# Patient Record
Sex: FEMALE | Race: BLACK OR AFRICAN AMERICAN | NOT HISPANIC OR LATINO | Employment: OTHER | URBAN - METROPOLITAN AREA
[De-identification: names, ages, dates, MRNs, and addresses within clinical notes are randomized per-mention and may not be internally consistent; named-entity substitution may affect disease eponyms.]

---

## 2017-01-03 ENCOUNTER — OFFICE VISIT (OUTPATIENT)
Dept: BEHAVIORAL HEALTH | Facility: PHYSICIAN GROUP | Age: 37
End: 2017-01-03
Payer: COMMERCIAL

## 2017-01-03 VITALS — RESPIRATION RATE: 14 BRPM | TEMPERATURE: 98.2 F | HEART RATE: 80 BPM

## 2017-01-03 DIAGNOSIS — F25.0 SCHIZOAFFECTIVE DISORDER, BIPOLAR TYPE (HCC): ICD-10-CM

## 2017-01-03 DIAGNOSIS — F51.01 PRIMARY INSOMNIA: ICD-10-CM

## 2017-01-03 PROCEDURE — 90833 PSYTX W PT W E/M 30 MIN: CPT | Performed by: PSYCHIATRY & NEUROLOGY

## 2017-01-03 PROCEDURE — 99213 OFFICE O/P EST LOW 20 MIN: CPT | Performed by: PSYCHIATRY & NEUROLOGY

## 2017-01-03 RX ORDER — LAMOTRIGINE 25 MG/1
TABLET ORAL
Qty: 60 TAB | Refills: 2 | Status: ON HOLD | OUTPATIENT
Start: 2017-01-03 | End: 2017-04-17

## 2017-01-03 RX ORDER — TRAZODONE HYDROCHLORIDE 50 MG/1
TABLET ORAL
Qty: 60 TAB | Refills: 5 | Status: ON HOLD | OUTPATIENT
Start: 2017-01-03 | End: 2017-04-17

## 2017-01-03 RX ORDER — ZIPRASIDONE HYDROCHLORIDE 80 MG/1
CAPSULE ORAL
Qty: 60 CAP | Refills: 5 | Status: SHIPPED | OUTPATIENT
Start: 2017-01-03 | End: 2018-03-27

## 2017-01-03 NOTE — PROGRESS NOTES
[facility]  PSYCHIATRIC FOLLOW-UP NOTE    Name: Cira Ferguson  MRN: [id number]  : 1980  Age: 36 y.o.  Date of assessment: 1/3/2017  PCP: Dee Sanchez M.D.  Persons in attendance: Patient    REASON FOR VISIT/CHIEF COMPLAINT (as stated by Patient):  Cira Ferguson is a 36 y.o.,  or  female, attending follow-up appointment for   Chief Complaint   Patient presents with   • Other     The patient is seen today for follow up on her psychosis and insomnia.   .      HISTORY OF PRESENT ILLNESS:    This is a 37 yo female, , disabled, lives with her , seen today for follow up. The patient has history of psychosis and haldol helped her. The patient has been on geodon for many years but reported insomnia.      PSYCHOSOCIAL CHANGES SINCE PREVIOUS CONTACT:  The patient reported severe mood swings and she has period of good days and bad days with irritability and depression.     RESPONSE TO TREATMENT:  Mood swings.    MEDICATION SIDE EFFECTS:  Denied.    REVIEW OF SYSTEMS:        Constitutional positive - chronic pain   Eyes negative   Ears/Nose/Mouth/Throat negative   Cardiovascular positive - DVT   Respiratory negative   Gastrointestinal negative   Genitourinary positive - PCOS   Muscular negative   Integumentary negative   Neurological negative   Endocrine negative   Hematologic/Lymphatic negative       PSYCHIATRIC EXAMINATION/MENTAL STATUS  Pulse 80  Temp(Src) 36.8 °C (98.2 °F)  Resp 14  LMP 2014  Participation: Active verbal participation, Attentive and Engaged  Grooming:Casual  Orientation: Alert and Fully Oriented  Eye contact: Good  Behavior:Calm   Mood: Anxious  Affect: Flexible and Full range  Thought process: Logical and Goal-directed  Thought content:  Within normal limits  Speech: Rate within normal limits and Volume within normal limits  Perception:  Within normal limits  Memory:  No gross evidence of memory deficits  Insight: Good  Judgment:  Good  Family/couple interaction observations:   Other:    Current risk:    Suicide: Low   Homicide: Low   Self-harm: Low  Relapse: Low  Other:   Crisis Safety Plan reviewed?Yes  If evidence of imminent risk is present, intervention/plan:    Medical Records/Labs/Diagnostic Tests Reviewed: yes.    Medical Records/Labs/Diagnostic Tests Ordered: none.    DIAGNOSTIC IMPRESSION(S):  1. Schizoaffective disorder, bipolar type (HCC)    2. Primary insomnia           ASSESSMENT AND PLAN:  Schizoaffective disorder  Insomnia  Mood swings.    Start Lamotrigine 25 mg for two weeks then two per day after discussing risk benefit and alternative.  Continue Geodon 80 mg two at night  Trazodone 50 mg two at night and prescription for geodn and trazodone send to pharmacy for six months and lamotrigine for two months  Follow up in six weeks.    More than 50% of face-to-face time in this 30 minute visit was spent in psychotherapy/counseling.    Topics addressed include:    We focused on her mood stability and coping skills. The patient is doing doet and exercise. She has a good sleep hygiene. The patient  is supportive.     Emery Michaels M.D.

## 2017-01-18 ENCOUNTER — TELEPHONE (OUTPATIENT)
Dept: BEHAVIORAL HEALTH | Facility: MEDICAL CENTER | Age: 37
End: 2017-01-18

## 2017-01-18 RX ORDER — ALPRAZOLAM 1 MG/1
1 TABLET ORAL NIGHTLY PRN
Qty: 15 TAB | Refills: 0 | Status: ON HOLD
Start: 2017-01-18 | End: 2017-04-17

## 2017-02-21 RX ORDER — ZIPRASIDONE HYDROCHLORIDE 80 MG/1
CAPSULE ORAL
Qty: 60 CAP | Refills: 0 | Status: ON HOLD | OUTPATIENT
Start: 2017-02-21 | End: 2017-04-17

## 2017-02-27 ENCOUNTER — OFFICE VISIT (OUTPATIENT)
Dept: BEHAVIORAL HEALTH | Facility: PHYSICIAN GROUP | Age: 37
End: 2017-02-27
Payer: COMMERCIAL

## 2017-02-27 VITALS
TEMPERATURE: 98.2 F | BODY MASS INDEX: 41.02 KG/M2 | RESPIRATION RATE: 16 BRPM | WEIGHT: 293 LBS | HEART RATE: 80 BPM | HEIGHT: 71 IN | DIASTOLIC BLOOD PRESSURE: 68 MMHG | SYSTOLIC BLOOD PRESSURE: 120 MMHG

## 2017-02-27 DIAGNOSIS — F25.0 SCHIZOAFFECTIVE DISORDER, BIPOLAR TYPE (HCC): ICD-10-CM

## 2017-02-27 PROCEDURE — 99213 OFFICE O/P EST LOW 20 MIN: CPT | Performed by: PSYCHIATRY & NEUROLOGY

## 2017-02-27 RX ORDER — OXCARBAZEPINE 300 MG/1
300 TABLET, FILM COATED ORAL
Qty: 30 TAB | Refills: 2 | Status: ON HOLD | OUTPATIENT
Start: 2017-02-27 | End: 2017-04-17

## 2017-02-27 NOTE — PROGRESS NOTES
"[facility]  PSYCHIATRIC FOLLOW-UP NOTE    Name: Cira Ferguson  MRN: [id number]  : 1980  Age: 36 y.o.  Date of assessment: 2017  PCP: Dee Sanchez M.D.  Persons in attendance: Patient and Spouse/Partner    REASON FOR VISIT/CHIEF COMPLAINT (as stated by Patient and Spouse/Partner):  Cira Ferguson is a 36 y.o.,  or  female, attending follow-up appointment for   Chief Complaint   Patient presents with   • Other     The patient is seen today for follow up on her bipolar disorder and she is not sleeping well.   .      HISTORY OF PRESENT ILLNESS:    This is a 37 yo female, , lives with her  seen today for follow up. The patient tried lamotrigine but she had rash from it. The patient reported that she has been irritated easely and she is taking it out on her . The patient has been sleeping well on geodon.      PSYCHOSOCIAL CHANGES SINCE PREVIOUS CONTACT:  Mood swings with irritation and impulsivity.    RESPONSE TO TREATMENT:  Fair.    MEDICATION SIDE EFFECTS:  Denied.    REVIEW OF SYSTEMS:        Constitutional positive - obese   Eyes negative   Ears/Nose/Mouth/Throat negative   Cardiovascular negative   Respiratory negative   Gastrointestinal negative   Genitourinary positive - PCOS   Muscular negative   Integumentary positive - chronic back pain   Neurological positive - none.   Endocrine negative   Hematologic/Lymphatic negative       PSYCHIATRIC EXAMINATION/MENTAL STATUS  /68 mmHg  Pulse 80  Temp(Src) 36.8 °C (98.2 °F)  Resp 16  Ht 1.803 m (5' 10.98\")  Wt 166.47 kg (367 lb)  BMI 51.21 kg/m2  LMP 2014  Participation: Active verbal participation, Attentive and Engaged  Grooming:Casual  Orientation: Alert and Fully Oriented  Eye contact: Good  Behavior:Calm   Mood: Anxious  Affect: Full range  Thought process: Logical and Goal-directed  Thought content:  Within normal limits  Speech: Rate within normal limits and Volume within normal " limits  Perception:  Within normal limits  Memory:  No gross evidence of memory deficits  Insight: Good  Judgment: Good  Family/couple interaction observations:   Other:    Current risk:    Suicide: Low   Homicide: Low   Self-harm: Low  Relapse: Low  Other:   Crisis Safety Plan reviewed?Yes  If evidence of imminent risk is present, intervention/plan:    Medical Records/Labs/Diagnostic Tests Reviewed: yes.    Medical Records/Labs/Diagnostic Tests Ordered: none.    DIAGNOSTIC IMPRESSION(S):  1. Schizoaffective disorder, bipolar type (CMS-HCC)           ASSESSMENT AND PLAN:  Schizoaffective disorder    Continue geodon 80 mg two at night  Start Oxcarbazepine 300 mg one at night after discussing risk, benefit, and alternative # 30 refills two.    More than 50% of face-to-face time in this follow up in two months. minute visit was spent in psychotherapy/counseling.    Topics addressed include:    Start mood chart  Good sleep hygiene  Improving interpersonal relationship.    Emery Michaels M.D.

## 2017-03-27 ENCOUNTER — OFFICE VISIT (OUTPATIENT)
Dept: BEHAVIORAL HEALTH | Facility: PHYSICIAN GROUP | Age: 37
End: 2017-03-27
Payer: COMMERCIAL

## 2017-03-27 VITALS
WEIGHT: 293 LBS | TEMPERATURE: 97.5 F | BODY MASS INDEX: 41.02 KG/M2 | SYSTOLIC BLOOD PRESSURE: 122 MMHG | RESPIRATION RATE: 14 BRPM | HEIGHT: 71 IN | DIASTOLIC BLOOD PRESSURE: 70 MMHG | HEART RATE: 82 BPM

## 2017-03-27 DIAGNOSIS — F25.0 SCHIZOAFFECTIVE DISORDER, BIPOLAR TYPE (HCC): ICD-10-CM

## 2017-03-27 PROCEDURE — 99213 OFFICE O/P EST LOW 20 MIN: CPT | Performed by: PSYCHIATRY & NEUROLOGY

## 2017-03-27 RX ORDER — RAMELTEON 8 MG/1
8 TABLET ORAL
Qty: 30 TAB | Refills: 1 | Status: ON HOLD | OUTPATIENT
Start: 2017-03-27 | End: 2017-04-17

## 2017-04-06 ENCOUNTER — TELEPHONE (OUTPATIENT)
Dept: MEDICAL GROUP | Facility: MEDICAL CENTER | Age: 37
End: 2017-04-06

## 2017-04-06 NOTE — TELEPHONE ENCOUNTER
Future Appointments       Provider Department Center    4/13/2017 8:00 AM BARBARA Carter [facility] [address]    4/24/2017 1:00 PM Emery Michaels M.D. [facility] [address] [place] [address]    5/15/2017 9:00 AM Emery Michaels M.D. BEHAVIORAL HEALTH [address] [facility]      Called Cira Ferguson in order to verify health topics prior to the Annual/N2U jona:      1)  All medications were updated? yes  2)  Allergies were updated? yes  3)  All care teams were updated? yes  4)  All pharmacies were updated? yes          Health Maintenance Due   Topic Date Due   • IMM DTaP/Tdap/Td Vaccine (1 - Tdap) 07/12/1998   • PAP SMEAR  07/12/2000              Current Outpatient Prescriptions   Medication Sig Dispense Refill   • ramelteon (ROZEREM) 8 MG tablet Take 1 Tab by mouth every bedtime. 30 Tab 1   • oxcarbazepine (TRILEPTAL) 300 MG Tab Take 1 Tab by mouth every bedtime. 30 Tab 2   • ziprasidone (GEODON) 80 MG Cap TAKE 1 CAPSULE BY MOUTH TWICE DAILY 60 Cap 0   • alprazolam (XANAX) 1 MG Tab Take 1 Tab by mouth at bedtime as needed for Sleep. 15 Tab 0   • ziprasidone (GEODON) 80 MG Cap Two tabs by mouth at night after meal. 60 Cap 5   • trazodone (DESYREL) 50 MG Tab One to two by mouth at night. 60 Tab 5   • lamotrigine (LAMICTAL) 25 MG Tab Take 1 tab by mouth daily for 2 weeks, then increased to 2 by mouth daily. 60 Tab 2   • trazodone (DESYREL) 50 MG Tab TAKE 1 TO 2 TABLETS BY MOUTH AT BEDTIME 60 Tab 0   • oxycodone, immediate release, (ROXICODONE) 5 MG TABS Take 10 mg by mouth every four hours as needed. 1-2 tablets by mouth every 4 hrs as needed for pain.  Indications: Moderate to Severe Pain       No current facility-administered medications for this visit.     5)  Web Iz Recommendations:         1) Tdap                 6)  RAMO letter will be faxed to:         1) [facility] records         7)  Previous PCP Med Rec will be obtained via:       1) Dr. Sanchez       8)  Notes to  provider or MA:       1) Establish care        2) Discuss Hep C       Pt was encouraged to keep the appointment and to arrive at least 30 minutes early. Patient is also aware that they must be on time or they would need to reschedule.    Provider name: HUA MICHEL

## 2017-04-06 NOTE — Clinical Note
[facility]  MARÍA CARTER  [address] [facility] [address]   [place] [address]  [facility] [phone]  FAX- [phone]   Authorization for Release/Disclosure of   Protected Health Information   Name: EBONY TORRES : 1980 SSN: [id number]   Address: [address] [place] [address] [address]  [place] [address] Phone:    [phone] (home)    I authorize the entity listed below to release/disclose the PHI below to:   [facility]/ BARBARA Carter   Provider or Entity Name: MARI JULES/ [facility]     Address   City, State, Zip   Phone:      Fax:[phone]     Reason for request: continuity of care   Information to be released:    [  ] LAST COLONOSCOPY,  including any PATH REPORT and follow-up  [  ] LAST FIT/COLOGUARD RESULT [  ] LAST DEXA  [  ] LAST MAMMOGRAM  [  ] LAST PAP  [  ] LAST LABS [  ] RETINA EXAM REPORT  [  ] IMMUNIZATION RECORDS  [X  ] Release all info      [  ] Check here and initial the line next to each item to release ALL health information INCLUDING  _____ Care and treatment for drug and / or alcohol abuse  _____ HIV testing, infection status, or AIDS  _____ Genetic Testing    DATES OF SERVICE OR TIME PERIOD TO BE DISCLOSED: _____________  I understand and acknowledge that:  * This Authorization may be revoked at any time by you in writing, except if your health information has already been used or disclosed.  * Your health information that will be used or disclosed as a result of you signing this authorization could be re-disclosed by the recipient. If this occurs, your re-disclosed health information may no longer be protected by State or Federal laws.  * You may refuse to sign this Authorization. Your refusal will not affect your ability to obtain treatment.  * This Authorization becomes effective upon signing and will  on (date) __________.      If no date is indicated, this Authorization will  one (1) year from the signature date.    Name: Ebony Boothe  Marty    Signature:   Date:     4/13/2017       PLEASE FAX REQUESTED RECORDS BACK TO: [phone]

## 2017-04-06 NOTE — Clinical Note
[facility]  MARÍA CARTER  [address] [facility] [address]   [place] [address]  [facility] [phone]  FAX- [phone]   Authorization for Release/Disclosure of   Protected Health Information   Name: EBONY FERGUSON : 1980 SSN: [id number]   Address: [address] [place] [address] [address]  [place] [address] Phone:    [phone] (home)    I authorize the entity listed below to release/disclose the PHI below to:   [facility]/ BARBARA Carter   Provider or Entity Name: [facility]      Address   [facility], [facility], [facility]   Phone:      Fax: [phone]     Reason for request: continuity of care   Information to be released:    [  ] LAST COLONOSCOPY,  including any PATH REPORT and follow-up  [  ] LAST FIT/COLOGUARD RESULT [  ] LAST DEXA  [  ] LAST MAMMOGRAM  [  ] LAST PAP  [  ] LAST LABS [  ] RETINA EXAM REPORT  [  ] IMMUNIZATION RECORDS  [ X ] Release all info      [  ] Check here and initial the line next to each item to release ALL health information INCLUDING  _____ Care and treatment for drug and / or alcohol abuse  _____ HIV testing, infection status, or AIDS  _____ Genetic Testing    DATES OF SERVICE OR TIME PERIOD TO BE DISCLOSED: _____________  I understand and acknowledge that:  * This Authorization may be revoked at any time by you in writing, except if your health information has already been used or disclosed.  * Your health information that will be used or disclosed as a result of you signing this authorization could be re-disclosed by the recipient. If this occurs, your re-disclosed health information may no longer be protected by State or Federal laws.  * You may refuse to sign this Authorization. Your refusal will not affect your ability to obtain treatment.  * This Authorization becomes effective upon signing and will  on (date) __________.      If no date is indicated, this Authorization will  one (1) year from the signature date.    Name: Ebony Ferguson    Signature:   Date:        4/13/2017       PLEASE FAX REQUESTED RECORDS BACK TO: [phone]

## 2017-04-13 ENCOUNTER — APPOINTMENT (OUTPATIENT)
Dept: MEDICAL GROUP | Facility: MEDICAL CENTER | Age: 37
End: 2017-04-13
Payer: COMMERCIAL

## 2017-04-14 ENCOUNTER — HOSPITAL ENCOUNTER (EMERGENCY)
Facility: MEDICAL CENTER | Age: 37
End: 2017-04-14
Attending: EMERGENCY MEDICINE
Payer: COMMERCIAL

## 2017-04-14 ENCOUNTER — APPOINTMENT (OUTPATIENT)
Dept: RADIOLOGY | Facility: MEDICAL CENTER | Age: 37
End: 2017-04-14
Attending: EMERGENCY MEDICINE
Payer: COMMERCIAL

## 2017-04-14 VITALS
BODY MASS INDEX: 41.02 KG/M2 | TEMPERATURE: 98.4 F | HEIGHT: 71 IN | HEART RATE: 62 BPM | OXYGEN SATURATION: 94 % | RESPIRATION RATE: 16 BRPM | SYSTOLIC BLOOD PRESSURE: 133 MMHG | WEIGHT: 293 LBS | DIASTOLIC BLOOD PRESSURE: 79 MMHG

## 2017-04-14 DIAGNOSIS — M54.5 BILATERAL LOW BACK PAIN, UNSPECIFIED CHRONICITY, WITH SCIATICA PRESENCE UNSPECIFIED: ICD-10-CM

## 2017-04-14 LAB
APPEARANCE UR: CLEAR
BILIRUB UR QL STRIP.AUTO: NEGATIVE
COLOR UR: NORMAL
CULTURE IF INDICATED INDCX: NO UA CULTURE
GLUCOSE UR STRIP.AUTO-MCNC: NEGATIVE MG/DL
KETONES UR STRIP.AUTO-MCNC: NEGATIVE MG/DL
LEUKOCYTE ESTERASE UR QL STRIP.AUTO: NEGATIVE
MICRO URNS: NORMAL
NITRITE UR QL STRIP.AUTO: NEGATIVE
PH UR STRIP.AUTO: 5.5 [PH]
PROT UR QL STRIP: NEGATIVE MG/DL
RBC UR QL AUTO: NEGATIVE
SP GR UR STRIP.AUTO: 1.02

## 2017-04-14 PROCEDURE — A9579 GAD-BASE MR CONTRAST NOS,1ML: HCPCS | Performed by: EMERGENCY MEDICINE

## 2017-04-14 PROCEDURE — 99284 EMERGENCY DEPT VISIT MOD MDM: CPT

## 2017-04-14 PROCEDURE — 72158 MRI LUMBAR SPINE W/O & W/DYE: CPT

## 2017-04-14 PROCEDURE — 96376 TX/PRO/DX INJ SAME DRUG ADON: CPT

## 2017-04-14 PROCEDURE — 700111 HCHG RX REV CODE 636 W/ 250 OVERRIDE (IP): Performed by: EMERGENCY MEDICINE

## 2017-04-14 PROCEDURE — 72131 CT LUMBAR SPINE W/O DYE: CPT

## 2017-04-14 PROCEDURE — 96374 THER/PROPH/DIAG INJ IV PUSH: CPT

## 2017-04-14 PROCEDURE — 81003 URINALYSIS AUTO W/O SCOPE: CPT

## 2017-04-14 PROCEDURE — 700117 HCHG RX CONTRAST REV CODE 255: Performed by: EMERGENCY MEDICINE

## 2017-04-14 PROCEDURE — 96375 TX/PRO/DX INJ NEW DRUG ADDON: CPT

## 2017-04-14 RX ORDER — ONDANSETRON 2 MG/ML
4 INJECTION INTRAMUSCULAR; INTRAVENOUS ONCE
Status: COMPLETED | OUTPATIENT
Start: 2017-04-14 | End: 2017-04-14

## 2017-04-14 RX ORDER — MORPHINE SULFATE 4 MG/ML
4 INJECTION, SOLUTION INTRAMUSCULAR; INTRAVENOUS ONCE
Status: COMPLETED | OUTPATIENT
Start: 2017-04-14 | End: 2017-04-14

## 2017-04-14 RX ADMIN — MORPHINE SULFATE 4 MG: 4 INJECTION INTRAVENOUS at 18:33

## 2017-04-14 RX ADMIN — MORPHINE SULFATE 4 MG: 4 INJECTION INTRAVENOUS at 16:44

## 2017-04-14 RX ADMIN — GADODIAMIDE 20 ML: 287 INJECTION INTRAVENOUS at 18:25

## 2017-04-14 RX ADMIN — ONDANSETRON 4 MG: 2 INJECTION INTRAMUSCULAR; INTRAVENOUS at 20:47

## 2017-04-14 RX ADMIN — ONDANSETRON 4 MG: 2 INJECTION INTRAMUSCULAR; INTRAVENOUS at 16:43

## 2017-04-14 RX ADMIN — MORPHINE SULFATE 4 MG: 4 INJECTION INTRAVENOUS at 20:47

## 2017-04-14 ASSESSMENT — ENCOUNTER SYMPTOMS
SHORTNESS OF BREATH: 0
NECK PAIN: 0
FEVER: 0
SENSORY CHANGE: 1
FALLS: 1
HEADACHES: 1
BACK PAIN: 1
ABDOMINAL PAIN: 0
DIARRHEA: 0
LOSS OF CONSCIOUSNESS: 0
CHILLS: 0
VOMITING: 0
NAUSEA: 0

## 2017-04-14 ASSESSMENT — PAIN SCALES - GENERAL
PAINLEVEL_OUTOF10: 2
PAINLEVEL_OUTOF10: 5
PAINLEVEL_OUTOF10: 2
PAINLEVEL_OUTOF10: 4

## 2017-04-14 NOTE — ED AVS SNAPSHOT
4/14/2017    Cira Ferguson  [address]  [place] [address]    Dear Cira:    [facility] wants to ensure your discharge home is safe and you or your loved ones have had all of your questions answered regarding your care after you leave the hospital.    Below is a list of resources and contact information should you have any questions regarding your hospital stay, follow-up instructions, or active medical symptoms.    Questions or Concerns Regarding… Contact   Medical Questions Related to Your Discharge  (7 days a week, 8am-5pm) Contact a Nurse Care Coordinator   [phone]   Medical Questions Not Related to Your Discharge  (24 hours a day / 7 days a week)  Contact the Nurse Health Line   [phone]    Medications or Discharge Instructions Refer to your discharge packet   or contact your [facility] Primary Care Provider   [phone]   Follow-up Appointment(s) Schedule your appointment via [employer]   or contact Scheduling [phone]   Billing Review your statement via [employer]  or contact Billing [phone]   Medical Records Review your records via [employer]   or contact Medical Records [phone]     You may receive a telephone call within two days of discharge. This call is to make certain you understand your discharge instructions and have the opportunity to have any questions answered. You can also easily access your medical information, test results and upcoming appointments via the [employer] free online health management tool. You can learn more and sign up at [employer]/T[employer]out. For assistance setting up your [employer]out account, please call [phone].    Once again, we want to ensure your discharge home is safe and that you have a clear understanding of any next steps in your care. If you have any questions or concerns, please do not hesitate to contact us, we are here for you. Thank you for choosing [facility] for your healthcare needs.    Sincerely,    Your [facility] Healthcare Team

## 2017-04-14 NOTE — ED NOTES
"Chief Complaint   Patient presents with   • Sent by MD     pt sent by private car from [facility], after GLF, pt states that she has hardware in her alesha from a previous surgery heard a \"pop\" now experiencing incontinence of bladder and right leg numbness and severe 10/10 pain.     Charge RN notified of pt arrival. Charge RN notified. Pt ambulatory to senior lounge.   Blood pressure 133/79, pulse 81, temperature 36.9 °C (98.4 °F), resp. rate 16, height 1.803 m (5' 11\"), weight 157.6 kg (347 lb 7.1 oz), last menstrual period 09/18/2014, SpO2 98 %.  Pt informed of wait times. Educated on triage process.  Asked to return to triage RN for any new or worsening of symptoms. Thanked for patience.        "

## 2017-04-14 NOTE — ED AVS SNAPSHOT
Sky Frequency Access Code: Activation code not generated  Current Sky Frequency Status: Active    [employer]  A secure, online tool to manage your health information     [employer]’s Sky Frequency® is a secure, online tool that connects you to your personalized health information from the privacy of your home -- day or night - making it very easy for you to manage your healthcare. Once the activation process is completed, you can even access your medical information using the Sky Frequency tashi, which is available for free in the Apple Tashi store or Google Play store.     Sky Frequency provides the following levels of access (as shown below):   My Chart Features   [facility] Primary Care Doctor [facility]  Specialists [facility]  Urgent  Care Non-[facility]  Primary Care  Doctor   Email your healthcare team securely and privately 24/7 X X X X   Manage appointments: schedule your next appointment; view details of past/upcoming appointments X      Request prescription refills. X      View recent personal medical records, including lab and immunizations X X X X   View health record, including health history, allergies, medications X X X X   Read reports about your outpatient visits, procedures, consult and ER notes X X X X   See your discharge summary, which is a recap of your hospital and/or ER visit that includes your diagnosis, lab results, and care plan. X X       How to register for Sky Frequency:  1. Go to  [email or web address].  2. Click on the Sign Up Now box, which takes you to the New Member Sign Up page. You will need to provide the following information:  a. Enter your Sky Frequency Access Code exactly as it appears at the top of this page. (You will not need to use this code after you’ve completed the sign-up process. If you do not sign up before the expiration date, you must request a new code.)   b. Enter your date of birth.   c. Enter your home email address.   d. Click Submit, and follow the next screen’s instructions.  3. Create a Sky Frequency ID. This will  be your [employer] login ID and cannot be changed, so think of one that is secure and easy to remember.  4. Create a [employer] password. You can change your password at any time.  5. Enter your Password Reset Question and Answer. This can be used at a later time if you forget your password.   6. Enter your e-mail address. This allows you to receive e-mail notifications when new information is available in [employer].  7. Click Sign Up. You can now view your health information.    For assistance activating your [employer] account, call [phone]

## 2017-04-14 NOTE — ED PROVIDER NOTES
"ED Provider Note    Scribed for Errol Buck D.O. by Naif Dong. 4/14/2017  4:11 PM    Primary care provider: Dee Sanchez M.D.  Means of arrival: Walk in  History obtained from: Patient  History limited by: none    CHIEF COMPLAINT  Chief Complaint   Patient presents with   • Sent by MD     pt sent by private car from [facility], after GLF, pt states that she has hardware in her alesha from a previous surgery heard a \"pop\" now experiencing incontinence of bladder and right leg numbness and severe 10/10 pain.     HPI  Cira Ferguson is a 36 y.o. female who presents to the Emergency Department complaining of mechanical ground level fall, onset today. The patient reports that she was in the shower when she fell over and then heard a large \"pop.\"  She states that she, since that time, has developed incontinence to bowel/bladder, that she reports having in the past.  Patient has had low back surgery, to her back and currently has \"metal\" in her back. Per patient,  She has had paresthesias to the vaginal/rectal area since her previous surgery, and this is not new.  It is, however, worsening over time.  The pt states \"I have not been able to feel sexual activity since my last surgery in 2014.\"  She is a patient at [facility]. The patient took 10 mg of Oxycodone without relief of her symptoms.  She did not strike  Her head, and has no head/neck/chest/ or abdominal pain.  Negative nausea, vomiting, diarrhea, chills, chest pain, or shortness of breath.     REVIEW OF SYSTEMS  Review of Systems   Constitutional: Negative for fever and chills.   Respiratory: Negative for shortness of breath.    Cardiovascular: Negative for chest pain.   Gastrointestinal: Negative for nausea, vomiting, abdominal pain and diarrhea.   Genitourinary: Negative for dysuria and urgency.   Musculoskeletal: Positive for back pain and falls. Negative for neck pain.   Neurological: Positive for sensory change (to rectum and vagina) and " headaches. Negative for loss of consciousness (no head trauma).        Positive incontinence of stool and urine.    All other systems reviewed and are negative.    PAST MEDICAL HISTORY   has a past medical history of Infectious disease; Fall; DVT (deep venous thrombosis) (CMS-HCC); Cervical cancer (CMS-HCC); Hepatitis C; Psychiatric disorder; Bipolar disorder (CMS-HCC); and Schizophrenia (CMS-HCC).    SURGICAL HISTORY   has past surgical history that includes lumbar laminectomy diskectomy (5/17/2011); gyn surgery; primary c section; lumbar laminectomy diskectomy (11/10/2011); lumbar fusion posterior (11/5/2012); lumbar decompression (11/5/2012); vaginal hysterectomy scope total (10/23/2014); salpingectomy (10/23/2014); anterior and posterior repair (10/23/2014); enterocele repair (10/23/2014); vaginal suspension (10/23/2014); bladder sling female (10/23/2014); and cystoscopy (10/23/2014).    SOCIAL HISTORY  Social History   Substance Use Topics   • Smoking status: Current Every Day Smoker -- 0.50 packs/day for 2 years     Types: Cigarettes   • Smokeless tobacco: Never Used   • Alcohol Use: No      History   Drug Use No     FAMILY HISTORY  Family History   Problem Relation Age of Onset   • Heart Disease Mother    • Diabetes Mother    • Lung Disease Mother    • Alcohol/Drug Father      CURRENT MEDICATIONS  No current facility-administered medications on file prior to encounter.     Current Outpatient Prescriptions on File Prior to Encounter   Medication Sig Dispense Refill   • ramelteon (ROZEREM) 8 MG tablet Take 1 Tab by mouth every bedtime. 30 Tab 1   • oxcarbazepine (TRILEPTAL) 300 MG Tab Take 1 Tab by mouth every bedtime. 30 Tab 2   • ziprasidone (GEODON) 80 MG Cap TAKE 1 CAPSULE BY MOUTH TWICE DAILY 60 Cap 0   • alprazolam (XANAX) 1 MG Tab Take 1 Tab by mouth at bedtime as needed for Sleep. 15 Tab 0   • ziprasidone (GEODON) 80 MG Cap Two tabs by mouth at night after meal. 60 Cap 5   • trazodone (DESYREL) 50 MG  "Tab One to two by mouth at night. 60 Tab 5   • lamotrigine (LAMICTAL) 25 MG Tab Take 1 tab by mouth daily for 2 weeks, then increased to 2 by mouth daily. 60 Tab 2   • trazodone (DESYREL) 50 MG Tab TAKE 1 TO 2 TABLETS BY MOUTH AT BEDTIME 60 Tab 0   • oxycodone, immediate release, (ROXICODONE) 5 MG TABS Take 10 mg by mouth every four hours as needed. 1-2 tablets by mouth every 4 hrs as needed for pain.  Indications: Moderate to Severe Pain       ALLERGIES  Allergies   Allergen Reactions   • Contrast Media With Iodine [Iodine]    • Dilaudid [Hydromorphone Hcl] Hives     PHYSICAL EXAM  VITAL SIGNS: /79 mmHg  Pulse 83  Temp(Src) 36.9 °C (98.4 °F)  Resp 16  Ht 1.803 m (5' 11\")  Wt 157.6 kg (347 lb 7.1 oz)  BMI 48.48 kg/m2  SpO2 92%  LMP 09/18/2014    Constitutional: Well developed, well nourished. No acute distress.  HEENT: Normocephalic, atraumatic. Posterior pharynx clear and moist.  Eyes:  EOMI. Normal sclera.  Neck: Supple, Full range of motion, nontender.  Chest/Pulmonary: clear to ausculation. Symmetrical expansion.   Cardio: Regular rate and rhythm with no murmur.   Abdomen: Soft, nontender. No peritoneal signs. No guarding. No palpable masses.  Back: No CVA tenderness, nontender midline, no step offs, tenderness to the L4/L5.   Musculoskeletal: No deformity, no edema, neurovascular intact.   Rectal: good tone, diminished sensation.   Neuro: Clear speech, appropriate, cooperative, cranial nerves II-XII grossly intact. 2/5 strength on the right 4/5 on the left, decreased sensation.   Psych: Normal mood and affect     DIAGNOSTIC STUDIES / PROCEDURES    LABS  Results for orders placed or performed during the hospital encounter of 04/14/17   URINALYSIS,CULTURE IF INDICATED   Result Value Ref Range    Color Lt. Yellow     Character Clear     Specific Gravity 1.017 <1.035    Ph 5.5 5.0-8.0    Glucose Negative Negative mg/dL    Ketones Negative Negative mg/dL    Protein Negative Negative mg/dL    Bilirubin " Negative Negative    Nitrite Negative Negative    Leukocyte Esterase Negative Negative    Occult Blood Negative Negative    Micro Urine Req see below     Culture Indicated No UA Culture       All labs reviewed by me.    RADIOLOGY  CT-LSPINE W/O PLUS RECONS   Final Result      1.  Degenerative and postoperative changes of the lumbar spine as described.   2.  No acute fracture or subluxation.   3.  No gross change from prior exam.      MR-LUMBAR SPINE-WITH & W/O    (Results Pending)     The radiologist's interpretation of all radiological studies have been reviewed by me.    COURSE & MEDICAL DECISION MAKING  Pertinent Labs & Imaging studies reviewed. (See chart for details)    4:11 PM - Patient seen and examined at bedside. Patient will be treated with pain medication. Ordered an MRI to evaluate her symptoms. The differential diagnoses include but are not limited to: cauda equina, fracture, UTI.    700PM;  I spoke to Dr. Rodriguez, who states disc bulging, but no cord issues.     7:36 PM  I spoke to Dr. Michaels.  He reviewed the MRI, and states that there is no explanation from a neurosurgery standpoint, that would be causing her incontinence.   He states the hardware is in place, but to be sure, we should obtain a CT of the lumbar spine.  One has been ordered.     8:03 PM  On re evaluation, and further questioning, it is noted that the pt has has a history of incontinence to bowel/bladder in the past.  She also has continuous rectal and vaginal paresthesias for 'years' and this is not new.  She reports incontinence, however she has not been incontinent here, and has been up to use the bathroom twice without incontinence.  She states 'I really thought I needed an MRI, and i'm glad I finally got one.'  On exam, she has good rectal tone, and can feel the sensation of the pinch at the anus, but not the 'pain.'  It is good that she does have some feeling regarding the exam.  I spoke to neurosurgery, who states there is  nothing for them to do at this time.  She requested a new neurosurgeon to follow up with, instead of Dr. French and Dr. Garcia.  I gave her the one on call, Dr. Michaels.  At this time, and after the CT scan, she would like to go home.  She states she prefers to be at home with her , because he knows where to 'rub her back.'  She did request some different pain medications, and I will give her some of these for the weekend.  She agrees to return here for any further issues, and will follow up as directed.     8:13 PM  Narcotic website has score of over 500.  No narcotic pain medications will be written for.     Aamir LOGAN chaperoned the rectal exam.     FINAL IMPRESSION  1. Bilateral low back pain, unspecified chronicity, with sciatica presence unspecified          Naif MAGALLON (Scribe), am scribing for, and in the presence of, Errol Buck D.O..    Electronically signed by: Naif Dong (Faisal), 4/14/2017    IErrol D.O. personally performed the services described in this documentation, as scribed by Naif Dong in my presence, and it is both accurate and complete.    The note accurately reflects work and decisions made by me.  Errol Buck  4/14/2017  8:08 PM

## 2017-04-14 NOTE — ED AVS SNAPSHOT
Home Care Instructions                                                                                                                Cira Ferguson   MRN: [id number]    Department:  [facility], Emergency Dept   Date of Visit:  4/14/2017            [facility], Emergency Dept    [address]    [facility] [address]    Phone:  [phone]      You were seen by     Errol Buck D.O.      Your Diagnosis Was     Bilateral low back pain, unspecified chronicity, with sciatica presence unspecified     M54.5       These are the medications you received during your hospitalization from 04/14/2017 1339 to 04/14/2017 2104     Date/Time Order Dose Route Action    04/14/2017 1644 morphine (pf) 4 mg/ml injection 4 mg 4 mg Intravenous Given    04/14/2017 1643 ondansetron (ZOFRAN) syringe/vial injection 4 mg 4 mg Intravenous Given    04/14/2017 1825 gadodiamide (OMNISCAN) SOLN 20 mL 20 mL Intravenous Given    04/14/2017 1833 morphine (pf) 4 mg/ml injection 4 mg 4 mg Intravenous Given    04/14/2017 2047 morphine (pf) 4 mg/ml injection 4 mg 4 mg Intravenous Given    04/14/2017 2047 ondansetron (ZOFRAN) syringe/vial injection 4 mg 4 mg Intravenous Given      Follow-up Information     1. Follow up with Dayo Mathew M.D..    Specialty:  Neurosurgery    Contact information    [address] [facility]  [address]  [place] [address] [phone]        Medication Information     Review all of your home medications and newly ordered medications with your primary doctor and/or pharmacist as soon as possible. Follow medication instructions as directed by your doctor and/or pharmacist.     Please keep your complete medication list with you and share with your physician. Update the information when medications are discontinued, doses are changed, or new medications (including over-the-counter products) are added; and carry medication information at all times in the event of emergency  situations.               Medication List      ASK your doctor about these medications        Instructions    Morning Afternoon Evening Bedtime    alprazolam 1 MG Tabs   Commonly known as:  XANAX        Take 1 Tab by mouth at bedtime as needed for Sleep.   Dose:  1 mg                        lamotrigine 25 MG Tabs   Commonly known as:  LAMICTAL        Take 1 tab by mouth daily for 2 weeks, then increased to 2 by mouth daily.                        oxcarbazepine 300 MG Tabs   Commonly known as:  TRILEPTAL        Take 1 Tab by mouth every bedtime.   Dose:  300 mg                        oxycodone immediate-release 5 MG Tabs   Commonly known as:  ROXICODONE        Take 10 mg by mouth every four hours as needed. 1-2 tablets by mouth every 4 hrs as needed for pain.  Indications: Moderate to Severe Pain   Dose:  10 mg                        ramelteon 8 MG tablet   Commonly known as:  ROZEREM        Take 1 Tab by mouth every bedtime.   Dose:  8 mg                        * trazodone 50 MG Tabs   Commonly known as:  DESYREL        TAKE 1 TO 2 TABLETS BY MOUTH AT BEDTIME                        * trazodone 50 MG Tabs   Commonly known as:  DESYREL        One to two by mouth at night.                        * ziprasidone 80 MG Caps   Commonly known as:  GEODON        Two tabs by mouth at night after meal.                        * ziprasidone 80 MG Caps   Commonly known as:  GEODON        TAKE 1 CAPSULE BY MOUTH TWICE DAILY                        * Notice:  This list has 4 medication(s) that are the same as other medications prescribed for you. Read the directions carefully, and ask your doctor or other care provider to review them with you.            Procedures and tests performed during your visit     CT-LSPINE W/O PLUS RECONS    MR-LUMBAR SPINE-WITH & W/O    NURSING COMMUNICATION    URINALYSIS,CULTURE IF INDICATED        Discharge Instructions       Back Pain, Adult  Back pain is very common in adults. The cause of back pain is  rarely dangerous and the pain often gets better over time. The cause of your back pain may not be known. Some common causes of back pain include:  · Strain of the muscles or ligaments supporting the spine.  · Wear and tear (degeneration) of the spinal disks.  · Arthritis.  · Direct injury to the back.  For many people, back pain may return. Since back pain is rarely dangerous, most people can learn to manage this condition on their own.  HOME CARE INSTRUCTIONS  Watch your back pain for any changes. The following actions may help to lessen any discomfort you are feeling:  · Remain active. It is stressful on your back to sit or  one place for long periods of time. Do not sit, drive, or  one place for more than 30 minutes at a time. Take short walks on even surfaces as soon as you are able. Try to increase the length of time you walk each day.  · Exercise regularly as directed by your health care provider. Exercise helps your back heal faster. It also helps avoid future injury by keeping your muscles strong and flexible.  · Do not stay in bed. Resting more than 1-2 days can delay your recovery.  · Pay attention to your body when you bend and lift. The most comfortable positions are those that put less stress on your recovering back. Always use proper lifting techniques, including:  ¨ Bending your knees.  ¨ Keeping the load close to your body.  ¨ Avoiding twisting.  · Find a comfortable position to sleep. Use a firm mattress and lie on your side with your knees slightly bent. If you lie on your back, put a pillow under your knees.  · Avoid feeling anxious or stressed. Stress increases muscle tension and can worsen back pain. It is important to recognize when you are anxious or stressed and learn ways to manage it, such as with exercise.  · Take medicines only as directed by your health care provider. Over-the-counter medicines to reduce pain and inflammation are often the most helpful. Your health care  provider may prescribe muscle relaxant drugs. These medicines help dull your pain so you can more quickly return to your normal activities and healthy exercise.  · Apply ice to the injured area:  ¨ Put ice in a plastic bag.  ¨ Place a towel between your skin and the bag.  ¨ Leave the ice on for 20 minutes, 2-3 times a day for the first 2-3 days. After that, ice and heat may be alternated to reduce pain and spasms.  · Maintain a healthy weight. Excess weight puts extra stress on your back and makes it difficult to maintain good posture.  SEEK MEDICAL CARE IF:  · You have pain that is not relieved with rest or medicine.  · You have increasing pain going down into the legs or buttocks.  · You have pain that does not improve in one week.  · You have night pain.  · You lose weight.  · You have a fever or chills.  SEEK IMMEDIATE MEDICAL CARE IF:   · You develop new bowel or bladder control problems.  · You have unusual weakness or numbness in your arms or legs.  · You develop nausea or vomiting.  · You develop abdominal pain.  · You feel faint.     This information is not intended to replace advice given to you by your health care provider. Make sure you discuss any questions you have with your health care provider.     Document Released: 12/18/2006 Document Revised: 01/08/2016 Document Reviewed: 04/21/2015  Elsevier Interactive Patient Education ©2016 [employer]            Patient Information     Patient Information    Following emergency treatment: all patient requiring follow-up care must return either to a private physician or a clinic if your condition worsens before you are able to obtain further medical attention, please return to the emergency room.     Billing Information    At [facility], we work to make the billing process streamlined for our patients.  Our Representatives are here to answer any questions you may have regarding your hospital bill.  If you have insurance coverage and have supplied your  insurance information to us, we will submit a claim to your insurer on your behalf.  Should you have any questions regarding your bill, we can be reached online or by phone as follows:  Online: You are able pay your bills online or live chat with our representatives about any billing questions you may have. We are here to help Monday - Friday from 8:00am to 7:30pm and 9:00am - 12:00pm on Saturdays.  Please visit [email or web address]  for more information.   Phone:  [phone] or [phone]    Please note that your emergency physician, surgeon, pathologist, radiologist, anesthesiologist, and other specialists are not employed by [facility] and will therefore bill separately for their services.  Please contact them directly for any questions concerning their bills at the numbers below:     Emergency Physician Services:  [phone]  [facility]:  [phone]  [facility]:  [phone]  [facility]:  [phone]    1. Your final bill may vary from the amount quoted upon discharge if all procedures are not complete at that time, or if your doctor has additional procedures of which we are not aware. You will receive an additional bill if you return to the Emergency Department at [facility] for suture removal regardless of the facility of which the sutures were placed.     2. Please arrange for settlement of this account at the emergency registration.    3. All self-pay accounts are due in full at the time of treatment.  If you are unable to meet this obligation then payment is expected within 4-5 days.     4. If you have had radiology studies (CT, X-ray, Ultrasound, MRI), you have received a preliminary result during your emergency department visit. Please contact the radiology department [phone] to receive a copy of your final result. Please discuss the Final result with your primary physician or with the follow up  physician provided.     Crisis Hotline:  [place] Crisis Hotline:  2-750-KJRYBDA or 1-740.806.5736  [place] Crisis Hotline:    1-831.286.4103 or 212-474-7750         ED Discharge Follow Up Questions    1. In order to provide you with very good care, we would like to follow up with a phone call in the next few days.  May we have your permission to contact you?     YES /  NO    2. What is the best phone number to call you? (       )_____-__________    3. What is the best time to call you?      Morning  /  Afternoon  /  Evening                   Patient Signature:  ____________________________________________________________    Date:  ____________________________________________________________      Your appointments     Apr 24, 2017  1:00 PM   Follow Up Med Management with Emery Michaels M.D.   [facility] [address] [place] [id number]   [phone]            May 15, 2017  9:00 AM   Follow Up Med Management with Emery Michaels M.D.   [facility] [address] [place] [id number]   [phone]

## 2017-04-15 NOTE — DISCHARGE INSTRUCTIONS
Back Pain, Adult  Back pain is very common in adults. The cause of back pain is rarely dangerous and the pain often gets better over time. The cause of your back pain may not be known. Some common causes of back pain include:  · Strain of the muscles or ligaments supporting the spine.  · Wear and tear (degeneration) of the spinal disks.  · Arthritis.  · Direct injury to the back.  For many people, back pain may return. Since back pain is rarely dangerous, most people can learn to manage this condition on their own.  HOME CARE INSTRUCTIONS  Watch your back pain for any changes. The following actions may help to lessen any discomfort you are feeling:  · Remain active. It is stressful on your back to sit or  one place for long periods of time. Do not sit, drive, or  one place for more than 30 minutes at a time. Take short walks on even surfaces as soon as you are able. Try to increase the length of time you walk each day.  · Exercise regularly as directed by your health care provider. Exercise helps your back heal faster. It also helps avoid future injury by keeping your muscles strong and flexible.  · Do not stay in bed. Resting more than 1-2 days can delay your recovery.  · Pay attention to your body when you bend and lift. The most comfortable positions are those that put less stress on your recovering back. Always use proper lifting techniques, including:  ¨ Bending your knees.  ¨ Keeping the load close to your body.  ¨ Avoiding twisting.  · Find a comfortable position to sleep. Use a firm mattress and lie on your side with your knees slightly bent. If you lie on your back, put a pillow under your knees.  · Avoid feeling anxious or stressed. Stress increases muscle tension and can worsen back pain. It is important to recognize when you are anxious or stressed and learn ways to manage it, such as with exercise.  · Take medicines only as directed by your health care provider. Over-the-counter  medicines to reduce pain and inflammation are often the most helpful. Your health care provider may prescribe muscle relaxant drugs. These medicines help dull your pain so you can more quickly return to your normal activities and healthy exercise.  · Apply ice to the injured area:  ¨ Put ice in a plastic bag.  ¨ Place a towel between your skin and the bag.  ¨ Leave the ice on for 20 minutes, 2-3 times a day for the first 2-3 days. After that, ice and heat may be alternated to reduce pain and spasms.  · Maintain a healthy weight. Excess weight puts extra stress on your back and makes it difficult to maintain good posture.  SEEK MEDICAL CARE IF:  · You have pain that is not relieved with rest or medicine.  · You have increasing pain going down into the legs or buttocks.  · You have pain that does not improve in one week.  · You have night pain.  · You lose weight.  · You have a fever or chills.  SEEK IMMEDIATE MEDICAL CARE IF:   · You develop new bowel or bladder control problems.  · You have unusual weakness or numbness in your arms or legs.  · You develop nausea or vomiting.  · You develop abdominal pain.  · You feel faint.     This information is not intended to replace advice given to you by your health care provider. Make sure you discuss any questions you have with your health care provider.     Document Released: 12/18/2006 Document Revised: 01/08/2016 Document Reviewed: 04/21/2015  [employer] Interactive Patient Education ©2016 [employer]

## 2017-04-15 NOTE — ED NOTES
Patient to follow up with PCP. Patient instructed not to drive under the influence of narcotics. Patient ambulatory upon discharge. Patient verbalizes understanding of discharge instructions.

## 2017-04-17 ENCOUNTER — APPOINTMENT (OUTPATIENT)
Dept: RADIOLOGY | Facility: MEDICAL CENTER | Age: 37
DRG: 696 | End: 2017-04-17
Attending: HOSPITALIST
Payer: COMMERCIAL

## 2017-04-17 ENCOUNTER — RESOLUTE PROFESSIONAL BILLING HOSPITAL PROF FEE (OUTPATIENT)
Dept: HOSPITALIST | Facility: MEDICAL CENTER | Age: 37
End: 2017-04-17
Payer: COMMERCIAL

## 2017-04-17 ENCOUNTER — HOSPITAL ENCOUNTER (INPATIENT)
Facility: MEDICAL CENTER | Age: 37
LOS: 1 days | DRG: 696 | End: 2017-04-17
Attending: HOSPITALIST | Admitting: HOSPITALIST
Payer: COMMERCIAL

## 2017-04-17 VITALS
TEMPERATURE: 97.2 F | BODY MASS INDEX: 41.02 KG/M2 | WEIGHT: 293 LBS | DIASTOLIC BLOOD PRESSURE: 88 MMHG | HEART RATE: 62 BPM | RESPIRATION RATE: 18 BRPM | HEIGHT: 71 IN | SYSTOLIC BLOOD PRESSURE: 149 MMHG | OXYGEN SATURATION: 97 %

## 2017-04-17 PROCEDURE — 93010 ELECTROCARDIOGRAM REPORT: CPT | Performed by: INTERNAL MEDICINE

## 2017-04-17 PROCEDURE — 71010 DX-CHEST-PORTABLE (1 VIEW): CPT

## 2017-04-17 PROCEDURE — 93005 ELECTROCARDIOGRAM TRACING: CPT | Performed by: HOSPITALIST

## 2017-04-17 PROCEDURE — 99356 PR PROLONGED SVC I/P OR OBS SETTING 1ST HOUR: CPT | Performed by: HOSPITALIST

## 2017-04-17 PROCEDURE — 99223 1ST HOSP IP/OBS HIGH 75: CPT | Performed by: HOSPITALIST

## 2017-04-17 PROCEDURE — 770001 HCHG ROOM/CARE - MED/SURG/GYN PRIV*

## 2017-04-17 RX ORDER — DEXAMETHASONE SODIUM PHOSPHATE 4 MG/ML
4 INJECTION, SOLUTION INTRA-ARTICULAR; INTRALESIONAL; INTRAMUSCULAR; INTRAVENOUS; SOFT TISSUE EVERY 6 HOURS
Status: DISCONTINUED | OUTPATIENT
Start: 2017-04-17 | End: 2017-04-18 | Stop reason: HOSPADM

## 2017-04-17 RX ORDER — MELATONIN 10 MG
CAPSULE ORAL
COMMUNITY
End: 2018-03-27

## 2017-04-17 ASSESSMENT — LIFESTYLE VARIABLES
ALCOHOL_USE: NO
EVER_SMOKED: YES

## 2017-04-17 ASSESSMENT — PAIN SCALES - GENERAL: PAINLEVEL_OUTOF10: 10

## 2017-04-17 NOTE — IP AVS SNAPSHOT
Brandfolder Access Code: Activation code not generated  Current Brandfolder Status: Active    Hint Inchart  A secure, online tool to manage your health information     "[employer]’s Brandfolder® is a secure, online tool that connects you to your personalized health information from the privacy of your home -- day or night - making it very easy for you to manage your healthcare. Once the activation process is completed, you can even access your medical information using the Brandfolder tashi, which is available for free in the Apple Tashi store or Google Play store.     Brandfolder provides the following levels of access (as shown below):   My Chart Features   [facility] Primary Care Doctor [facility]  Specialists [facility]  Urgent  Care Non-[facility]  Primary Care  Doctor   Email your healthcare team securely and privately 24/7 X X X X   Manage appointments: schedule your next appointment; view details of past/upcoming appointments X      Request prescription refills. X      View recent personal medical records, including lab and immunizations X X X X   View health record, including health history, allergies, medications X X X X   Read reports about your outpatient visits, procedures, consult and ER notes X X X X   See your discharge summary, which is a recap of your hospital and/or ER visit that includes your diagnosis, lab results, and care plan. X X       How to register for Brandfolder:  1. Go to  [email or web address].  2. Click on the Sign Up Now box, which takes you to the New Member Sign Up page. You will need to provide the following information:  a. Enter your Brandfolder Access Code exactly as it appears at the top of this page. (You will not need to use this code after you’ve completed the sign-up process. If you do not sign up before the expiration date, you must request a new code.)   b. Enter your date of birth.   c. Enter your home email address.   d. Click Submit, and follow the next screen’s instructions.  3. Create a Brandfolder ID. This will  be your Fiksu login ID and cannot be changed, so think of one that is secure and easy to remember.  4. Create a Fiksu password. You can change your password at any time.  5. Enter your Password Reset Question and Answer. This can be used at a later time if you forget your password.   6. Enter your e-mail address. This allows you to receive e-mail notifications when new information is available in Fiksu.  7. Click Sign Up. You can now view your health information.    For assistance activating your Fiksu account, call [phone]

## 2017-04-17 NOTE — IP AVS SNAPSHOT
" Home Care Instructions                                                                                                                  Name:Cira Ferguson  Medical Record Number:[id number]  CSN: [id number]    YOB: 1980   Age: 36 y.o.  Sex: female  HT:1.803 m (5' 11\") WT: 158.6 kg (349 lb 10.4 oz)          Admit Date: 4/17/2017     Discharge Date:   Today's Date: 4/17/2017  Attending Doctor:  Cliff Yen M.D.                  Allergies:  Contrast media with iodine and Dilaudid            Discharge Instructions       Discharge Instructions    Discharged to home by car with self. Discharged via wheelchair, hospital escort: Yes.  Special equipment needed: Not Applicable    Be sure to schedule a follow-up appointment with your primary care doctor or any specialists as instructed.     Discharge Plan:   Diet Plan: Discussed  Activity Level: Discussed  Smoking Cessation Offered: Patient Refused  Confirmed Follow up Appointment: Patient to Call and Schedule Appointment  Confirmed Symptoms Management: Discussed  Medication Reconciliation Updated: Yes  Influenza Vaccine Indication: Patient Refuses    I understand that a diet low in cholesterol, fat, and sodium is recommended for good health. Unless I have been given specific instructions below for another diet, I accept this instruction as my diet prescription.   Other diet: regular    Special Instructions: None    · Is patient discharged on Warfarin / Coumadin?   No     · Is patient Post Blood Transfusion?  No    Depression / Suicide Risk    As you are discharged from this [facility] facility, it is important to learn how to keep safe from harming yourself.    Recognize the warning signs:  · Abrupt changes in personality, positive or negative- including increase in energy   · Giving away possessions  · Change in eating patterns- significant weight changes-  positive or negative  · Change in sleeping patterns- unable to sleep or sleeping all the " time   · Unwillingness or inability to communicate  · Depression  · Unusual sadness, discouragement and loneliness  · Talk of wanting to die  · Neglect of personal appearance   · Rebelliousness- reckless behavior  · Withdrawal from people/activities they love  · Confusion- inability to concentrate     If you or a loved one observes any of these behaviors or has concerns about self-harm, here's what you can do:  · Talk about it- your feelings and reasons for harming yourself  · Remove any means that you might use to hurt yourself (examples: pills, rope, extension cords, firearm)  · Get professional help from the community (Mental Health, Substance Abuse, psychological counseling)  · Do not be alone:Call your Safe Contact- someone whom you trust who will be there for you.  · Call your local CRISIS HOTLINE 998-9371 or 742-846-6483  · Call your local Children's Mobile Crisis Response Team [place] (305) 327-8943 or www.[employer]  · Call the toll free National Suicide Prevention Hotlines   · National Suicide Prevention Lifeline 918-515-YHBU (5568)  · [employer] Hope Line Network 800-SUICIDE (739-0005)        Your appointments     Apr 24, 2017  1:00 PM   Follow Up Med Management with Emery Michaels M.D.   BEHAVIORAL HEALTH [address] [place] [address])    [address] [place] [address]   [phone]            May 15, 2017  9:00 AM   Follow Up Med Management with Emery Michaels M.D.   BEHAVIORAL HEALTH [address] [place] [address])    [address] [place] [address]   [phone]              Follow-up Information     1. Follow up with BARBARA Carter. Schedule an appointment as soon as possible for a visit in 1 day.    Specialty:  Family Medicine    Why:  As needed, If symptoms worsen    Contact information    [address]  [address]  [facility] [id number] [phone]           Discharge Medication Instructions:    Below are the medications your physician expects you to take upon  discharge:    Review all your home medications and newly ordered medications with your doctor and/or pharmacist. Follow medication instructions as directed by your doctor and/or pharmacist.    Please keep your medication list with you and share with your physician.               Medication List      CONTINUE taking these medications        Instructions    Morning Afternoon Evening Bedtime    Melatonin 10 MG Caps        Take  by mouth.                        oxycodone immediate-release 5 MG Tabs   Commonly known as:  ROXICODONE        Take 10 mg by mouth every four hours as needed. 1-2 tablets by mouth every 4 hrs as needed for pain.  Indications: Moderate to Severe Pain   Dose:  10 mg                        ziprasidone 80 MG Caps   Commonly known as:  GEODON        Two tabs by mouth at night after meal.                                Instructions           Diet / Nutrition:    Follow any diet instructions given to you by your doctor or the dietician, including how much salt (sodium) you are allowed each day.    If you are overweight, talk to your doctor about a weight reduction plan.    Activity:    Remain physically active following your doctor's instructions about exercise and activity.    Rest often.     Any time you become even a little tired or short of breath, SIT DOWN and rest.    Worsening Symptoms:    Report any of the following signs and symptoms to the doctor's office immediately:    *Pain of jaw, arm, or neck  *Chest pain not relieved by medication                               *Dizziness or loss of consciousness  *Difficulty breathing even when at rest   *More tired than usual                                       *Bleeding drainage or swelling of surgical site  *Swelling of feet, ankles, legs or stomach                 *Fever (>100ºF)  *Pink or blood tinged sputum  *Weight gain (3lbs/day or 5lbs /week)           *Shock from internal defibrillator (if applicable)  *Palpitations or irregular heartbeats                 *Cool and/or numb extremities    Stroke Awareness    Common Risk Factors for Stroke include:    Age  Atrial Fibrillation  Carotid Artery Stenosis  Diabetes Mellitus  Excessive alcohol consumption  High blood pressure  Overweight   Physical inactivity  Smoking    Warning signs and symptoms of a stroke include:    *Sudden numbness or weakness of the face, arm or leg (especially on one side of the body).  *Sudden confusion, trouble speaking or understanding.  *Sudden trouble seeing in one or both eyes.  *Sudden trouble walking, dizziness, loss of balance or coordination.Sudden severe headache with no known cause.    It is very important to get treatment quickly when a stroke occurs. If you experience any of the above warning signs, call 911 immediately.                   Disclaimer         Quit Smoking / Tobacco Use:    I understand the use of any tobacco products increases my chance of suffering from future heart disease or stroke and could cause other illnesses which may shorten my life. Quitting the use of tobacco products is the single most important thing I can do to improve my health. For further information on smoking / tobacco cessation call a Toll Free Quit Line at 1-433.785.3239 (*National Cancer [place]) or 1-159.164.4080 (American Lung Association) or you can access the web based program at www.lungBrightSky Labs.org.    [place] Tobacco Users Help Line:  (693) 534-8908       Toll Free: 1-970.894.2066  Quit Tobacco Program [facility] (226)113-1179    Crisis Hotline:    [place] Crisis Hotline:  5-943-AWTAUZP or 1-830.562.5705    [place] Crisis Hotline:    1-988.411.4109 or 729-314-7873    Discharge Survey:   Thank you for choosing [facility]. We hope we did everything we could to make your hospital stay a pleasant one. You may be receiving a phone survey and we would appreciate your time and participation in answering the questions. Your input is very valuable to us in our efforts  to improve our service to our patients and their families.        My signature on this form indicates that:    1. I have reviewed and understand the above information.  2. My questions regarding this information have been answered to my satisfaction.  3. I have formulated a plan with my discharge nurse to obtain my prescribed medications for home.                  Disclaimer         __________________________________                     __________       ________                       Patient Signature                                                 Date                    Time

## 2017-04-17 NOTE — IP AVS SNAPSHOT
4/17/2017    Cira Ferguson  [address]  [place] [address]    Dear Cira:    [facility] wants to ensure your discharge home is safe and you or your loved ones have had all of your questions answered regarding your care after you leave the hospital.    Below is a list of resources and contact information should you have any questions regarding your hospital stay, follow-up instructions, or active medical symptoms.    Questions or Concerns Regarding… Contact   Medical Questions Related to Your Discharge  (7 days a week, 8am-5pm) Contact a Nurse Care Coordinator   [phone]   Medical Questions Not Related to Your Discharge  (24 hours a day / 7 days a week)  Contact the Nurse Health Line   [phone]    Medications or Discharge Instructions Refer to your discharge packet   or contact your [facility] Primary Care Provider   [phone]   Follow-up Appointment(s) Schedule your appointment via Lono   or contact Scheduling [phone]   Billing Review your statement via Lono  or contact Billing [phone]   Medical Records Review your records via Lono   or contact Medical Records [phone]     You may receive a telephone call within two days of discharge. This call is to make certain you understand your discharge instructions and have the opportunity to have any questions answered. You can also easily access your medical information, test results and upcoming appointments via the Lono free online health management tool. You can learn more and sign up at [employer]/Lono. For assistance setting up your Lono account, please call [phone].    Once again, we want to ensure your discharge home is safe and that you have a clear understanding of any next steps in your care. If you have any questions or concerns, please do not hesitate to contact us, we are here for you. Thank you for choosing [facility] for your healthcare needs.    Sincerely,    Your [facility] Healthcare Team

## 2017-04-17 NOTE — PROGRESS NOTES
Direct admit from Dr. Flores at [facility]. Dr. Yen accepting for Spinal Cord compression. ADT signed and held 04/17/02017 at 1621 will need to be released upon patient arrival to unit. Patient arriving via POV.

## 2017-04-17 NOTE — PROGRESS NOTES
Direct admit from Dr. Flores at [facility]. Dr. Yen accepting for Spinal Cord compression. ADT signed and held 04/17/02017 at 1621 will need to be released upon patient arrival to unit. Patient arriving via ground transport.

## 2017-04-17 NOTE — IP AVS SNAPSHOT
" <p align=\"LEFT\"><IMG SRC=\"//[email or web address]/blob$/[email or web address]\" alt=\"Image\" WIDTH=\"50%\" HEIGHT=\"200\" BORDER=\"\"></p>                   Name:Cira Ferguson  Medical Record Number:[id number]  CSN: [id number]    YOB: 1980   Age: 36 y.o.  Sex: female  HT:1.803 m (5' 11\") WT: 158.6 kg (349 lb 10.4 oz)          Admit Date: 4/17/2017     Discharge Date:   Today's Date: 4/17/2017  Attending Doctor:  Cliff Yen M.D.                  Allergies:  Contrast media with iodine and Dilaudid          Your appointments     Apr 24, 2017  1:00 PM   Follow Up Med Management with Emery Michaels M.D.   BEHAVIORAL HEALTH [address]    [address] [place] [address]   [phone]            May 15, 2017  9:00 AM   Follow Up Med Management with Emery Michaels M.D.   [facility]IORAL [facility] [address]    [address] [place] [address]   [phone]              Follow-up Information     1. Follow up with BARBARA Carter. Schedule an appointment as soon as possible for a visit in 1 day.    Specialty:  Family Medicine    Why:  As needed, If symptoms worsen    Contact information    [address]  [address]  [facility] [id number] [phone]           Medication List      Take these Medications        Instructions    Melatonin 10 MG Caps    Take  by mouth.       oxycodone immediate-release 5 MG Tabs   Commonly known as:  ROXICODONE    Take 10 mg by mouth every four hours as needed. 1-2 tablets by mouth every 4 hrs as needed for pain.  Indications: Moderate to Severe Pain   Dose:  10 mg       ziprasidone 80 MG Caps   Commonly known as:  GEODON    Two tabs by mouth at night after meal.         "

## 2017-04-18 LAB — EKG IMPRESSION: NORMAL

## 2017-04-18 NOTE — PROGRESS NOTES
"Ciar Shyann Marty assessed after assuming care. no signs of acute distress other than stated pain and appears to be in stable condition. Last documented VS: /88 mmHg  Pulse 62  Temp(Src) 36.2 °C (97.2 °F)  Resp 18  Ht 1.803 m (5' 11\")  Wt 158.6 kg (349 lb 10.4 oz)  BMI 48.79 kg/m2  SpO2 97%  LMP 09/18/2014      Patient refused to allow new set of vitals    Mobility: Patient in bed at this time, refused to mobilize    Fall precautions in place. Hourly rounding in place and explained to Cira. Educated Cira on call light use as well as phone instructions to call RN or CNA directly. Possessions within patient's reach, fall precautions in place. Pressure ulcer prevention techniques in use, pressure points assessed and pressure relieved from vulnerable areas. Pillows uses copiously for support and positioning where applicable.    All prudent patient safety measures and applicable nursing interventions taken.    Cira educated on Pain, Position, Potty, Priorities and instructed to notify RN if anything additional can be done to make stay more comfortable including linen change and toiletries.     Care plan:     Problem: Safety   Goal: Will remain free from falls   Outcome: PROGRESSING as EXPECTED   Cira educated about fall risk. Will remain free from injury or falls. Appropriate side rails up. Bed in low position, call light and possions within reach, bed alarm in use. Hourly rounding in place.      *addendum to follow below at end or throughout shift if significant events occurred: if blank n/a    OVERNIGHT SIGNIFICANT EVENTS:  2125 patient agitated and verbally aggressive. States \"i already paid for the room for the night so I'm not leaving\"  2130 Baljinder SCOTT gave orders to discharge based on neurosurgery review of imaging.  2133 Patient yelling at visitor in room saying she wants to cut her legs off.    "

## 2017-04-18 NOTE — PROGRESS NOTES
Pt oriented to room, equipment, SCDs, activity orders, diet, smoking policy, fall precautions, skin care, 5 Ps and hourly rounding. Pt also oriented to current plan of care. Pt verbalized understanding with no additional questions at this time. Will continue to monitor.

## 2017-04-18 NOTE — PROGRESS NOTES
Neurosurgery notified of Pt's arrival. RN who returned call stated MD French and MD on call (Abran) both had no knowledge of this patient.    Pt states MD French has performed two of her back surgeries.    MD Yen notified.

## 2017-04-18 NOTE — PROGRESS NOTES
Neurosurgery  I have reviewed this patient's images.  Her hardware placement is unchanged from CT scan in 2013.  Her MRI does not show any cauda equina compression or severe foraminal stenosis at any level.  She does not need further neurosurgical intervention, care or management at this point.  She has not been in contact with my office (as far as I am aware)

## 2017-04-18 NOTE — PROGRESS NOTES
"2158 Patient refused to sign discharge paperwork stating \"i'm not going anywhere!\".  RN explained Neurosurgery had review her imaging and stated no surgical intervention necessary at this time (see not from Dr. Foy). RN informed patient of discharge orders. Patient states willingness to \"leave voluntarily\" if MD can get her appointment in am. Spoke with physician about whether or not he would come speak with her one last time or else needing security to escort patient out. Patient immediately, while on phone with Baljinder SCOTT, stood up and began to exit the hospital. Patient being discharged with  due to hostile behavior. Patient visualized walking briskly with steady gait and normal stride.Patient states (shouting) \"I'll be in contact with a  in the morning!\"    All staff were polite and maintained professionalism and courteous behaviors throughout discharge.      "

## 2017-04-18 NOTE — H&P
"This document is to serve as history and physical and discharge summary.    CHIEF COMPLAINT:  \"I am urinating on myself.\"    HISTORY OF PRESENTING ILLNESS:  This is a 36-year-old female initially   presented to our emergency room as a transfer from [facility] on Friday,   the 14th.  She was evaluated at that time by Dr. Mathew who reviewed her   images and felt not to be a surgical candidate as it was not a neurosurgical   cause of her complaints.  She was discharged home and presented today at the   emergency room at [facility].  I discussed the case with Dr. Flores at   [facility] emergency room.  The patient told him that the neurosurgeon   had called her and told her to come to the emergency room to be admitted to   the hospital for surgical intervention on her back.  Unfortunately, it seems   that at the time the patient was, for some reason, under the impression that   she was going to have surgery; however, in reviewing the case with doctors,   Dr. French and Abran who are on call for that group, neither of them nor any   other office staff called the patient.  I also discussed the case with Dr. Mathew who reviewed her images on Friday.  He also did not call the patient   and no one at his office called the patient either.  Nonetheless, given the   patient's urinary symptoms, the patient had already been transferred to our   hospital as a direct admission for presumed surgical management of presumed   cauda equina syndrome.  After the patient arrived at our facility I was able   to review her case.  Her MR imaging demonstrated stable prior surgical input   as well as surgical hardware with no gross change from her prior exam 3 years   ago.  I consulted Dr. Mathew who graciously agreed to see the patient in   consultation and reviewed the images, again confirming that there was no cauda   equina compression or stenosis and in my personal discussion with the   patient, her symptoms are " essentially unchanged.  She is complaining of severe   pain, it is rendering her unable to do her job; however, I would note that   she transferred over here from [facility] emergency room on her own in a   private vehicle and walked into the hospital.  Consequently with no surgical   intervention being planned or necessary and no acute changes whatsoever, the   patient was able to be discharged from the hospital and though at the time of   the placement of inpatient admission orders, I was under the impression that   she was to get surgical intervention, she should not have been admitted to the   hospital at any time for any surgical intervention as none was planned   despite what the patient told Dr. Flores at the outside facility.    REVIEW OF SYSTEMS:  As above.  The remainder is negative in all systems except   as noted.    PAST MEDICAL HISTORY:  Bipolar disorder, hepatitis C, schizophrenia, lumbar   radiculopathy, status post surgical repair in 2013.    SOCIAL HISTORY:  She denies currently smoking.    FAMILY HISTORY:  Heart disease, lung disease, alcohol abuse and diabetes.    HOME MEDICATIONS:  Oxycodone, Geodon, melatonin.    DISCHARGE MEDICATIONS:  Geodon 80 mg nightly, oxycodone 10 mg every 4 hours as   needed for pain, melatonin 10 mg at bedtime.    ALLERGIES:  CONTRAST DYE AND DILAUDID.    CONSULTATIONS:  Dr. Dayo Mathew, neurosurgery.    PROCEDURES:  None.    DIET:  As tolerated.    ACTIVITY:  As tolerated.    FOLLOWUP:  She is to follow up with her primary care and Dr. Mathew or Dr. French as needed.    PHYSICAL EXAMINATION:  VITAL SIGNS:  Temperature 36.2, heart rate 62, respiratory rate 18, blood   pressure 149/88, oxygenation 97% on room air, weight is 159 kg.  GENERAL:  This is an adult, morbidly obese female, who appears unhappy, but   comfortable in bed, in no apparent hemodynamic distress.  HEENT:  Pupils are equal, round and reactive to light.  There is no scleral   pallor or  icterus.  Extraocular muscles are intact.  Mucous membranes are   moist.  There is no cervical or supraclavicular lymphadenopathy.  NECK:  Supple without thyromegaly.  CHEST:  Lungs are clear to auscultation throughout without any crackles or   wheezes.  There is good air entry and good respiratory effort.  CARDIOVASCULAR:  She has regular rate and rhythm without any murmurs, rubs or   gallops.  ABDOMEN:  Obese, but soft, nondistended with positive bowel sounds and no   appreciable hepatosplenomegaly or mass.  EXTREMITIES:  2+ pulses.  She is moving all extremities spontaneously.  She   refuses the remainder of an extremity exam.  NEUROLOGICAL:  She is alert and oriented x4, but refuses remainder of the   neurological exam.    LABORATORY DATA:  She refused a draw on this admission.    RADIOLOGY:  Chest x-ray was performed and demonstrates no acute   cardiopulmonary processes.  Electrocardiography, I reviewed her EKG, which   shows sinus bradycardia with ventricular rate of 56, but no acute ST-T segment   changes and good progression of the R waves in anterolateral leads.    MEDICAL DECISION MAKING:  This is a 36-year-old female who comes in, who at   the time of admission, I was under the impression she would require at least 2   midnights in medical care for surgical management of cauda equina syndrome.    However, during the further course of investigation of her admitting complaint   discovered that she did not require admission to the hospital whatsoever and   could be safely discharged for outpatient followup with neurosurgery.    ASSESSMENT AND PLAN:  Urinary incontinence.  She certainly needs outpatient   followup for this, possibly with urology or primary care.  She says that she   has not established primary care and does not want me to arrange one for her.    RECOMMENDATIONS:  I would recommend that she follow up with primary care and   consider referral to urology.  As noted above, Dr. Mathew has  reviewed her   images.  Her images demonstrate stable hardware from her prior surgical   intervention in 2013 and did not require further neurosurgical intervention at   this time.  The patient therefore can be safely discharged to home with   outpatient followup.    Total time of admission, investigation, evaluation, and discharge exceeds 120   minutes.  This includes no overlap with other providers.       ____________________________________     MD JANUARY Nelson / YENNI    DD:  04/17/2017 21:49:47  DT:  04/17/2017 23:11:25    D#:  [id number]  Job#:  [id number]

## 2017-04-18 NOTE — DISCHARGE INSTRUCTIONS
Discharge Instructions    Discharged to home by car with self. Discharged via wheelchair, hospital escort: Yes.  Special equipment needed: Not Applicable    Be sure to schedule a follow-up appointment with your primary care doctor or any specialists as instructed.     Discharge Plan:   Diet Plan: Discussed  Activity Level: Discussed  Smoking Cessation Offered: Patient Refused  Confirmed Follow up Appointment: Patient to Call and Schedule Appointment  Confirmed Symptoms Management: Discussed  Medication Reconciliation Updated: Yes  Influenza Vaccine Indication: Patient Refuses    I understand that a diet low in cholesterol, fat, and sodium is recommended for good health. Unless I have been given specific instructions below for another diet, I accept this instruction as my diet prescription.   Other diet: regular    Special Instructions: None    · Is patient discharged on Warfarin / Coumadin?   No     · Is patient Post Blood Transfusion?  No    Depression / Suicide Risk    As you are discharged from this [facility] facility, it is important to learn how to keep safe from harming yourself.    Recognize the warning signs:  · Abrupt changes in personality, positive or negative- including increase in energy   · Giving away possessions  · Change in eating patterns- significant weight changes-  positive or negative  · Change in sleeping patterns- unable to sleep or sleeping all the time   · Unwillingness or inability to communicate  · Depression  · Unusual sadness, discouragement and loneliness  · Talk of wanting to die  · Neglect of personal appearance   · Rebelliousness- reckless behavior  · Withdrawal from people/activities they love  · Confusion- inability to concentrate     If you or a loved one observes any of these behaviors or has concerns about self-harm, here's what you can do:  · Talk about it- your feelings and reasons for harming yourself  · Remove any means that you might use to hurt yourself (examples:  pills, rope, extension cords, firearm)  · Get professional help from the community (Mental Health, Substance Abuse, psychological counseling)  · Do not be alone:Call your Safe Contact- someone whom you trust who will be there for you.  · Call your local CRISIS HOTLINE 206-9421 or 135-556-4244  · Call your local Children's Mobile Crisis Response Team [place] (833) 648-7795 or www.OffersBy.Me  · Call the toll free National Suicide Prevention Hotlines   · National Suicide Prevention Lifeline 669-252-PXHR (3525)  · National Hope Line Network 800-SUICIDE (186-3336)

## 2017-04-24 ENCOUNTER — APPOINTMENT (OUTPATIENT)
Dept: BEHAVIORAL HEALTH | Facility: PHYSICIAN GROUP | Age: 37
End: 2017-04-24
Payer: COMMERCIAL

## 2017-05-01 ENCOUNTER — OFFICE VISIT (OUTPATIENT)
Dept: MEDICAL GROUP | Facility: PHYSICIAN GROUP | Age: 37
End: 2017-05-01
Payer: COMMERCIAL

## 2017-05-01 VITALS
DIASTOLIC BLOOD PRESSURE: 80 MMHG | HEART RATE: 74 BPM | OXYGEN SATURATION: 95 % | RESPIRATION RATE: 18 BRPM | WEIGHT: 293 LBS | TEMPERATURE: 98 F | HEIGHT: 71 IN | BODY MASS INDEX: 41.02 KG/M2 | SYSTOLIC BLOOD PRESSURE: 132 MMHG

## 2017-05-01 DIAGNOSIS — R20.0 NUMBNESS: ICD-10-CM

## 2017-05-01 DIAGNOSIS — G89.29 CHRONIC MIDLINE LOW BACK PAIN WITHOUT SCIATICA: ICD-10-CM

## 2017-05-01 DIAGNOSIS — M79.2 NEUROGENIC PAIN: ICD-10-CM

## 2017-05-01 DIAGNOSIS — R32 INCONTINENCE IN FEMALE: ICD-10-CM

## 2017-05-01 DIAGNOSIS — F25.9 SCHIZOAFFECTIVE DISORDER, UNSPECIFIED TYPE (HCC): ICD-10-CM

## 2017-05-01 DIAGNOSIS — R11.2 NON-INTRACTABLE VOMITING WITH NAUSEA, UNSPECIFIED VOMITING TYPE: ICD-10-CM

## 2017-05-01 DIAGNOSIS — M54.50 CHRONIC MIDLINE LOW BACK PAIN WITHOUT SCIATICA: ICD-10-CM

## 2017-05-01 DIAGNOSIS — Z86.718 HISTORY OF DVT (DEEP VEIN THROMBOSIS): ICD-10-CM

## 2017-05-01 DIAGNOSIS — R19.8 PAIN ASSOCIATED WITH DEFECATION: ICD-10-CM

## 2017-05-01 DIAGNOSIS — R29.898 WEAKNESS OF RIGHT LEG: ICD-10-CM

## 2017-05-01 DIAGNOSIS — M54.16 LUMBAR NERVE ROOT COMPRESSION: ICD-10-CM

## 2017-05-01 DIAGNOSIS — E66.01 MORBID OBESITY WITH BMI OF 45.0-49.9, ADULT (HCC): ICD-10-CM

## 2017-05-01 PROCEDURE — 99203 OFFICE O/P NEW LOW 30 MIN: CPT | Performed by: NURSE PRACTITIONER

## 2017-05-01 RX ORDER — IBUPROFEN 800 MG/1
800 TABLET ORAL DAILY
Refills: 0 | COMMUNITY
Start: 2017-04-18 | End: 2018-05-22

## 2017-05-01 RX ORDER — PREGABALIN 100 MG/1
100 CAPSULE ORAL 2 TIMES DAILY
COMMUNITY
End: 2018-05-22

## 2017-05-01 RX ORDER — CLINDAMYCIN HYDROCHLORIDE 300 MG/1
300 CAPSULE ORAL 4 TIMES DAILY
Refills: 0 | COMMUNITY
Start: 2017-04-26 | End: 2018-03-27

## 2017-05-01 RX ORDER — OXYCODONE HYDROCHLORIDE 10 MG/1
10 TABLET ORAL EVERY 4 HOURS PRN
Refills: 0 | COMMUNITY
Start: 2017-03-31 | End: 2020-01-20

## 2017-05-01 ASSESSMENT — PAIN SCALES - GENERAL: PAINLEVEL: 4=SLIGHT-MODERATE PAIN

## 2017-05-01 ASSESSMENT — PATIENT HEALTH QUESTIONNAIRE - PHQ9: CLINICAL INTERPRETATION OF PHQ2 SCORE: 0

## 2017-05-01 NOTE — ASSESSMENT & PLAN NOTE
Chronic in nature. Patient states that symptoms started after her hysterectomy. Patient states that since her hysterectomy  she has noticed severe pain with defecation. States the pain encompasses her entire lower abdomen. States that it is crampy and stops after she is finished going to the bathroom. Patient states that it happens whether she is constipated or not states that it is not related to changes in stool. Patient states that she did have a scan that showed an inguinal hernia at one point but is unable to locate record. Discussed with patient that considering extensive abdominal surgery including bladder sling, hysterectomy, vaginal suspension, enterocele repair, immediate concern would be ruling out adhesion or complication from surgery patient is encouraged to follow up with the physician who completed the surgery.
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
## 2017-05-01 NOTE — ASSESSMENT & PLAN NOTE
Chronic in nature. Patient states that she is to weigh approximately 730 pounds and has lost a lot of weight. Patient has considerable weight loss surgery in the past but does not wish to pursue this. Patient's  is a diabetic patient states that she is the same foods that he does and is watching portion sizes. Patient states that they cook at home and eat all foods. Patient does state that she lacks exercise states that she has difficulty with exercise related to pain. Discussed awkward aerobics with patient patient is amenable to finding out more information about possible aqua aerobics.
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
## 2017-05-01 NOTE — ASSESSMENT & PLAN NOTE
Chronic in nature. Stable per patient. Patient is currently following up with neurosurgery regarding this issue.
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
## 2017-05-01 NOTE — ASSESSMENT & PLAN NOTE
Chronic in nature. Stable. Patient follows with neurosurgery regarding this issue. Denies any changes in numbness in her right leg, states that she is compliant with pain management and neurosurgery regarding this issue.

## 2017-05-01 NOTE — ASSESSMENT & PLAN NOTE
"Chronic in nature. Patient states that this issue started after her hysterectomy. Patient states that she did discuss this with her neurosurgeon who does not believe that this is related to her back. Patient states that her main issue is \"leaking urine\" and that she never has a \"urge\" to urinate. Patient states that she drinks water throughout the day and just uses the restroom every few hours. Patient did have hysterectomy with Dr. Trevino, patient will be referred back to his office at this time as issue may be related to previous surgery.  "

## 2017-05-01 NOTE — Clinical Note
[facility]  JEWELS HawkinsRMATHEUS  [address]  [place] [address]  Fax: [phone]   Authorization for Release/Disclosure of   Protected Health Information   Name: EBONY TORRES : 1980 SSN: [id number]   Address: [address] [place] [address] [address]  [place] [address] Phone:    [phone] (home)    I authorize the entity listed below to release/disclose the PHI below to:   [facility]/JEWELS HawkinsRMATHEUS and BARBARA Hawkins   Provider or Entity Name:  Dr.Helen Sanchez   [facility]    Address   City, [facility], [facility]   Phone:      Fax:     Reason for request: continuity of care   Information to be released:    [  ] LAST COLONOSCOPY,  including any PATH REPORT and follow-up  [  ] LAST FIT/COLOGUARD RESULT [  ] LAST DEXA  [  ] LAST MAMMOGRAM  [  ] LAST PAP  [  ] LAST LABS [  ] RETINA EXAM REPORT  [  ] IMMUNIZATION RECORDS  [  ] Release all info      [  ] Check here and initial the line next to each item to release ALL health information INCLUDING  _____ Care and treatment for drug and / or alcohol abuse  _____ HIV testing, infection status, or AIDS  _____ Genetic Testing    DATES OF SERVICE OR TIME PERIOD TO BE DISCLOSED: _____________  I understand and acknowledge that:  * This Authorization may be revoked at any time by you in writing, except if your health information has already been used or disclosed.  * Your health information that will be used or disclosed as a result of you signing this authorization could be re-disclosed by the recipient. If this occurs, your re-disclosed health information may no longer be protected by State or Federal laws.  * You may refuse to sign this Authorization. Your refusal will not affect your ability to obtain treatment.  * This Authorization becomes effective upon signing and will  on (date) __________.      If no date is indicated, this Authorization will  one (1) year from the signature date.    Name:  iCra Ferguson    Signature:   Date:     5/1/2017       PLEASE FAX REQUESTED RECORDS BACK TO: [phone]

## 2017-05-01 NOTE — ASSESSMENT & PLAN NOTE
This is a new problem. Patient states that over the last 4 days she has been having nausea and vomiting when she takes her clindamycin which is for a tooth infection. Discussed with patient that this is a common side effect of antibiotics and that if this is severe she should request a change in the antibiotic, patient is encouraged to follow up with the dentist and continue antibiotics for the infection. Discussed with patient if nausea vomiting continues after the antibiotic is completed she should follow up regarding that issue.

## 2017-05-01 NOTE — ASSESSMENT & PLAN NOTE
Chronic in nature. Stable. Patient states that she has severe nerve pain lateral thigh right side. Patient is currently taking Lyrica 100 mg twice daily for this condition. Patient states that this medication is working well and is managed by pain management at this time.

## 2017-05-01 NOTE — MR AVS SNAPSHOT
"        Cirakiara Neffrenny Ferguson   2017 8:40 AM   Office Visit   MRN: [id number]    Department:  [facility]   Dept Phone:  [phone]    Description:  Female : 1980   Provider:  BARBARA Hawkins           Reason for Visit     Establish Care New Patient     Hypertension x 1 month       Allergies as of 2017     Allergen Noted Reactions    Contrast Media With Iodine [Iodine] 2011       Dilaudid [Hydromorphone Hcl] 2011   Hives      You were diagnosed with     Incontinence in female   [9828765]       Pain associated with defecation   [581152]       History of DVT (deep vein thrombosis)   [471581]       Chronic midline low back pain without sciatica   [3927016]       Non-intractable vomiting with nausea, unspecified vomiting type   [6404769]       Neurogenic pain   [094312]       Morbid obesity with BMI of 45.0-49.9, adult (CMS-HCC)   [386133]         Vital Signs     Blood Pressure Pulse Temperature Respirations Height Weight    132/80 mmHg 74 36.7 °C (98 °F) 18 1.803 m (5' 11\") 157.852 kg (348 lb)    Body Mass Index Oxygen Saturation Last Menstrual Period Breastfeeding? Smoking Status       48.56 kg/m2 95% 2015 No Current Every Day Smoker       Basic Information     Date Of Birth Sex Race Ethnicity Preferred Language    1980 Female Black or  Non- English      Your appointments     May 15, 2017  9:00 AM   Follow Up Med Management with Emery Michaels M.D.   [facility] [address] [place] [address])    [address] [place] [address]   [phone]              Problem List              ICD-10-CM Priority Class Noted - Resolved    Obesity, morbid (more than 100 lbs over ideal weight or BMI > 40) (CMS-HCC) E66.01   2011 - Present    Neurogenic pain M79.2   2011 - Present    Childhood asthma J45.909   2011 - Present    Polycystic ovary syndrome E28.2   2011 - Present    N&V (nausea and vomiting) R11.2   2011 - " Present    Back pain M54.9   11/3/2012 - Present    Gait disturbance R26.9   11/3/2012 - Present    Weakness of right leg M62.81   11/3/2012 - Present    Lumbar nerve root compression M54.16 High  11/5/2012 - Present    Numbness R20.0   3/7/2014 - Present    History of DVT (deep vein thrombosis) Z86.718   5/1/2017 - Present    Morbid obesity with BMI of 45.0-49.9, adult (HCC) E66.01, Z68.42   5/1/2017 - Present      Health Maintenance        Date Due Completion Dates    IMM DTaP/Tdap/Td Vaccine (1 - Tdap) 7/12/1998 ---    PAP SMEAR 7/12/2000 ---            Current Immunizations     Influenza TIV (IM) 10/9/2013, 11/3/2012  2:42 PM    Influenza Vaccine Quad Inj (Pf) 12/6/2014  2:03 PM    Pneumococcal polysaccharide vaccine (PPSV-23) 12/6/2014  2:05 PM, 11/3/2011      Below and/or attached are the medications your provider expects you to take. Review all of your home medications and newly ordered medications with your provider and/or pharmacist. Follow medication instructions as directed by your provider and/or pharmacist. Please keep your medication list with you and share with your provider. Update the information when medications are discontinued, doses are changed, or new medications (including over-the-counter products) are added; and carry medication information at all times in the event of emergency situations     Allergies:  CONTRAST MEDIA WITH IODINE - (reactions not documented)     DILAUDID - Hives               Medications  Valid as of: May 01, 2017 -  9:36 AM    Generic Name Brand Name Tablet Size Instructions for use    Clindamycin HCl (Cap) CLEOCIN 300 MG Take 300 mg by mouth 4 times a day.        Ibuprofen (Tab) MOTRIN 800 MG Take 800 mg by mouth every day.        Melatonin (Cap) Melatonin 10 MG Take  by mouth.        OxyCODONE HCl (Tab) ROXICODONE 5 MG Take 10 mg by mouth every four hours as needed. 1-2 tablets by mouth every 4 hrs as needed for pain.  Indications: Moderate to Severe Pain         OxyCODONE HCl (Tab) ROXICODONE 10 MG Take 10 mg by mouth 4 times a day.        Pregabalin (Cap) LYRICA 100 MG Take 100 mg by mouth 2 times a day.        Ziprasidone HCl (Cap) GEODON 80 MG Two tabs by mouth at night after meal.        .                 Medicines prescribed today were sent to:     [facility] [id number] [place] [address], [place] - [address] [facility]    [address] [facility] [place] [address]    Phone: [phone] Fax: [phone]    Open 24 Hours?: Yes      Medication refill instructions:       If your prescription bottle indicates you have medication refills left, it is not necessary to call your provider’s office. Please contact your pharmacy and they will refill your medication.    If your prescription bottle indicates you do not have any refills left, you may request refills at any time through one of the following ways: The online [employer] system (except Urgent Care), by calling your provider’s office, or by asking your pharmacy to contact your provider’s office with a refill request. Medication refills are processed only during regular business hours and may not be available until the next business day. Your provider may request additional information or to have a follow-up visit with you prior to refilling your medication.   *Please Note: Medication refills are assigned a new Rx number when refilled electronically. Your pharmacy may indicate that no refills were authorized even though a new prescription for the same medication is available at the pharmacy. Please request the medicine by name with the pharmacy before contacting your provider for a refill.        Your To Do List     Future Labs/Procedures Complete By Expires    COMP METABOLIC PANEL  As directed 5/1/2018    LIPID PROFILE  As directed 5/1/2018      Referral     A referral request has been sent to our patient care coordination department. Please allow 3-5 business days for us to process this request and contact you  either by phone or mail. If you do not hear from us by the 5th business day, please call us at [phone].           [employer] Access Code: Activation code not generated  Current P[employer] Status: Active          Quit Tobacco Information     Do you want to quit using tobacco?    Quitting tobacco decreases risks of cancer, heart and lung disease, increases life expectancy, improves sense of taste and smell, and increases spending money, among other benefits.    If you are thinking about quitting, we can help.  • [facility] Quit Tobacco Program: 917.618.3246  o Program occurs weekly for four weeks and includes pharmacist consultation on products to support quitting smoking or chewing tobacco. A provider referral is needed for pharmacist consultation.  • Tobacco Users Help Hotline: 9-689-QUIT-NOW (426-8919) or https://[place].quitlogix.org/  o Free, confidential telephone and online coaching for [place] residents. Sessions are designed on a schedule that is convenient for you. Eligible clients receive free nicotine replacement therapy.  • Nationally: www.smokefree.gov  o Information and professional assistance to support both immediate and long-term needs as you become, and remain, a non-smoker. Smokefree.gov allows you to choose the help that best fits your needs.

## 2017-05-01 NOTE — ASSESSMENT & PLAN NOTE
Chronic in nature. Stable. Patient states that has been no change in the weakness in her right leg. Patient is followed by neurosurgery regarding this issue.

## 2017-05-15 ENCOUNTER — APPOINTMENT (OUTPATIENT)
Dept: BEHAVIORAL HEALTH | Facility: PHYSICIAN GROUP | Age: 37
End: 2017-05-15
Payer: COMMERCIAL

## 2017-08-11 ENCOUNTER — OFFICE VISIT (OUTPATIENT)
Dept: BEHAVIORAL HEALTH | Facility: PHYSICIAN GROUP | Age: 37
End: 2017-08-11
Payer: COMMERCIAL

## 2017-08-11 VITALS
BODY MASS INDEX: 41.02 KG/M2 | HEART RATE: 76 BPM | DIASTOLIC BLOOD PRESSURE: 78 MMHG | TEMPERATURE: 97.9 F | SYSTOLIC BLOOD PRESSURE: 128 MMHG | HEIGHT: 71 IN | RESPIRATION RATE: 14 BRPM | WEIGHT: 293 LBS

## 2017-08-11 DIAGNOSIS — F25.0 SCHIZOAFFECTIVE DISORDER, BIPOLAR TYPE (HCC): ICD-10-CM

## 2017-08-11 DIAGNOSIS — F51.01 PRIMARY INSOMNIA: ICD-10-CM

## 2017-08-11 PROCEDURE — 99213 OFFICE O/P EST LOW 20 MIN: CPT | Performed by: PSYCHIATRY & NEUROLOGY

## 2017-08-11 PROCEDURE — 90833 PSYTX W PT W E/M 30 MIN: CPT | Performed by: PSYCHIATRY & NEUROLOGY

## 2017-08-11 RX ORDER — ZIPRASIDONE HYDROCHLORIDE 80 MG/1
CAPSULE ORAL
Qty: 60 CAP | Refills: 5 | Status: SHIPPED | OUTPATIENT
Start: 2017-08-11 | End: 2018-03-27

## 2017-08-11 NOTE — PROGRESS NOTES
"[facility]  PSYCHIATRIC FOLLOW-UP NOTE    Name: Cira Ferguson  MRN: [id number]  : 1980  Age: 37 y.o.  Date of assessment: 2017  PCP: BARBARA Hawkins  Persons in attendance: Patient  REASON FOR VISIT/CHIEF COMPLAINT (as stated by Patient):  Cira Ferguson is a 37 y.o., Black or  female, attending follow-up appointment for   Chief Complaint   Patient presents with   • Medication Management     The patient is seen today for folow up and her  had a stroke and he is not doing weel.    .      HISTORY OF PRESENT ILLNESS:    This is a 36 yo female , seen today for follow up. The patients  was admitted in the hospital with stroke and he was diagnosed wit diabetes. The patient was upset and she relapse once. The patients family is supportive. The patient requested a refill on her medications.      PSYCHOSOCIAL CHANGES SINCE PREVIOUS CONTACT:  The patient denied psychosis.    RESPONSE TO TREATMENT:  Good.    MEDICATION SIDE EFFECTS:  Denied.    REVIEW OF SYSTEMS:        Constitutional positive - obsity   Eyes negative   Ears/Nose/Mouth/Throat negative   Cardiovascular negative   Respiratory negative   Gastrointestinal negative   Genitourinary Polycystic ovarian syndrome.    Muscular negative   Integumentary negative   Neurological negative   Endocrine negative   Hematologic/Lymphatic positive - history of DVT, hep C       PSYCHIATRIC EXAMINATION/MENTAL STATUS  /78 mmHg  Pulse 76  Temp(Src) 36.6 °C (97.9 °F)  Resp 14  Ht 1.803 m (5' 10.98\")  Wt 161.481 kg (356 lb)  BMI 49.67 kg/m2  LMP 2015  Participation: Active verbal participation and Attentive  Grooming:Casual  Orientation: Alert and Fully Oriented  Eye contact: Good  Behavior:Tense   Mood: Anxious  Affect: Anxious  Thought process: Logical and Goal-directed  Thought content:  Within normal limits  Speech: Rate within normal limits and Volume within normal " limits  Perception:  Within normal limits  Memory:  No gross evidence of memory deficits  Insight: Good  Judgment: Good  Family/couple interaction observations:   Other:    Current risk:    Suicide: Low   Homicide: Low   Self-harm: Low  Relapse: High  Other:   Crisis Safety Plan reviewed?Yes  If evidence of imminent risk is present, intervention/plan:    Medical Records/Labs/Diagnostic Tests Reviewed: yes.    Medical Records/Labs/Diagnostic Tests Ordered: none.    DIAGNOSTIC IMPRESSION(S):  1. Schizoaffective disorder, bipolar type (CMS-HCC)    2. Primary insomnia           ASSESSMENT AND PLAN:    1. Schizoaffective disorder, bipolar type.  Geodon 80 mg two at night # 60 refills five.    2. Cannabis use disorder  Recommended support group.    3. Follow up in six weeks.     16 minutes were spent in psychotherapy.  (If greater than 16 minutes, add 23543 code)   Topics addressed in psychotherapy include:  The patient is going through lot of stress and she agreed to use her family support.  Keep her daily routine and sleep cycle.  The patient using her coping skills and avoid unhealthy behavior.  Using relaxation and breathing exercise.  Emery Michaels M.D.

## 2017-09-13 ENCOUNTER — HOSPITAL ENCOUNTER (EMERGENCY)
Dept: HOSPITAL 8 - ED | Age: 37
Discharge: HOME | End: 2017-09-13
Payer: COMMERCIAL

## 2017-09-13 VITALS — HEIGHT: 71 IN | BODY MASS INDEX: 41.02 KG/M2 | WEIGHT: 293 LBS

## 2017-09-13 VITALS — SYSTOLIC BLOOD PRESSURE: 113 MMHG | DIASTOLIC BLOOD PRESSURE: 66 MMHG

## 2017-09-13 DIAGNOSIS — F25.9: ICD-10-CM

## 2017-09-13 DIAGNOSIS — R41.0: ICD-10-CM

## 2017-09-13 DIAGNOSIS — M41.9: ICD-10-CM

## 2017-09-13 DIAGNOSIS — M48.02: Primary | ICD-10-CM

## 2017-09-13 LAB
BUN SERPL-MCNC: 10 MG/DL (ref 7–18)
HCT VFR BLD CALC: 43 % (ref 34.6–47.8)
HGB BLD-MCNC: 14.4 G/DL (ref 11.7–16.4)
IS PT STATUS REG ER OR PRE ER?: YES
WBC # BLD AUTO: 10.9 X10^3/UL (ref 3.4–10)

## 2017-09-13 PROCEDURE — 93005 ELECTROCARDIOGRAM TRACING: CPT

## 2017-09-13 PROCEDURE — 80048 BASIC METABOLIC PNL TOTAL CA: CPT

## 2017-09-13 PROCEDURE — 93970 EXTREMITY STUDY: CPT

## 2017-09-13 PROCEDURE — 71010: CPT

## 2017-09-13 PROCEDURE — 82040 ASSAY OF SERUM ALBUMIN: CPT

## 2017-09-13 PROCEDURE — 83880 ASSAY OF NATRIURETIC PEPTIDE: CPT

## 2017-09-13 PROCEDURE — 81003 URINALYSIS AUTO W/O SCOPE: CPT

## 2017-09-13 PROCEDURE — 70450 CT HEAD/BRAIN W/O DYE: CPT

## 2017-09-13 PROCEDURE — 84484 ASSAY OF TROPONIN QUANT: CPT

## 2017-09-13 PROCEDURE — 36415 COLL VENOUS BLD VENIPUNCTURE: CPT

## 2017-09-13 PROCEDURE — 85025 COMPLETE CBC W/AUTO DIFF WBC: CPT

## 2017-09-13 PROCEDURE — 99285 EMERGENCY DEPT VISIT HI MDM: CPT

## 2017-09-13 PROCEDURE — 72125 CT NECK SPINE W/O DYE: CPT

## 2017-12-26 ENCOUNTER — OFFICE VISIT (OUTPATIENT)
Dept: BEHAVIORAL HEALTH | Facility: PHYSICIAN GROUP | Age: 37
End: 2017-12-26
Payer: COMMERCIAL

## 2017-12-26 VITALS
DIASTOLIC BLOOD PRESSURE: 80 MMHG | TEMPERATURE: 97.9 F | RESPIRATION RATE: 16 BRPM | HEIGHT: 71 IN | BODY MASS INDEX: 41.02 KG/M2 | WEIGHT: 293 LBS | SYSTOLIC BLOOD PRESSURE: 130 MMHG | HEART RATE: 80 BPM

## 2017-12-26 DIAGNOSIS — F51.01 PRIMARY INSOMNIA: ICD-10-CM

## 2017-12-26 DIAGNOSIS — F25.0 SCHIZOAFFECTIVE DISORDER, BIPOLAR TYPE (HCC): ICD-10-CM

## 2017-12-26 PROCEDURE — 99213 OFFICE O/P EST LOW 20 MIN: CPT | Performed by: PSYCHIATRY & NEUROLOGY

## 2017-12-26 PROCEDURE — 90833 PSYTX W PT W E/M 30 MIN: CPT | Performed by: PSYCHIATRY & NEUROLOGY

## 2017-12-26 ASSESSMENT — PATIENT HEALTH QUESTIONNAIRE - PHQ9
SUM OF ALL RESPONSES TO PHQ9 QUESTIONS 1 AND 2: 2
4. FEELING TIRED OR HAVING LITTLE ENERGY: 1
SUM OF ALL RESPONSES TO PHQ QUESTIONS 1-9: 8
7. TROUBLE CONCENTRATING ON THINGS, SUCH AS READING THE NEWSPAPER OR WATCHING TELEVISION: 1
8. MOVING OR SPEAKING SO SLOWLY THAT OTHER PEOPLE COULD HAVE NOTICED. OR THE OPPOSITE, BEING SO FIGETY OR RESTLESS THAT YOU HAVE BEEN MOVING AROUND A LOT MORE THAN USUAL: 1
6. FEELING BAD ABOUT YOURSELF - OR THAT YOU ARE A FAILURE OR HAVE LET YOURSELF OR YOUR FAMILY DOWN: 1
9. THOUGHTS THAT YOU WOULD BE BETTER OFF DEAD, OR OF HURTING YOURSELF: 0
1. LITTLE INTEREST OR PLEASURE IN DOING THINGS: 1
2. FEELING DOWN, DEPRESSED, IRRITABLE, OR HOPELESS: 1
3. TROUBLE FALLING OR STAYING ASLEEP OR SLEEPING TOO MUCH: 1
5. POOR APPETITE OR OVEREATING: 1
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
## 2017-12-29 ENCOUNTER — DOCUMENTATION (OUTPATIENT)
Dept: BEHAVIORAL HEALTH | Facility: PHYSICIAN GROUP | Age: 37
End: 2017-12-29

## 2018-01-16 ENCOUNTER — OFFICE VISIT (OUTPATIENT)
Dept: BEHAVIORAL HEALTH | Facility: PHYSICIAN GROUP | Age: 38
End: 2018-01-16
Payer: COMMERCIAL

## 2018-01-16 VITALS
TEMPERATURE: 98.2 F | HEART RATE: 78 BPM | WEIGHT: 293 LBS | RESPIRATION RATE: 16 BRPM | BODY MASS INDEX: 41.02 KG/M2 | SYSTOLIC BLOOD PRESSURE: 132 MMHG | DIASTOLIC BLOOD PRESSURE: 82 MMHG | HEIGHT: 71 IN

## 2018-01-16 DIAGNOSIS — F25.0 SCHIZOAFFECTIVE DISORDER, BIPOLAR TYPE (HCC): ICD-10-CM

## 2018-01-16 PROCEDURE — 99213 OFFICE O/P EST LOW 20 MIN: CPT | Performed by: PSYCHIATRY & NEUROLOGY

## 2018-01-16 PROCEDURE — 90833 PSYTX W PT W E/M 30 MIN: CPT | Performed by: PSYCHIATRY & NEUROLOGY

## 2018-01-16 RX ORDER — HALOPERIDOL 5 MG/1
5 TABLET ORAL
Qty: 30 TAB | Refills: 3 | Status: SHIPPED | OUTPATIENT
Start: 2018-01-16 | End: 2018-06-04 | Stop reason: SDUPTHER

## 2018-01-16 RX ORDER — TOPIRAMATE 50 MG/1
50 TABLET, FILM COATED ORAL 2 TIMES DAILY
Qty: 60 TAB | Refills: 3 | Status: SHIPPED | OUTPATIENT
Start: 2018-01-16 | End: 2018-06-04 | Stop reason: SDUPTHER

## 2018-01-16 ASSESSMENT — PATIENT HEALTH QUESTIONNAIRE - PHQ9
1. LITTLE INTEREST OR PLEASURE IN DOING THINGS: 1
SUM OF ALL RESPONSES TO PHQ QUESTIONS 1-9: 6
9. THOUGHTS THAT YOU WOULD BE BETTER OFF DEAD, OR OF HURTING YOURSELF: 0
SUM OF ALL RESPONSES TO PHQ9 QUESTIONS 1 AND 2: 2
6. FEELING BAD ABOUT YOURSELF - OR THAT YOU ARE A FAILURE OR HAVE LET YOURSELF OR YOUR FAMILY DOWN: 1
8. MOVING OR SPEAKING SO SLOWLY THAT OTHER PEOPLE COULD HAVE NOTICED. OR THE OPPOSITE, BEING SO FIGETY OR RESTLESS THAT YOU HAVE BEEN MOVING AROUND A LOT MORE THAN USUAL: 0
2. FEELING DOWN, DEPRESSED, IRRITABLE, OR HOPELESS: 1
5. POOR APPETITE OR OVEREATING: 1
3. TROUBLE FALLING OR STAYING ASLEEP OR SLEEPING TOO MUCH: 1
7. TROUBLE CONCENTRATING ON THINGS, SUCH AS READING THE NEWSPAPER OR WATCHING TELEVISION: 0
4. FEELING TIRED OR HAVING LITTLE ENERGY: 1
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
## 2018-02-02 ENCOUNTER — OFFICE VISIT (OUTPATIENT)
Dept: MEDICAL GROUP | Facility: PHYSICIAN GROUP | Age: 38
End: 2018-02-02
Payer: COMMERCIAL

## 2018-02-02 VITALS
WEIGHT: 293 LBS | OXYGEN SATURATION: 98 % | SYSTOLIC BLOOD PRESSURE: 106 MMHG | DIASTOLIC BLOOD PRESSURE: 68 MMHG | HEIGHT: 71 IN | RESPIRATION RATE: 16 BRPM | HEART RATE: 87 BPM | TEMPERATURE: 97.2 F | BODY MASS INDEX: 41.02 KG/M2

## 2018-02-02 DIAGNOSIS — N64.4 PAIN OF LEFT BREAST: ICD-10-CM

## 2018-02-02 DIAGNOSIS — L02.92 BOIL: ICD-10-CM

## 2018-02-02 PROCEDURE — 99214 OFFICE O/P EST MOD 30 MIN: CPT | Performed by: NURSE PRACTITIONER

## 2018-02-02 RX ORDER — SULFAMETHOXAZOLE AND TRIMETHOPRIM 800; 160 MG/1; MG/1
1 TABLET ORAL 2 TIMES DAILY
Qty: 14 TAB | Refills: 0 | Status: SHIPPED | OUTPATIENT
Start: 2018-02-02 | End: 2018-02-09

## 2018-02-02 RX ORDER — GABAPENTIN 300 MG/1
300 CAPSULE ORAL 3 TIMES DAILY
COMMUNITY
End: 2019-02-07 | Stop reason: CLARIF

## 2018-02-02 ASSESSMENT — PAIN SCALES - GENERAL: PAINLEVEL: 4=SLIGHT-MODERATE PAIN

## 2018-02-02 NOTE — ASSESSMENT & PLAN NOTE
This is a new problem. States she initially noticed pain 6 months ago, but states that it has become worse over the last week. She states her bra is causing pain at this point. States she is not noticing discharge from the nipple, has not noticed change in skin texture near the nipple, but there is change where the lesion is. States breast move normally. States she has popped the boil in the past. States she noticed it yesterday and there is a red hot area around it as well as pain. It is not oozing. The pain in her nipple is worse with pressure. States the pain only happens when there is pressure. States she has not tried OTC medications for pain as it is intermittent pain.

## 2018-02-02 NOTE — PROGRESS NOTES
Chief Complaint   Patient presents with   • Breast Problem     pain x 6 months        HISTORY OF PRESENT ILLNESS: Patient is a 37 y.o. female established patient who presents today to discuss pain and boil.    Pain of left breast  This is a new problem. States she initially noticed pain 6 months ago, but states that it has become worse over the last week. She states her bra is causing pain at this point. States she is not noticing discharge from the nipple, has not noticed change in skin texture near the nipple, but there is change where the lesion is. States breast move normally. States she has popped the boil in the past. States she noticed it yesterday and there is a red hot area around it as well as pain. It is not oozing. The pain in her nipple is worse with pressure. States the pain only happens when there is pressure. States she has not tried OTC medications for pain as it is intermittent pain.     Boil  Located at bra strap under left breast.      Patient Active Problem List    Diagnosis Date Noted   • Lumbar nerve root compression 11/05/2012     Priority: High   • Pain of left breast 02/02/2018   • Boil 02/02/2018   • History of DVT (deep vein thrombosis) 05/01/2017   • Morbid obesity with BMI of 45.0-49.9, adult ([facility]) 05/01/2017   • Schizoaffective disorder (CMS-HCC) 05/01/2017   • Incontinence in female 05/01/2017   • Pain associated with defecation 05/01/2017   • Numbness 03/07/2014   • Back pain 11/03/2012   • Gait disturbance 11/03/2012   • Weakness of right leg 11/03/2012   • N&V (nausea and vomiting) 05/30/2011   • Neurogenic pain 05/23/2011   • Childhood asthma 05/23/2011   • Polycystic ovary syndrome 05/23/2011       Allergies:Contrast media with iodine [iodine] and Dilaudid [hydromorphone hcl]    Current Outpatient Prescriptions   Medication Sig Dispense Refill   • gabapentin (NEURONTIN) 300 MG Cap Take 300 mg by mouth 3 times a day.     • sulfamethoxazole-trimethoprim (BACTRIM DS) 800-160 MG  tablet Take 1 Tab by mouth 2 times a day for 7 days. 14 Tab 0   • oxycodone immediate release (ROXICODONE) 10 MG immediate release tablet Take 10 mg by mouth 4 times a day.  0   • haloperidol (HALDOL) 5 MG Tab Take 1 Tab by mouth every bedtime. 30 Tab 3   • topiramate (TOPAMAX) 50 MG tablet Take 1 Tab by mouth 2 times a day. 60 Tab 3   • ziprasidone (GEODON) 80 MG Cap Two tab by mouth at night 60 Cap 5   • pregabalin (LYRICA) 100 MG Cap Take 100 mg by mouth 2 times a day.     • clindamycin (CLEOCIN) 300 MG Cap Take 300 mg by mouth 4 times a day.  0   • ibuprofen (MOTRIN) 800 MG Tab Take 800 mg by mouth every day.  0   • Melatonin 10 MG Cap Take  by mouth.     • ziprasidone (GEODON) 80 MG Cap Two tabs by mouth at night after meal. 60 Cap 5   • oxycodone, immediate release, (ROXICODONE) 5 MG TABS Take 10 mg by mouth every four hours as needed. 1-2 tablets by mouth every 4 hrs as needed for pain.  Indications: Moderate to Severe Pain       No current facility-administered medications for this visit.        Social History   Substance Use Topics   • Smoking status: Current Every Day Smoker     Packs/day: 0.50     Years: 15.00     Types: Cigarettes   • Smokeless tobacco: Never Used   • Alcohol use No       Family Status   Relation Status   • Mother Alive   • Father Alive   • Sister Alive   • Brother Alive   • Son Alive   • Daughter Alive     Family History   Problem Relation Age of Onset   • Heart Disease Mother    • Diabetes Mother    • Lung Disease Mother    • Alcohol/Drug Father        Review of Systems:   Constitutional:  Negative for fever, chills, weight loss and malaise/fatigue.   HEENT:  Negative for ear pain, nosebleeds, congestion, sore throat and neck pain.    Eyes:  Negative for blurred vision.   Respiratory:  Negative for cough, sputum production, shortness of breath and wheezing.    Cardiovascular:  Negative for chest pain, palpitations, orthopnea and leg swelling.   Gastrointestinal:  Negative for  "heartburn, nausea, vomiting and abdominal pain.   Genitourinary:  Negative for dysuria, urgency and frequency.   Musculoskeletal:  Negative for myalgias, back pain and joint pain.   Skin:  Negative for rash and itching.   Neurological:  Negative for dizziness, tingling, tremors, sensory change, focal weakness and headaches.   Endo/Heme/Allergies:  Does not bruise/bleed easily.   Psychiatric/Behavioral:  Negative for depression, suicidal ideas and memory loss.  The patient is not nervous/anxious and does not have insomnia.    All other systems reviewed and are negative except as in HPI.    Exam:  Blood pressure 106/68, pulse 87, temperature 36.2 °C (97.2 °F), resp. rate 16, height 1.803 m (5' 10.98\"), weight (!) 155.6 kg (343 lb), last menstrual period 05/01/2015, SpO2 98 %, not currently breastfeeding.  General:  Normal appearing. No distress.  Pulmonary:  Clear to ausculation.  Normal effort. No rales, ronchi, or wheezing.  Cardiovascular:  Regular rate and rhythm without murmur. Carotid and radial pulses are intact and equal bilaterally.  Neurologic:  Grossly nonfocal  Skin:  Warm and dry.  No obvious lesions.  Musculoskeletal:  Normal gait. No extremity cyanosis, clubbing, or edema.  Psych:  Normal mood and affect. Alert and oriented x3. Judgment and insight is normal.  Breasts: Breasts examined seated and supine. No skin changes, peau d'orange or nipple retraction. No discharge. Breast move freely and equally without restriction. No axillary or supraclavicular adenopathy. No masses or nodularity palpable. Tenderness to palpation of left nipple. There is a 1 cm oval-shaped flocculent lesion with white head that is not draining pus there is some erythema and warmth around the lesion.      PLAN:    1. Pain of left breast  Exam is generally within normal limits. Some tenderness to palpation of the nipple as such patient will complete a diagnostic mammogram.  - MA-MAMMO DIAGNOSTIC UNILAT W/AMBREEN W/CAD LEFT; " Future    2. Boil  Considering boil continues to return plan to treat patient with antibiotics at this time.  - sulfamethoxazole-trimethoprim (BACTRIM DS) 800-160 MG tablet; Take 1 Tab by mouth 2 times a day for 7 days.  Dispense: 14 Tab; Refill: 0    Follow-up depending on mammogram results. Patient is encouraged to be seen in the emergency room for chest pain, palpitations, shortness of breath, dizziness, severe abdominal pain or other concerning symptoms.    Please note that this dictation was created using voice recognition software. I have made every reasonable attempt to correct obvious errors, but I expect that there are errors of grammar and possibly content that I did not discover before finalizing the note.    Assessment/Plan:  1. Pain of left breast  MA-MAMMO DIAGNOSTIC UNILAT W/AMBREEN W/CAD LEFT   2. Boil  sulfamethoxazole-trimethoprim (BACTRIM DS) 800-160 MG tablet

## 2018-03-09 ENCOUNTER — HOSPITAL ENCOUNTER (OUTPATIENT)
Dept: RADIOLOGY | Facility: MEDICAL CENTER | Age: 38
End: 2018-03-09
Attending: NURSE PRACTITIONER
Payer: COMMERCIAL

## 2018-03-09 DIAGNOSIS — N64.4 PAIN OF LEFT BREAST: ICD-10-CM

## 2018-03-09 PROCEDURE — G0279 TOMOSYNTHESIS, MAMMO: HCPCS

## 2018-03-09 PROCEDURE — 76642 ULTRASOUND BREAST LIMITED: CPT | Mod: LT

## 2018-03-13 NOTE — PROGRESS NOTES
"[facility]  PSYCHIATRIC FOLLOW-UP NOTE    Name: Cira Ferguson  MRN: [id number]  : 1980  Age: 36 y.o.  Date of assessment: 3/27/2017  PCP: Dee Sanchez M.D.  Persons in attendance: Patient    REASON FOR VISIT/CHIEF COMPLAINT (as stated by Patient):  Cira Ferguson is a 36 y.o.,  or  female, attending follow-up appointment for   Chief Complaint   Patient presents with   • Sleep Problem     The patient is seen today for follow up on her modd disorder and she stopped oxcarbazepine because she started to hallucinate.   .      HISTORY OF PRESENT ILLNESS:    This is a 37 yo female, , disabled, seen today for follow up on her psychosis. The patient tried oxcarbazepine and she hallucinated and was unable to sleep. The patient failed lamotrigine because of rash. The patient can not take trazodone because of interaction with geodon and QTc prolongation. The patient is on oxycodone for pain and she van not take Benzo.     PSYCHOSOCIAL CHANGES SINCE PREVIOUS CONTACT:  Insomnia but denied psychosis.    RESPONSE TO TREATMENT:  Fair.    MEDICATION SIDE EFFECTS:  Denied.    REVIEW OF SYSTEMS:        Constitutional positive - obesity   Eyes negative   Ears/Nose/Mouth/Throat negative   Cardiovascular positive - history of DVT   Respiratory negative   Gastrointestinal negative   Genitourinary positive - neurogenic bladder, PCOS   Muscular negative   Integumentary negative   Neurological positive - cauda aquina syndrome   Endocrine negative   Hematologic/Lymphatic negative       PSYCHIATRIC EXAMINATION/MENTAL STATUS  /70 mmHg  Pulse 82  Temp(Src) 36.4 °C (97.5 °F)  Resp 14  Ht 1.803 m (5' 10.98\")  Wt 166.47 kg (367 lb)  BMI 51.21 kg/m2  [facility] 2014  Participation: Active verbal participation, Attentive and Engaged  Grooming:Casual  Orientation: Alert and Fully Oriented  Eye contact: Good  Behavior:Calm   Mood: Anxious  Affect: Flexible and Full range  Thought process: " Pt appt was cancelled   Logical and Goal-directed  Thought content:  Within normal limits  Speech: Rate within normal limits and Volume within normal limits  Perception:  Within normal limits  Memory:  No gross evidence of memory deficits  Insight: Good  Judgment: Good  Family/couple interaction observations:   Other:    Current risk:    Suicide: Low   Homicide: Low   Self-harm: Low  Relapse: Low  Other:   Crisis Safety Plan reviewed?Yes  If evidence of imminent risk is present, intervention/plan:    Medical Records/Labs/Diagnostic Tests Reviewed: yes.    Medical Records/Labs/Diagnostic Tests Ordered: none.    DIAGNOSTIC IMPRESSION(S):  1. Schizoaffective disorder, bipolar type (CMS-HCC)           ASSESSMENT AND PLAN:  Schizoaffective disorder  Insomnia    Ziprasidone 80 mg two at night  Start rozerem 8 mg at night after discussing risk benefit and alter native. # 30 refills one.  Follow up in two months.      More than 50% of face-to-face time in this 30 minute visit was spent in psychotherapy/counseling.    Topics addressed include:  Good sleep hygiene  Encourage diet and exercise  Avoid stimulant    Emery Michaels M.D.

## 2018-03-19 ENCOUNTER — APPOINTMENT (OUTPATIENT)
Dept: BEHAVIORAL HEALTH | Facility: PHYSICIAN GROUP | Age: 38
End: 2018-03-19
Payer: COMMERCIAL

## 2018-03-20 ENCOUNTER — APPOINTMENT (OUTPATIENT)
Dept: BEHAVIORAL HEALTH | Facility: PHYSICIAN GROUP | Age: 38
End: 2018-03-20
Payer: COMMERCIAL

## 2018-03-27 ENCOUNTER — OFFICE VISIT (OUTPATIENT)
Dept: MEDICAL GROUP | Facility: PHYSICIAN GROUP | Age: 38
End: 2018-03-27
Payer: COMMERCIAL

## 2018-03-27 VITALS
HEIGHT: 71 IN | DIASTOLIC BLOOD PRESSURE: 74 MMHG | TEMPERATURE: 97.6 F | HEART RATE: 60 BPM | SYSTOLIC BLOOD PRESSURE: 110 MMHG | OXYGEN SATURATION: 96 % | WEIGHT: 293 LBS | RESPIRATION RATE: 14 BRPM | BODY MASS INDEX: 41.02 KG/M2

## 2018-03-27 DIAGNOSIS — L02.92 BOIL: ICD-10-CM

## 2018-03-27 PROCEDURE — 99213 OFFICE O/P EST LOW 20 MIN: CPT | Performed by: INTERNAL MEDICINE

## 2018-03-27 RX ORDER — AMOXICILLIN AND CLAVULANATE POTASSIUM 875; 125 MG/1; MG/1
TABLET, FILM COATED ORAL
COMMUNITY
Start: 2018-03-23 | End: 2018-03-27

## 2018-03-27 RX ORDER — DOXYCYCLINE HYCLATE 100 MG
100 TABLET ORAL 2 TIMES DAILY
Qty: 20 TAB | Refills: 0 | Status: SHIPPED | OUTPATIENT
Start: 2018-03-27 | End: 2018-05-07

## 2018-03-27 NOTE — PROGRESS NOTES
Subjective:   Cira Ferguson is a 37 y.o. female here today for boil recurrence     37-year-old female with past medical history DVT, chronic back pain, schizophrenic disorder presenting complaining of recurrence of bilateral intermittent lower genital area. She had one on her left lower breast, one month ago. She has been treated with clinda, Augmentin, bactrim. She had allergy hives from bactrim. She is currently on clinda. 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 She denies fever. She reports that lesions are very painful. She is on pain meds, but pain meds is not working for this pain. Boil on left lower breast is almost resolved. There are two lesions on the bilateral side of major labia, on the side, the right one some mild discharges. No erythema. Pt concerned for MRSA     Current medicines (including changes today)  Current Outpatient Prescriptions   Medication Sig Dispense Refill   • doxycycline (VIBRAMYCIN) 100 MG Tab Take 1 Tab by mouth 2 times a day. 20 Tab 0   • gabapentin (NEURONTIN) 300 MG Cap Take 300 mg by mouth 3 times a day.     • haloperidol (HALDOL) 5 MG Tab Take 1 Tab by mouth every bedtime. 30 Tab 3   • topiramate (TOPAMAX) 50 MG tablet Take 1 Tab by mouth 2 times a day. 60 Tab 3   • pregabalin (LYRICA) 100 MG Cap Take 100 mg by mouth 2 times a day.     • ibuprofen (MOTRIN) 800 MG Tab Take 800 mg by mouth every day.  0   • oxycodone immediate release (ROXICODONE) 10 MG immediate release tablet Take 10 mg by mouth 4 times a day.  0     No current facility-administered medications for this visit.      She  has a past medical history of Bipolar disorder (CMS-HCC); Cervical cancer (CMS-HCC); DVT (deep venous thrombosis) (CMS-HCC) (2013); Fall; Hepatitis C; Infectious disease; IV drug abuse; PE (pulmonary thromboembolism) (CMS-HCC) (2013); Psychiatric disorder; Schizoaffective disorder (CMS-HCC); and Schizophrenia (CMS-HCC).    Current Outpatient Prescriptions   Medication Sig  Dispense Refill   • doxycycline (VIBRAMYCIN) 100 MG Tab Take 1 Tab by mouth 2 times a day. 20 Tab 0   • gabapentin (NEURONTIN) 300 MG Cap Take 300 mg by mouth 3 times a day.     • haloperidol (HALDOL) 5 MG Tab Take 1 Tab by mouth every bedtime. 30 Tab 3   • topiramate (TOPAMAX) 50 MG tablet Take 1 Tab by mouth 2 times a day. 60 Tab 3   • pregabalin (LYRICA) 100 MG Cap Take 100 mg by mouth 2 times a day.     • ibuprofen (MOTRIN) 800 MG Tab Take 800 mg by mouth every day.  0   • oxycodone immediate release (ROXICODONE) 10 MG immediate release tablet Take 10 mg by mouth 4 times a day.  0     No current facility-administered medications for this visit.        Allergies as of 03/27/2018 - Reviewed 03/27/2018   Allergen Reaction Noted   • Contrast media with iodine [iodine]  05/17/2011   • Dilaudid [hydromorphone hcl] Hives 09/18/2011   • Sulfa drugs  03/27/2018       Social History     Social History   • Marital status:      Spouse name: N/A   • Number of children: N/A   • Years of education: N/A     Occupational History   • Not on file.     Social History Main Topics   • Smoking status: Current Every Day Smoker     Packs/day: 0.50     Years: 15.00     Types: Cigarettes   • Smokeless tobacco: Never Used   • Alcohol use No   • Drug use: No   • Sexual activity: Yes     Partners: Male     Other Topics Concern   • Not on file     Social History Narrative   • No narrative on file        Family History   Problem Relation Age of Onset   • Heart Disease Mother    • Diabetes Mother    • Lung Disease Mother    • Alcohol/Drug Father        Past Surgical History:   Procedure Laterality Date   • VAGINAL HYSTERECTOMY SCOPE TOTAL  10/23/2014    Performed by Levar Trevino M.D. at SUR[facility]ERY [facility]   • SALPINGECTOMY  10/23/2014    Performed by Levar Trevino M.D. at [facility]   • ANTERIOR AND POSTERIOR REPAIR  10/23/2014    Performed by Levar Trevino M.D. at SURGERY SAME DAY [facility] ORS   •  "ENTEROCELE REPAIR  10/23/2014    Performed by Levar Trevino M.D. at SURGERY SAME DAY [facility]   • VAGINAL SUSPENSION  10/23/2014    Performed by Levar Trevino M.D. at SURGERY SAME DAY [facility]   • BLADDER SLING FEMALE  10/23/2014    Performed by Levar Trevino M.D. at SURGERY SAME DAY [facility]   • CYSTOSCOPY  10/23/2014    Performed by Levar Trevino M.D. at SURGERY SAME DAY [facility]   • LUMBAR FUSION POSTERIOR  2012    Performed by Lauryn French M.D. at SURGERY [facility]   • LUMBAR DECOMPRESSION  2012    Performed by Lauryn French M.D. at SURGERY [facility]   • LUMBAR LAMINECTOMY DISKECTOMY  11/10/2011    Performed by LAURYN FRENCH at SURGERY [facility]   • LUMBAR LAMINECTOMY DISKECTOMY  2011    Performed by RAVI MONTENEGRO at SURGERY [facility]   • GYN SURGERY      abnormal PAP smears   • PRIMARY C SECTION      tubal ligation, , fallopian tube (one jewels)       ROS  No chest pain, no shortness of breath, no abdominal pain, see HPI        Objective:     Blood pressure 110/74, pulse 60, temperature 36.4 °C (97.6 °F), resp. rate 14, height 1.803 m (5' 11\"), weight (!) 157.9 kg (348 lb), last menstrual period 2015, SpO2 96 %, not currently breastfeeding. Body mass index is 48.54 kg/m².   Physical Exam:  Constitutional: Alert, no distress.  Skin: Warm, dry, good turgor, no rashes in visible areas. Boil lesions as per above   Eye: Equal, round and reactive, conjunctiva clear, lids normal.  ENMT: Lips without lesions, poor dentition, oropharynx clear.  Neck: Trachea midline, no masses, no thyromegaly. No cervical or supraclavicular lymphadenopathy  Respiratory: Unlabored respiratory effort,  Psych: Alert and oriented x3, normal affect and mood.        Assessment and Plan:   The following treatment plan was discussed    1. Boil  I will switch to doxy. She will follow up in 2 weeks. If still persistent consider surgery consultation for I&D.   - " doxycycline (VIBRAMYCIN) 100 MG Tab; Take 1 Tab by mouth 2 times a day.  Dispense: 20 Tab; Refill: 0      Followup: Return in about 2 weeks (around 4/10/2018) for Short, with Laz Cesar

## 2018-04-12 ENCOUNTER — OFFICE VISIT (OUTPATIENT)
Dept: MEDICAL GROUP | Facility: PHYSICIAN GROUP | Age: 38
End: 2018-04-12
Payer: COMMERCIAL

## 2018-04-12 ENCOUNTER — TELEPHONE (OUTPATIENT)
Dept: MEDICAL GROUP | Facility: PHYSICIAN GROUP | Age: 38
End: 2018-04-12

## 2018-04-12 VITALS
TEMPERATURE: 97.9 F | BODY MASS INDEX: 41.02 KG/M2 | SYSTOLIC BLOOD PRESSURE: 122 MMHG | WEIGHT: 293 LBS | DIASTOLIC BLOOD PRESSURE: 70 MMHG | OXYGEN SATURATION: 95 % | RESPIRATION RATE: 20 BRPM | HEIGHT: 71 IN | HEART RATE: 84 BPM

## 2018-04-12 DIAGNOSIS — R30.0 DYSURIA: ICD-10-CM

## 2018-04-12 DIAGNOSIS — L02.92 BOIL: ICD-10-CM

## 2018-04-12 DIAGNOSIS — R06.2 WHEEZING: ICD-10-CM

## 2018-04-12 LAB
APPEARANCE UR: NORMAL
BILIRUB UR STRIP-MCNC: NORMAL MG/DL
COLOR UR AUTO: NORMAL
GLUCOSE UR STRIP.AUTO-MCNC: NORMAL MG/DL
KETONES UR STRIP.AUTO-MCNC: NORMAL MG/DL
LEUKOCYTE ESTERASE UR QL STRIP.AUTO: NORMAL
NITRITE UR QL STRIP.AUTO: NORMAL
PH UR STRIP.AUTO: 6 [PH] (ref 5–8)
PROT UR QL STRIP: NORMAL MG/DL
RBC UR QL AUTO: NORMAL
SP GR UR STRIP.AUTO: 1.03
UROBILINOGEN UR STRIP-MCNC: 1 MG/DL

## 2018-04-12 PROCEDURE — 81002 URINALYSIS NONAUTO W/O SCOPE: CPT | Performed by: NURSE PRACTITIONER

## 2018-04-12 PROCEDURE — 99214 OFFICE O/P EST MOD 30 MIN: CPT | Performed by: NURSE PRACTITIONER

## 2018-04-12 RX ORDER — ALBUTEROL SULFATE 90 UG/1
2 AEROSOL, METERED RESPIRATORY (INHALATION) EVERY 6 HOURS PRN
Qty: 8.5 G | Refills: 3 | Status: SHIPPED | OUTPATIENT
Start: 2018-04-12 | End: 2018-04-12

## 2018-04-12 RX ORDER — ALBUTEROL SULFATE 90 UG/1
2 AEROSOL, METERED RESPIRATORY (INHALATION) EVERY 6 HOURS PRN
Qty: 8.5 G | Refills: 3 | Status: SHIPPED | OUTPATIENT
Start: 2018-04-12 | End: 2018-05-07 | Stop reason: SDUPTHER

## 2018-04-12 ASSESSMENT — PAIN SCALES - GENERAL: PAINLEVEL: 10=SEVERE PAIN

## 2018-04-12 ASSESSMENT — PATIENT HEALTH QUESTIONNAIRE - PHQ9: CLINICAL INTERPRETATION OF PHQ2 SCORE: 0

## 2018-04-12 NOTE — TELEPHONE ENCOUNTER
VOICEMAIL  1. Caller Name: Cira Ferguson                        Call Back Number: [phone] (home)       2. Message: Called and left patient a message. Letter for work has been placed at the  for her to .LM     3. Patient approves office to leave a detailed voicemail/MyChart message: yes

## 2018-04-12 NOTE — LETTER
April 12, 2018        Cira Ferguson  [address]  [place] [address]        To Whom it May Concern:    Cira requires an extra 15 minutes a day of personal break time, Cira must able to use the bathroom frequently as needed.     If you have any questions or concerns, please don't hesitate to call.        Sincerely,        MELANY Hughes.P.RDEVAN.    Electronically Signed

## 2018-04-13 NOTE — PROGRESS NOTES
Chief Complaint   Patient presents with   • Other     broils    • Cough     x 1 month; green flem since antibiotic    • Urinary Frequency     letter for work        HISTORY OF PRESENT ILLNESS: Patient is a 37 y.o. female established patient who presents today to follow-up on boils.    Boil  Patient has an appointment with surgery to have this excised tomorrow. Overall it is healing well and that she has completed antibiotics. Patient did also see Dr. Hernandez 2 weeks ago for boils on her bilateral labia patient states that these have resolved completely and that she is doing well this time. Patient denies fever, chills, nausea, vomiting, pain or exudate from any of the healing lesions.      Patient Active Problem List    Diagnosis Date Noted   • Lumbar nerve root compression 11/05/2012     Priority: High   • Pain of left breast 02/02/2018   • Boil 02/02/2018   • History of DVT (deep vein thrombosis) 05/01/2017   • Morbid obesity with BMI of 45.0-49.9, adult ([facility]) 05/01/2017   • Schizoaffective disorder (CMS-HCC) 05/01/2017   • Incontinence in female 05/01/2017   • Pain associated with defecation 05/01/2017   • Numbness 03/07/2014   • Back pain 11/03/2012   • Gait disturbance 11/03/2012   • Weakness of right leg 11/03/2012   • Neurogenic pain 05/23/2011   • Childhood asthma 05/23/2011   • Polycystic ovary syndrome 05/23/2011       Allergies:Contrast media with iodine [iodine]; Dilaudid [hydromorphone hcl]; and Sulfa drugs    Current Outpatient Prescriptions   Medication Sig Dispense Refill   • albuterol 108 (90 Base) MCG/ACT Aero Soln inhalation aerosol Inhale 2 Puffs by mouth every 6 hours as needed for Shortness of Breath. 8.5 g 3   • doxycycline (VIBRAMYCIN) 100 MG Tab Take 1 Tab by mouth 2 times a day. 20 Tab 0   • gabapentin (NEURONTIN) 300 MG Cap Take 300 mg by mouth 3 times a day.     • haloperidol (HALDOL) 5 MG Tab Take 1 Tab by mouth every bedtime. 30 Tab 3   • topiramate (TOPAMAX) 50 MG tablet Take 1 Tab by  mouth 2 times a day. 60 Tab 3   • oxycodone immediate release (ROXICODONE) 10 MG immediate release tablet Take 10 mg by mouth 4 times a day.  0   • pregabalin (LYRICA) 100 MG Cap Take 100 mg by mouth 2 times a day.     • ibuprofen (MOTRIN) 800 MG Tab Take 800 mg by mouth every day.  0     No current facility-administered medications for this visit.        Social History   Substance Use Topics   • Smoking status: Current Every Day Smoker     Packs/day: 0.50     Years: 15.00     Types: Cigarettes   • Smokeless tobacco: Never Used   • Alcohol use No       Family Status   Relation Status   • Mother Alive   • Father Alive   • Sister Alive   • Brother Alive   • Son Alive   • Daughter Alive     Family History   Problem Relation Age of Onset   • Heart Disease Mother    • Diabetes Mother    • Lung Disease Mother    • Alcohol/Drug Father        Review of Systems:   Constitutional:  Negative for fever, chills, weight loss and malaise/fatigue.   HEENT:  Negative for ear pain, nosebleeds, congestion, sore throat and neck pain.    Eyes:  Negative for blurred vision.   Respiratory:  Negative for cough, sputum production, shortness of breath and wheezing.    Cardiovascular:  Negative for chest pain, palpitations, orthopnea and leg swelling.   Gastrointestinal:  Negative for heartburn, nausea, vomiting and abdominal pain.   Genitourinary:  Negative for dysuria, urgency and frequency.   Musculoskeletal: Positive for myalgias, back pain and joint pain.   Skin:  Negative for rash and itching.   Neurological:  Negative for dizziness, tingling, tremors, sensory change, focal weakness and headaches.   Endo/Heme/Allergies:  Does not bruise/bleed easily.   Psychiatric/Behavioral:  Negative for depression, suicidal ideas and memory loss.  The patient is not nervous/anxious and does not have insomnia.    All other systems reviewed and are negative except as in HPI.    Exam:  Blood pressure 122/70, pulse 84, temperature 36.6 °C (97.9 °F),  "resp. rate 20, height 1.803 m (5' 11\"), weight (!) 159.7 kg (352 lb), last menstrual period 05/01/2015, SpO2 95 %, not currently breastfeeding.  General:  Normal appearing. No distress.  Pulmonary:  Clear to ausculation.  Normal effort. No rales, ronchi, or wheezing.  Cardiovascular:  Regular rate and rhythm without murmur. Carotid and radial pulses are intact and equal bilaterally.  Neurologic:  Grossly nonfocal  Lymph:  No cervical, supraclavicular one left axillary lymph node is palpable patient will continue to monitor as infection in her breast resolves.   Skin:  Warm and dry. Labia are healed, as no evidence of infection or boils at this time. Skin of breast is peeling, slightly icteric and there is no redness, heat, pustules or exudate noted at this time.  Musculoskeletal:  Normal gait. No extremity cyanosis, clubbing, or edema.  Psych:  Normal mood and affect. Alert and oriented x3. Judgment and insight is normal.      PLAN:    1. Dysuria  Point of care urinalysis is negative at this time.  - POCT Urinalysis    2. Wheezing  Refills provided of inhaler.  - albuterol 108 (90 Base) MCG/ACT Aero Soln inhalation aerosol; Inhale 2 Puffs by mouth every 6 hours as needed for Shortness of Breath.  Dispense: 8.5 g; Refill: 3    3. Boil  Follow-up with surgeon for excision of breast abscess. Labial boils are resolved at this time.    Follow-up in one month. Patient is encouraged to be seen in the emergency room for chest pain, palpitations, shortness of breath, dizziness, severe abdominal pain or other concerning symptoms.    Please note that this dictation was created using voice recognition software. I have made every reasonable attempt to correct obvious errors, but I expect that there are errors of grammar and possibly content that I did not discover before finalizing the note.    Assessment/Plan:  1. Dysuria  POCT Urinalysis   2. Wheezing  albuterol 108 (90 Base) MCG/ACT Aero Soln inhalation aerosol    " DISCONTINUED: albuterol 108 (90 Base) MCG/ACT Aero Soln inhalation aerosol   3. Boil            I have placed the below orders and discussed them with an approved delegating provider. The MA is performing the below orders under the direction of Dr. Tai.

## 2018-04-13 NOTE — ASSESSMENT & PLAN NOTE
Patient has an appointment with surgery to have this excised tomorrow. Overall it is healing well and that she has completed antibiotics. Patient did also see Dr. Hernandez 2 weeks ago for boils on her bilateral labia patient states that these have resolved completely and that she is doing well this time. Patient denies fever, chills, nausea, vomiting, pain or exudate from any of the healing lesions.

## 2018-04-19 ENCOUNTER — TELEPHONE (OUTPATIENT)
Dept: MEDICAL GROUP | Facility: PHYSICIAN GROUP | Age: 38
End: 2018-04-19

## 2018-04-19 NOTE — LETTER
April 19, 2018        Cira Ferguson  [address]  [place] [address]        To Whom it May Concern:    Cira may return to work on April 21, 2018 without restrictions.    If you have any questions or concerns, please don't hesitate to call.        Sincerely,        MELANY Hughes.GISSELLE.    Electronically Signed

## 2018-04-20 ENCOUNTER — APPOINTMENT (OUTPATIENT)
Dept: MEDICAL GROUP | Facility: PHYSICIAN GROUP | Age: 38
End: 2018-04-20
Payer: COMMERCIAL

## 2018-04-20 ENCOUNTER — TELEPHONE (OUTPATIENT)
Dept: MEDICAL GROUP | Facility: PHYSICIAN GROUP | Age: 38
End: 2018-04-20

## 2018-04-20 NOTE — TELEPHONE ENCOUNTER
Called patient and discussed return to work. Patient states that her incision is healing well and that she saw Dr. Prince today who stated she was okay to go back to work without restriction. Patient had an appointment with Lanny Mann tomorrow to obtain return to work letter is with patient that I could write this for her and it would be waiting up front for her to pickup. Patient works at a [employer] center and answers phone throughout the day states that she does not do any heavy lifting and mostly sits.

## 2018-04-20 NOTE — TELEPHONE ENCOUNTER
Phone Number Called: [phone] (home)       Message: Called and spoke to patient. Let her know the letter has been placed up front for . LM     Left Message for patient to call back: no

## 2018-04-24 ENCOUNTER — OFFICE VISIT (OUTPATIENT)
Dept: MEDICAL GROUP | Facility: CLINIC | Age: 38
End: 2018-04-24
Payer: COMMERCIAL

## 2018-04-24 VITALS
BODY MASS INDEX: 41.02 KG/M2 | RESPIRATION RATE: 16 BRPM | WEIGHT: 293 LBS | OXYGEN SATURATION: 98 % | SYSTOLIC BLOOD PRESSURE: 128 MMHG | HEIGHT: 71 IN | DIASTOLIC BLOOD PRESSURE: 76 MMHG | HEART RATE: 80 BPM | TEMPERATURE: 97.5 F

## 2018-04-24 DIAGNOSIS — S21.002A WOUND OF LEFT BREAST, INITIAL ENCOUNTER: ICD-10-CM

## 2018-04-24 PROCEDURE — 99213 OFFICE O/P EST LOW 20 MIN: CPT | Performed by: FAMILY MEDICINE

## 2018-04-24 NOTE — PROGRESS NOTES
CC: Wants to know if she can be checked for MRSA    HPI:   Cira is a 37-year-old female who presents today with the following.    She had a boil type lesion that was recurring under her left breast. About 10 days ago she got it excised by Dr. Phoenix. She has since seen Dr. Prince for a wound check and noted that it was healing well. She gives a history of recurrent boils in different areas of her body. Dr. Prince placed a referral to Infectious Disease and she is waiting for that appointment.  Her PCP wrote her a letter that she could return to work without restrictions. However, a coworker has basically accused the patient of giving her MRSA and she is now told that she must get tested for MRSA before she returns to work.  The patient currently feels fine and has no active skin lesions.      Patient Active Problem List    Diagnosis Date Noted   • Lumbar nerve root compression 11/05/2012     Priority: High   • Pain of left breast 02/02/2018   • Boil 02/02/2018   • History of DVT (deep vein thrombosis) 05/01/2017   • Morbid obesity with BMI of 45.0-49.9, adult ([facility]) 05/01/2017   • Schizoaffective disorder (CMS-HCC) 05/01/2017   • Incontinence in female 05/01/2017   • Pain associated with defecation 05/01/2017   • Numbness 03/07/2014   • Back pain 11/03/2012   • Gait disturbance 11/03/2012   • Weakness of right leg 11/03/2012   • Neurogenic pain 05/23/2011   • Childhood asthma 05/23/2011   • Polycystic ovary syndrome 05/23/2011       Current Outpatient Prescriptions   Medication Sig Dispense Refill   • gabapentin (NEURONTIN) 300 MG Cap Take 300 mg by mouth 3 times a day.     • haloperidol (HALDOL) 5 MG Tab Take 1 Tab by mouth every bedtime. 30 Tab 3   • topiramate (TOPAMAX) 50 MG tablet Take 1 Tab by mouth 2 times a day. 60 Tab 3   • oxycodone immediate release (ROXICODONE) 10 MG immediate release tablet Take 10 mg by mouth 4 times a day.  0   • albuterol 108 (90 Base) MCG/ACT Aero Soln inhalation aerosol  "Inhale 2 Puffs by mouth every 6 hours as needed for Shortness of Breath. 8.5 g 3   • doxycycline (VIBRAMYCIN) 100 MG Tab Take 1 Tab by mouth 2 times a day. 20 Tab 0   • pregabalin (LYRICA) 100 MG Cap Take 100 mg by mouth 2 times a day.     • ibuprofen (MOTRIN) 800 MG Tab Take 800 mg by mouth every day.  0     No current facility-administered medications for this visit.          Allergies as of 04/24/2018 - Reviewed 04/24/2018   Allergen Reaction Noted   • Contrast media with iodine [iodine]  05/17/2011   • Dilaudid [hydromorphone hcl] Hives 09/18/2011   • Sulfa drugs  03/27/2018        Vital Signs:    /76   Pulse 80   Temp 36.4 °C (97.5 °F)   Resp 16   Ht 1.803 m (5' 10.98\")   Wt (!) 155.6 kg (343 lb)   LMP 05/01/2015   SpO2 98%   BMI 47.87 kg/m²     Physical Exam:  Gen:         Alert and oriented, No apparent distress.  Chest:      Wound underneath the left breast that appears almost completely healed. It is clean and dry. There is no tenderness to palpation.  Skin:  No suspicious skin lesions or masses.      Assessment and Plan.   37 y.o. female with the following issues.    1. Wound of left breast, initial encounter  This has healed well and there is no evidence of infection. During the visit she called Dr. Phoenix's office and got the pathology results which showed a cyst. There was no wound culture done and she was on antibiotics at the time of excision. She says she has been on multiple courses of doxycycline and also Bactrim.  Currently there is no active wound to test for MRSA. I will leave it to ID to see if other testing is warranted. I have written another letter for the patient to present to work stating that she currently has no indication of infection and can return to work without restrictions.        "

## 2018-04-24 NOTE — LETTER
April 24, 2018       Patient: Cira Ferguson   YOB: 1980   Date of Visit: 4/24/2018         To Whom It May Concern:    It is my medical opinion that Cira Ferguson has no current indication of infection and may return to full duty, no restrictions.    If you have any questions or concerns, please don't hesitate to call [phone]          Sincerely,          Thania Flores M.D.  Electronically Signed

## 2018-04-27 ENCOUNTER — APPOINTMENT (OUTPATIENT)
Dept: RADIOLOGY | Facility: MEDICAL CENTER | Age: 38
End: 2018-04-27
Attending: SURGERY
Payer: COMMERCIAL

## 2018-05-07 ENCOUNTER — OFFICE VISIT (OUTPATIENT)
Dept: MEDICAL GROUP | Facility: CLINIC | Age: 38
End: 2018-05-07
Payer: COMMERCIAL

## 2018-05-07 ENCOUNTER — OFFICE VISIT (OUTPATIENT)
Dept: INFECTIOUS DISEASES | Facility: MEDICAL CENTER | Age: 38
End: 2018-05-07
Payer: COMMERCIAL

## 2018-05-07 VITALS
BODY MASS INDEX: 41.02 KG/M2 | TEMPERATURE: 97.9 F | WEIGHT: 293 LBS | SYSTOLIC BLOOD PRESSURE: 110 MMHG | HEIGHT: 71 IN | HEART RATE: 76 BPM | DIASTOLIC BLOOD PRESSURE: 68 MMHG | OXYGEN SATURATION: 92 %

## 2018-05-07 VITALS
RESPIRATION RATE: 16 BRPM | TEMPERATURE: 96.4 F | HEIGHT: 71 IN | DIASTOLIC BLOOD PRESSURE: 68 MMHG | WEIGHT: 293 LBS | HEART RATE: 72 BPM | SYSTOLIC BLOOD PRESSURE: 108 MMHG | BODY MASS INDEX: 41.02 KG/M2 | OXYGEN SATURATION: 95 %

## 2018-05-07 DIAGNOSIS — R06.2 WHEEZING: ICD-10-CM

## 2018-05-07 DIAGNOSIS — L08.9: ICD-10-CM

## 2018-05-07 DIAGNOSIS — J02.9 SORE THROAT: ICD-10-CM

## 2018-05-07 DIAGNOSIS — J40 BRONCHITIS: ICD-10-CM

## 2018-05-07 DIAGNOSIS — R49.0 HOARSENESS: ICD-10-CM

## 2018-05-07 DIAGNOSIS — E66.9 OBESITY DUE TO ENERGY IMBALANCE: ICD-10-CM

## 2018-05-07 LAB
INT CON NEG: NEGATIVE
INT CON POS: POSITIVE
S PYO AG THROAT QL: NORMAL

## 2018-05-07 PROCEDURE — 87880 STREP A ASSAY W/OPTIC: CPT | Performed by: NURSE PRACTITIONER

## 2018-05-07 PROCEDURE — 99213 OFFICE O/P EST LOW 20 MIN: CPT | Performed by: NURSE PRACTITIONER

## 2018-05-07 PROCEDURE — 99212 OFFICE O/P EST SF 10 MIN: CPT | Performed by: INTERNAL MEDICINE

## 2018-05-07 RX ORDER — ALBUTEROL SULFATE 90 UG/1
2 AEROSOL, METERED RESPIRATORY (INHALATION) EVERY 6 HOURS PRN
Qty: 8.5 G | Refills: 0 | Status: SHIPPED | OUTPATIENT
Start: 2018-05-07 | End: 2019-02-07 | Stop reason: CLARIF

## 2018-05-07 RX ORDER — AZITHROMYCIN 250 MG/1
TABLET, FILM COATED ORAL
Qty: 6 TAB | Refills: 0 | Status: SHIPPED | OUTPATIENT
Start: 2018-05-07 | End: 2018-05-22

## 2018-05-07 RX ORDER — ALBUTEROL SULFATE 90 UG/1
2 AEROSOL, METERED RESPIRATORY (INHALATION) EVERY 6 HOURS PRN
Qty: 8.5 G | Refills: 0 | Status: SHIPPED | OUTPATIENT
Start: 2018-05-07 | End: 2018-05-07 | Stop reason: SDUPTHER

## 2018-05-07 ASSESSMENT — ENCOUNTER SYMPTOMS
WHEEZING: 0
SORE THROAT: 1
SHORTNESS OF BREATH: 1
SPUTUM PRODUCTION: 0
SINUS PAIN: 1
FEVER: 0
COUGH: 1
CHILLS: 0

## 2018-05-07 NOTE — LETTER
May 7, 2018    To Whom It May Concern:         This is confirmation that Cira Ferguson attended her scheduled appointment with BARBARA Soliman on 5/07/18. She needs day off from 5/6 to 5/8.          If you have any questions please do not hesitate to call me at the phone number listed below.    Sincerely,          MARÍA Soliman.  [phone]

## 2018-05-07 NOTE — PROGRESS NOTES
Chief Complaint   Patient presents with   • URI     Non productive cough/ sinus pressure/ horseness/wheezing  x 2 days        HISTORY OF PRESENT ILLNESS: Patient is a 37 y.o. female established patient who presents today due to 2 days hx of worsening sore throat, coughing which has caused her losing voice, sinus pressure, ear plugged and itchiness. She denied fever but chills. She worked at [employer] and her symptoms have made her unable to go to work yesterday because she is losing her voice. She denied wheezing but getting somewhat sob. Her sore throat is worsening in the last two days.    She use chloroseptic spray for her sore throat with no help at all.    She reports she was given albuterol last time when she has URI but the copay is over $100 so that she did not pick that up.     She requests work note to excuse her from work from 5/6-5/8.    Patient Active Problem List    Diagnosis Date Noted   • Lumbar nerve root compression 11/05/2012     Priority: High   • Pain of left breast 02/02/2018   • Boil 02/02/2018   • History of DVT (deep vein thrombosis) 05/01/2017   • Morbid obesity with BMI of 45.0-49.9, adult ([facility]) 05/01/2017   • Schizoaffective disorder ([facility]) 05/01/2017   • Incontinence in female 05/01/2017   • Pain associated with defecation 05/01/2017   • Numbness 03/07/2014   • Back pain 11/03/2012   • Gait disturbance 11/03/2012   • Weakness of right leg 11/03/2012   • Neurogenic pain 05/23/2011   • Childhood asthma 05/23/2011   • Polycystic ovary syndrome 05/23/2011       Allergies:Contrast media with iodine [iodine]; Dilaudid [hydromorphone hcl]; and Sulfa drugs    Current Outpatient Prescriptions   Medication Sig Dispense Refill   • albuterol 108 (90 Base) MCG/ACT Aero Soln inhalation aerosol Inhale 2 Puffs by mouth every 6 hours as needed for Shortness of Breath. 8.5 g 0   • azithromycin (ZITHROMAX) 250 MG Tab As directed 6 Tab 0   • gabapentin (NEURONTIN) 300 MG Cap Take 300 mg by mouth 3 times a  "day.     • haloperidol (HALDOL) 5 MG Tab Take 1 Tab by mouth every bedtime. 30 Tab 3   • topiramate (TOPAMAX) 50 MG tablet Take 1 Tab by mouth 2 times a day. 60 Tab 3   • ibuprofen (MOTRIN) 800 MG Tab Take 800 mg by mouth every day.  0   • oxycodone immediate release (ROXICODONE) 10 MG immediate release tablet Take 10 mg by mouth 4 times a day.  0   • pregabalin (LYRICA) 100 MG Cap Take 100 mg by mouth 2 times a day.       No current facility-administered medications for this visit.        Social History   Substance Use Topics   • Smoking status: Current Every Day Smoker     Packs/day: 0.50     Years: 15.00     Types: Cigarettes   • Smokeless tobacco: Never Used   • Alcohol use No       Family Status   Relation Status   • Mother Alive   • Father Alive   • Sister Alive   • Brother Alive   • Son Alive   • Daughter Alive     Family History   Problem Relation Age of Onset   • Heart Disease Mother    • Diabetes Mother    • Lung Disease Mother    • Alcohol/Drug Father          ROS:  Review of Systems   Constitutional: Negative for chills and fever.   HENT: Positive for congestion, ear pain, sinus pain and sore throat.    Respiratory: Positive for cough and shortness of breath. Negative for sputum production and wheezing.         Objective:     Blood pressure 108/68, pulse 72, temperature (!) 35.8 °C (96.4 °F), resp. rate 16, height 1.803 m (5' 11\"), weight (!) 159.7 kg (352 lb), last menstrual period 05/01/2015, SpO2 95 %.     Physical Exam:  Physical Exam   Constitutional: She is oriented to person, place, and time and well-developed, well-nourished, and in no distress.   HENT:   Head: Normocephalic and atraumatic.   Right Ear: No swelling or tenderness. Tympanic membrane is injected. Tympanic membrane is not bulging. A middle ear effusion is present.   Left Ear: Hearing, tympanic membrane and external ear normal.   Nose: Right sinus exhibits no maxillary sinus tenderness and no frontal sinus tenderness. Left sinus " exhibits frontal sinus tenderness. Left sinus exhibits no maxillary sinus tenderness.   Mouth/Throat: Posterior oropharyngeal erythema present. No oropharyngeal exudate, posterior oropharyngeal edema or tonsillar abscesses ( but small white patche noted at her left tosil area).   Eyes: Conjunctivae are normal.   Neck: Neck supple. No JVD present.   Cardiovascular: Normal rate and regular rhythm.    Pulmonary/Chest: Effort normal. No respiratory distress. She has wheezes.   Musculoskeletal: She exhibits no edema.   Neurological: She is alert and oriented to person, place, and time.   Skin: Skin is warm. No erythema.   Vitals reviewed.        Assessment/Plan:  1. Wheezing  - albuterol 108 (90 Base) MCG/ACT Aero Soln inhalation aerosol; Inhale 2 Puffs by mouth every 6 hours as needed for Shortness of Breath.  Dispense: 8.5 g; Refill: 0    2. Bronchitis  - albuterol 108 (90 Base) MCG/ACT Aero Soln inhalation aerosol; Inhale 2 Puffs by mouth every 6 hours as needed for Shortness of Breath.  Dispense: 8.5 g; Refill: 0 --> pt is instructed to  the sample at [facility] center.   - azithromycin (ZITHROMAX) 250 MG Tab; As directed  Dispense: 6 Tab; Refill: 0    3. Hoarseness  - POCT Rapid Strep A: negative    4. Sore throat  - POCT Rapid Strep A: negative    Differential diagnoses and indications for immediate follow-up discussed with patient.    Instructed to return to clinic or nearest emergency department for any change in condition, further concerns, or worsening of symptoms.    The patient demonstrated a good understanding and agreed with the treatment plan.

## 2018-05-07 NOTE — PROGRESS NOTES
DATE OF SERVICE:  5/7/2018     REFERRING PHYSICIAN:  [email or web address]     REASON FOR CONSULTATION: Skin and Soft Tissue Infection     HISTORY OF PRESENT ILLNESS:  Patient is a 37 y.o. female who has a past medical history of borderline DM, cervical cancer has been getting repeated boils. They have been off and on. She does not currently have any. She has gotten them I&Ded. She says she saw Dr. Prince. She had an abscess underneath the breast I&D. She does have family history of breast cancer. She is worried because she is getting these repeated infections. There were no cultures available for me to   Assessed. Today she is denying an active boil. Breasts she does complain of some cough and URI symptoms.  REVIEW OF SYSTEMS:    Constitutional: Negative for fever and malaise/fatigue.   HENT: Negative for hearing loss and visual changes . [place] of some congestion  Eyes: Negative for blurred vision, double vision and photophobia.   Respiratory: Positive for cough  Cardiovascular: Negative for chest pain and leg swelling.   Gastrointestinal: Negative for nausea, vomiting and diarrhea.   Musculoskeletal: Negative for myalgias and back pain.   Skin: Negative for rash.   Neurological: Negative for sensory change, focal weakness and headaches.     ALLERGIES:    Allergies   Allergen Reactions   • Contrast Media With Iodine [Iodine]    • Dilaudid [Hydromorphone Hcl] Hives   • Sulfa Drugs         PAST MEDICAL HISTORY:   Past Medical History:   Diagnosis Date   • Bipolar disorder (HCC)    • Cervical cancer (HCC)    • DVT (deep venous thrombosis) (HCC) 2013    leg January 2013   • Fall 2010   • Hepatitis C    • Infectious disease     Hep C   • IV drug abuse     Quit 2012 per pt   • PE (pulmonary thromboembolism) (HCC) 2013   • Psychiatric disorder     bipolar   • Schizoaffective disorder (HCC)    • Schizophrenia (HCC)        PAST SURGICAL HISTORY:    Past Surgical History:   Procedure Laterality Date   •  VAGINAL HYSTERECTOMY SCOPE TOTAL  10/23/2014    Performed by Levar Trevino M.D. at SURGERY SAME DAY [facility]   • SALPINGECTOMY  10/23/2014    Performed by Levar Trevino M.D. at SURGERY SAME DAY [facility]   • ANTERIOR AND POSTERIOR REPAIR  10/23/2014    Performed by Levar Trevino M.D. at SURGERY SAME DAY [facility]   • ENTEROCELE REPAIR  10/23/2014    Performed by Levar Trevino M.D. at SURGERY SAME DAY [facility]   • VAGINAL SUSPENSION  10/23/2014    Performed by Levar Trevino M.D. at SURGERY SAME DAY [facility]   • BLADDER SLING FEMALE  10/23/2014    Performed by Levar Trevino M.D. at SURGERY SAME DAY [facility]   • CYSTOSCOPY  10/23/2014    Performed by Levar Trevino M.D. at SURGERY SAME DAY [facility]   • LUMBAR FUSION POSTERIOR  2012    Performed by Lauryn French M.D. at SURGERY [facility]   • LUMBAR DECOMPRESSION  2012    Performed by Lauryn French M.D. at SURGERY [facility]   • LUMBAR LAMINECTOMY DISKECTOMY  11/10/2011    Performed by LAURYN FRENCH at SURGERY [facility]   • LUMBAR LAMINECTOMY DISKECTOMY  2011    Performed by RAVI MONTENEGRO at SURGERY [facility]   • GYN SURGERY      abnormal PAP smears   • PRIMARY C SECTION      tubal ligation, , fallopian tube (one jewels)       FAMILY HISTORY:    Family History   Problem Relation Age of Onset   • Heart Disease Mother    • Diabetes Mother    • Lung Disease Mother    • Alcohol/Drug Father         SOCIAL HISTORY:    Social History     Social History   • Marital status:      Spouse name: N/A   • Number of children: N/A   • Years of education: N/A     Occupational History   • Not on file.     Social History Main Topics   • Smoking status: Current Every Day Smoker     Packs/day: 0.50     Years: 15.00     Types: Cigarettes   • Smokeless tobacco: Never Used   • Alcohol use No   • Drug use: No   • Sexual activity: Yes     Partners: Male     Other Topics Concern   • Not on file  "    Social History Narrative   • No narrative on file        MEDICATIONS:   Current Outpatient Prescriptions   Medication Sig Dispense Refill   • azithromycin (ZITHROMAX) 250 MG Tab As directed 6 Tab 0   • albuterol 108 (90 Base) MCG/ACT Aero Soln inhalation aerosol Inhale 2 Puffs by mouth every 6 hours as needed for Shortness of Breath. 8.5 g 0   • gabapentin (NEURONTIN) 300 MG Cap Take 300 mg by mouth 3 times a day.     • haloperidol (HALDOL) 5 MG Tab Take 1 Tab by mouth every bedtime. 30 Tab 3   • topiramate (TOPAMAX) 50 MG tablet Take 1 Tab by mouth 2 times a day. 60 Tab 3   • pregabalin (LYRICA) 100 MG Cap Take 100 mg by mouth 2 times a day.     • ibuprofen (MOTRIN) 800 MG Tab Take 800 mg by mouth every day.  0   • oxycodone immediate release (ROXICODONE) 10 MG immediate release tablet Take 10 mg by mouth 4 times a day.  0     No current facility-administered medications for this visit.         LABORATORY DATA:  Not available     PHYSICAL EXAMINATION:PE:      /68   Pulse 76   Temp 36.6 °C (97.9 °F)   Ht 1.803 m (5' 11\")   Wt (!) 158.8 kg (350 lb)   LMP 05/01/2015   SpO2 92%   BMI 48.82 kg/m²        Constitutional: Patient is oriented to person, place, and time.  she isoverweight  Eyes: Conjunctivae normal and EOM are normal. Pupils are equal, round, and reactive to light.   Mouth/Throat: Lips without lesions, good dentition, oropharynx is clear and moist.  Neck: Trachea midline. Normal range of motion. Neck supple. No masses  Cardiovascular: Normal rate, regular rhythm, normal heart sounds and intact distal pulses. No murmur, gallop, or friction rub. No edema.  Pulmonary/Chest: No respiratory distress. Unlabored respiratory effort, lungs clear to auscultation. No wheezes or rales.   Abdominal: Soft, non tender. BS + x 4. Tenderness  Musculoskeletal: Normal range of motion. No tenderness, swelling, erythema, deformity noted.  Neurological:  she is alert and oriented to person, place, and time. No " cranial nerve deficit. Coordination normal.   Skin: Skin is warm and dry. Good turgor. Scars from her I&D sites.  Psychiatric:  she has a normal mood and affect.  her behavior is normal.        ASSESSMENT:  1. Skin disease, infectious     2. Obesity due to energy imbalance     3. Tobacco abuse   4. URI  RECOMMENDATIONS:      At this time there is no active skin and soft tissue infection. She was counseled about recurrent SSTI. I do not recommend any suppressive antibiotics. She was counseled about infection control.  Patient is a smoker. She also has family history of breast cancer. She was counseled about the effect of smoking and repeated SSTI. She was also counseled about continuing to get preventive care for breast cancer.  She was advised that she insists next time her boil is I&D to get a culture be sent.  She will follow-up with us as needed  As far as URI I do not recommend any antibiotics since this is likely viral.

## 2018-05-07 NOTE — PATIENT INSTRUCTIONS

## 2018-05-10 ENCOUNTER — OFFICE VISIT (OUTPATIENT)
Dept: MEDICAL GROUP | Facility: PHYSICIAN GROUP | Age: 38
End: 2018-05-10
Payer: COMMERCIAL

## 2018-05-10 VITALS
HEART RATE: 84 BPM | WEIGHT: 293 LBS | OXYGEN SATURATION: 96 % | BODY MASS INDEX: 41.02 KG/M2 | HEIGHT: 71 IN | RESPIRATION RATE: 16 BRPM | DIASTOLIC BLOOD PRESSURE: 82 MMHG | SYSTOLIC BLOOD PRESSURE: 120 MMHG | TEMPERATURE: 97.8 F

## 2018-05-10 DIAGNOSIS — L02.92 BOIL: ICD-10-CM

## 2018-05-10 PROBLEM — N64.4 PAIN OF LEFT BREAST: Status: RESOLVED | Noted: 2018-02-02 | Resolved: 2018-05-10

## 2018-05-10 PROCEDURE — 99214 OFFICE O/P EST MOD 30 MIN: CPT | Performed by: NURSE PRACTITIONER

## 2018-05-10 ASSESSMENT — PAIN SCALES - GENERAL: PAINLEVEL: NO PAIN

## 2018-05-10 NOTE — PROGRESS NOTES
"Chief Complaint   Patient presents with   • Other     broils follow up        HISTORY OF PRESENT ILLNESS: Patient is a 37 y.o. female established patient who presents today to discuss MRSA.    Boil  Chronic in nature. Breast abscess was surgically removed. Patient denies any open wounds, states she does notice some pain at superior right gluteal cleft, states she is concerned she is getting another boil. States she does not think it is visible at this time. Patient states that she was seen by Dr. Garland who stated that she likely has MRSA, patient states her employer is requesting she be \"cleared\" of MRSA before returns to work. Patient is not currently on antibiotics. Patient denies fevers, chills, other signs of active infection. 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      Patient Active Problem List    Diagnosis Date Noted   • Lumbar nerve root compression 11/05/2012     Priority: High   • Boil 02/02/2018   • History of DVT (deep vein thrombosis) 05/01/2017   • Morbid obesity with BMI of 45.0-49.9, adult ([facility]) 05/01/2017   • Schizoaffective disorder ([facility]) 05/01/2017   • Incontinence in female 05/01/2017   • Pain associated with defecation 05/01/2017   • Numbness 03/07/2014   • Back pain 11/03/2012   • Gait disturbance 11/03/2012   • Weakness of right leg 11/03/2012   • Neurogenic pain 05/23/2011   • Childhood asthma 05/23/2011   • Polycystic ovary syndrome 05/23/2011       Allergies:Contrast media with iodine [iodine]; Dilaudid [hydromorphone hcl]; and Sulfa drugs    Current Outpatient Prescriptions   Medication Sig Dispense Refill   • pregabalin (LYRICA) 100 MG Cap Take 100 mg by mouth 2 times a day.     • albuterol 108 (90 Base) MCG/ACT Aero Soln inhalation aerosol Inhale 2 Puffs by mouth every 6 hours as needed for Shortness of Breath. 8.5 g 0   • azithromycin (ZITHROMAX) 250 MG Tab As directed 6 Tab 0   • gabapentin (NEURONTIN) 300 MG Cap Take 300 mg by mouth 3 times a day.     • " haloperidol (HALDOL) 5 MG Tab Take 1 Tab by mouth every bedtime. 30 Tab 3   • topiramate (TOPAMAX) 50 MG tablet Take 1 Tab by mouth 2 times a day. 60 Tab 3   • ibuprofen (MOTRIN) 800 MG Tab Take 800 mg by mouth every day.  0   • oxycodone immediate release (ROXICODONE) 10 MG immediate release tablet Take 10 mg by mouth 4 times a day.  0     No current facility-administered medications for this visit.        Social History   Substance Use Topics   • Smoking status: Current Every Day Smoker     Packs/day: 0.50     Years: 15.00     Types: Cigarettes   • Smokeless tobacco: Never Used   • Alcohol use No       Family Status   Relation Status   • Mother Alive   • Father Alive   • Sister Alive   • Brother Alive   • Son Alive   • Daughter Alive     Family History   Problem Relation Age of Onset   • Heart Disease Mother    • Diabetes Mother    • Lung Disease Mother    • Alcohol/Drug Father        Review of Systems:   Constitutional:  Negative for fever, chills, weight loss and malaise/fatigue.   HEENT:  Negative for ear pain, nosebleeds, congestion, sore throat and neck pain.    Eyes:  Negative for blurred vision.   Respiratory:  Negative for cough, sputum production, shortness of breath and wheezing.    Cardiovascular:  Negative for chest pain, palpitations, orthopnea and leg swelling.   Gastrointestinal:  Negative for heartburn, nausea, vomiting and abdominal pain.   Genitourinary:  Negative for dysuria, urgency and frequency.   Musculoskeletal:  Negative for myalgias, back pain and joint pain.   Skin:  Negative for rash and itching.   Neurological:  Negative for dizziness, tingling, tremors, sensory change, focal weakness and headaches.   Endo/Heme/Allergies:  Does not bruise/bleed easily.   Psychiatric/Behavioral:  Negative for depression, suicidal ideas and memory loss.  The patient is not nervous/anxious and does not have insomnia.    All other systems reviewed and are negative except as in HPI.    Exam:  Blood pressure  "120/82, pulse 84, temperature 36.6 °C (97.8 °F), resp. rate 16, height 1.803 m (5' 11\"), weight (!) 156.5 kg (345 lb), last menstrual period 05/01/2015, SpO2 96 %, not currently breastfeeding.  General:  Normal appearing. No distress.  HEENT:  Normocephalic. Eyes conjunctiva clear lids without ptosis, pupils equal and reactive to light accommodation, ears normal shape and contour, canals are clear bilaterally, tympanic membranes are benign, nasal mucosa benign, oropharynx is without erythema, edema or exudates.   Neck:  Supple without JVD or bruit. Thyroid is not enlarged.  Pulmonary:  Clear to ausculation.  Normal effort. No rales, ronchi, or wheezing.  Cardiovascular:  Regular rate and rhythm without murmur. Carotid and radial pulses are intact and equal bilaterally.  Neurologic:  Grossly nonfocal  Lymph:  No cervical, supraclavicular or axillary lymph nodes are palpable  Skin:  Warm and dry. Visual inspection of the breast indicates healed scar, visual inspection of gluteal cleft does not yield any signs or symptoms of infection there is no redness, swelling in the area patient indicates discomfort.   Musculoskeletal:  Normal gait. No extremity cyanosis, clubbing, or edema.  Psych:  Normal mood and affect. Alert and oriented x3. Judgment and insight is normal.      PLAN:    1. Boil   patient regarding MRSA infection, patient information about MRSA from up to date is provided at this time. Discussed with patient that MRSA is spread by contact, discussed the recommendation of keeping any open wounds covered, discussed frequent handwashing. Extensive discussion of MRSA infection, ways to contract MRSA.   Discussed with patient that I would contact Dr. Garland regarding employer's request for clearance of the infection as she has not been tested for MRSA. Patient states she is following Dr. Garland's instructions for bathing.      Patient was seen for 25 minutes face to face of which, greater than 50% was spent " counseling regarding the above mentioned problems.    Please note that this dictation was created using voice recognition software. I have made every reasonable attempt to correct obvious errors, but I expect that there are errors of grammar and possibly content that I did not discover before finalizing the note.    Assessment/Plan:  1. Boil            I have placed the below orders and discussed them with an approved delegating provider. The MA is performing the below orders under the direction of Dr. Tai.

## 2018-05-10 NOTE — ASSESSMENT & PLAN NOTE
"Chronic in nature. Breast abscess was surgically removed. Patient denies any open wounds, states she does notice some pain at superior right gluteal cleft, states she is concerned she is getting another boil. States she does not think it is visible at this time. Patient states that she was seen by Dr. Garland who stated that she likely has MRSA, patient states her employer is requesting she be \"cleared\" of MRSA before returns to work. Patient is not currently on antibiotics. Patient denies fevers, chills, other signs of active infection. 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  "

## 2018-05-11 ENCOUNTER — TELEPHONE (OUTPATIENT)
Dept: MEDICAL GROUP | Facility: PHYSICIAN GROUP | Age: 38
End: 2018-05-11

## 2018-05-11 NOTE — TELEPHONE ENCOUNTER
1. Caller Name: Cira                                     Call Back Number: [phone] (home)       Patient approves a detailed voicemail message: yes    Patient called stating she saw you on 5/10/18 and you were to speak with her infectious disease provider and write a return to work letter for her.  She is asking for a call back once this has been done.

## 2018-05-11 NOTE — TELEPHONE ENCOUNTER
Phone Number Called: [phone] (home)     Message: LVM informing patient, aware we will reach out to her once we have heard back from infectious disease.     Left Message for patient to call back:  no

## 2018-05-15 PROBLEM — L08.9: Status: ACTIVE | Noted: 2018-05-15

## 2018-05-15 NOTE — TELEPHONE ENCOUNTER
Laz, patient LVM this AM again asking if you have talked to her infectious disease doctor about returning to work.

## 2018-05-16 ENCOUNTER — PATIENT MESSAGE (OUTPATIENT)
Dept: INFECTIOUS DISEASES | Facility: MEDICAL CENTER | Age: 38
End: 2018-05-16

## 2018-05-16 NOTE — TELEPHONE ENCOUNTER
From: Cira Ferguson  To: Maria Elena Carmona M.D.  Sent: 5/16/2018 4:10 PM PDT  Subject: Non-Urgent Medical Question    I was to come to the office on 5/16 to get a return to work note. When I got to the office they were closed. Can I come tomorrow

## 2018-05-22 ENCOUNTER — HOSPITAL ENCOUNTER (OUTPATIENT)
Facility: MEDICAL CENTER | Age: 38
End: 2018-05-24
Attending: EMERGENCY MEDICINE | Admitting: INTERNAL MEDICINE
Payer: COMMERCIAL

## 2018-05-22 DIAGNOSIS — R45.851 SUICIDAL IDEATION: ICD-10-CM

## 2018-05-22 DIAGNOSIS — F43.21 SITUATIONAL DEPRESSION: ICD-10-CM

## 2018-05-22 LAB
AMPHET UR QL SCN: POSITIVE
BARBITURATES UR QL SCN: NEGATIVE
BENZODIAZ UR QL SCN: NEGATIVE
BZE UR QL SCN: NEGATIVE
CANNABINOIDS UR QL SCN: NEGATIVE
HCG UR QL: NEGATIVE
METHADONE UR QL SCN: NEGATIVE
OPIATES UR QL SCN: POSITIVE
OXYCODONE UR QL SCN: POSITIVE
PCP UR QL SCN: NEGATIVE
POC BREATHALIZER: 0 PERCENT (ref 0–0.01)
PROPOXYPH UR QL SCN: NEGATIVE
SP GR UR REFRACTOMETRY: 1.03

## 2018-05-22 PROCEDURE — 99285 EMERGENCY DEPT VISIT HI MDM: CPT

## 2018-05-22 PROCEDURE — 90791 PSYCH DIAGNOSTIC EVALUATION: CPT

## 2018-05-22 PROCEDURE — A9270 NON-COVERED ITEM OR SERVICE: HCPCS | Performed by: EMERGENCY MEDICINE

## 2018-05-22 PROCEDURE — 302970 POC BREATHALIZER: Performed by: EMERGENCY MEDICINE

## 2018-05-22 PROCEDURE — 700102 HCHG RX REV CODE 250 W/ 637 OVERRIDE(OP): Performed by: EMERGENCY MEDICINE

## 2018-05-22 PROCEDURE — 81025 URINE PREGNANCY TEST: CPT

## 2018-05-22 PROCEDURE — 80307 DRUG TEST PRSMV CHEM ANLYZR: CPT

## 2018-05-22 RX ORDER — LORAZEPAM 1 MG/1
1 TABLET ORAL ONCE
Status: COMPLETED | OUTPATIENT
Start: 2018-05-22 | End: 2018-05-22

## 2018-05-22 RX ORDER — OXYCODONE HYDROCHLORIDE AND ACETAMINOPHEN 5; 325 MG/1; MG/1
1 TABLET ORAL EVERY 8 HOURS PRN
Status: DISCONTINUED | OUTPATIENT
Start: 2018-05-22 | End: 2018-05-23

## 2018-05-22 RX ORDER — IBUPROFEN 600 MG/1
600 TABLET ORAL ONCE
Status: COMPLETED | OUTPATIENT
Start: 2018-05-22 | End: 2018-05-22

## 2018-05-22 RX ORDER — OXYCODONE HCL 10 MG/1
10 TABLET, FILM COATED, EXTENDED RELEASE ORAL ONCE
Status: COMPLETED | OUTPATIENT
Start: 2018-05-22 | End: 2018-05-22

## 2018-05-22 RX ADMIN — LORAZEPAM 1 MG: 1 TABLET ORAL at 13:35

## 2018-05-22 RX ADMIN — OXYCODONE HYDROCHLORIDE AND ACETAMINOPHEN 1 TABLET: 5; 325 TABLET ORAL at 21:39

## 2018-05-22 RX ADMIN — IBUPROFEN 600 MG: 600 TABLET, FILM COATED ORAL at 13:35

## 2018-05-22 RX ADMIN — OXYCODONE HYDROCHLORIDE 10 MG: 10 TABLET, FILM COATED, EXTENDED RELEASE ORAL at 13:35

## 2018-05-22 NOTE — ED PROVIDER NOTES
"ED Provider Note    Scribed for Eladio Foster M.D. by Corie Zamorano. 5/22/2018  1:13 PM    Primary care provider: BARBARA Hughes  Means of arrival: ambulance   History obtained from: Patient  History limited by: None    CHIEF COMPLAINT  Chief Complaint   Patient presents with   • Suicidal Ideation       HPI  Cira Ferguson is a 37 y.o. female who presents to the Emergency Department via ambulance for evaluation of SI onset one month ago. Patient was brought here by EMS from [place] for medical clearance. Patient reports that she recently lost her car and her job within a couple of days which she believes has exacerbated her symptoms. She states that she is tired of life and is suicidal on exam. Patient states that she has a plan to \"blow her head off\" with a gun but states she cannot as \"she is too vain\". She admits to recent methamphetamine use several days ago and reports she took sleeping pills as well. Additionally, patient reports chronic back pain secondary to an L3-L4 infusion and reports associated pain as she has not taken her pain medication for several days. Her pain is exacerbated with sitting down. No complaints of HI.    REVIEW OF SYSTEMS  Pertinent negatives include no HI.   As above, all other systems reviewed and are negative.   See HPI for further details.   C    PAST MEDICAL HISTORY   has a past medical history of Bipolar disorder ([facility]); Cervical cancer ([facility]); DVT (deep venous thrombosis) ([facility]) (2013); Fall; Hepatitis C; Infectious disease; IV drug abuse; PE (pulmonary thromboembolism) ([facility]) (2013); Psychiatric disorder; Schizoaffective disorder ([facility]); and Schizophrenia ([facility]).    SURGICAL HISTORY   has a past surgical history that includes lumbar laminectomy diskectomy (5/17/2011); gyn surgery; primary c section; lumbar laminectomy diskectomy (11/10/2011); lumbar fusion posterior (11/5/2012); lumbar decompression (11/5/2012); vaginal hysterectomy scope total (10/23/2014); " salpingectomy (10/23/2014); anterior and posterior repair (10/23/2014); enterocele repair (10/23/2014); vaginal suspension (10/23/2014); bladder sling female (10/23/2014); and cystoscopy (10/23/2014).    SOCIAL HISTORY  Social History   Substance Use Topics   • Smoking status: Current Every Day Smoker     Packs/day: 0.50     Years: 15.00     Types: Cigarettes   • Smokeless tobacco: Never Used   • Alcohol use No      History   Drug Use No       FAMILY HISTORY  Family History   Problem Relation Age of Onset   • Heart Disease Mother    • Diabetes Mother    • Lung Disease Mother    • Alcohol/Drug Father        CURRENT MEDICATIONS  Current medications can be reviewed in the nurse's note.     ALLERGIES  Allergies   Allergen Reactions   • Contrast Media With Iodine [Iodine]    • Dilaudid [Hydromorphone Hcl] Hives   • Sulfa Drugs        PHYSICAL EXAM  VITAL SIGNS: /93   Pulse 88   Temp 36.5 °C (97.7 °F)   Resp 18   LMP 05/01/2015     Constitutional: Well developed, Well nourished, Non-toxic appearance. Patient is tearful on exam.   HENT: Normocephalic, Atraumatic, Bilateral external ears normal, Oropharynx is clear mucous membranes are moist. No oral exudates or nasal discharge.   Eyes: Pupils are equal round and reactive, EOMI, Conjunctiva normal, No discharge.   Neck: Normal range of motion, No tenderness, Supple, No stridor. No meningismus.  Lymphatic: No lymphadenopathy noted.   Cardiovascular: Regular rate and rhythm without murmur rub or gallop.  Thorax & Lungs: Clear breath sounds bilaterally without wheezes, rhonchi or rales. There is no chest wall tenderness.   Abdomen: Soft non-tender non-distended. There is no rebound or guarding. No organomegaly is appreciated. Bowel sounds are normal.  Skin: Normal without rash.   Back: No CVA or spinal tenderness.   Extremities: Intact distal pulses, No edema, No tenderness, No cyanosis, No clubbing. Capillary refill is less than 2 seconds.  Musculoskeletal: Good  "range of motion in all major joints. No tenderness to palpation or major deformities noted.   Neurologic: Alert & oriented x 3, Normal motor function, Normal sensory function, No focal deficits noted. Reflexes are normal.  Psychiatric: Emotionally labile, tearful, pressured speech, complaining of chronic back pain and wanting to shoot herself with a gun but says she cannot because \"she's too vain\".      DIAGNOSTIC STUDIES / PROCEDURES    LABS  Labs Reviewed   URINE DRUG SCREEN - Abnormal; Notable for the following:        Result Value    Amphetamines Urine Positive (*)     Opiates Positive (*)     Oxycodone Positive (*)     All other components within normal limits   POC BREATHALIZER - Normal      All labs reviewed by me.    COURSE & MEDICAL DECISION MAKING  Nursing notes, VS, PMSFHx reviewed in chart.    1:13 PM Patient seen and examined at bedside. Ordered for POC breathalyzer, urine drug screen to evaluate. Patient was treated with Ativan 1 mg, OxyContin 10 mg, Motrin 600 mg for her symptoms. Informed patient that I will have life skills consult with her. She is agreeable to the plan of care.     2:42 PM- Lab results were reviewed, which are noted above. Alcohol level is negative. She's positive for oxycodone, amphetamines and opiates in general. His is not surprising given that she gave this history. She is seen by life skills and we filled out the legal paperwork as she is not a good candidate for outpatient therapy given the severity of her suicidal ideation and situational depression. Think she benefit from psychiatric hospitalization.    Patient seems to be more pain control now that we've given her medication and is also more relaxed and less tearful.    3:30 PM- Care was transferred to Dr. Cuellar. Patient will be pending psychiatric hospital transfer    DISPOSITION:  Patient will be transferred to an outpatient psychiatric facility in guarded condition.    FINAL IMPRESSION  1. Suicidal ideation        I, " Corie Zamorano (Scribe), am scribing for, and in the presence of, Eladio Foster M.D..    Electronically signed by: Corie Zamorano (Faisal), 5/22/2018    IEladio M.D. personally performed the services described in this documentation, as scribed by Corie Zamorano in my presence, and it is both accurate and complete.    The note accurately reflects work and decisions made by me.  Eladio Foster  5/22/2018  3:03 PM

## 2018-05-22 NOTE — ED NOTES
Brought in by EMS from [place], sent here for medical clearance.  Patient states she lost her job, is losing her apartment, she is tired of life and knows where she can get her hands on a gun and will kill herself.  She took herself to [place] today.  Patient admits to recent use of marijuana and meth.   She has been seen here for previous attempts, takes haldol but has not taken it for the last 3 days.

## 2018-05-22 NOTE — ED NOTES
Belongings double checked be security total of two bags. Patient placed in a gown, urine collected.

## 2018-05-23 PROBLEM — R45.851 SUICIDAL IDEATION: Status: ACTIVE | Noted: 2018-05-23

## 2018-05-23 PROBLEM — F17.200 TOBACCO DEPENDENCE: Status: ACTIVE | Noted: 2018-05-23

## 2018-05-23 PROCEDURE — 700102 HCHG RX REV CODE 250 W/ 637 OVERRIDE(OP): Performed by: EMERGENCY MEDICINE

## 2018-05-23 PROCEDURE — G0378 HOSPITAL OBSERVATION PER HR: HCPCS

## 2018-05-23 PROCEDURE — A9270 NON-COVERED ITEM OR SERVICE: HCPCS | Performed by: EMERGENCY MEDICINE

## 2018-05-23 PROCEDURE — 99218 PR INITIAL OBSERVATION CARE,LEVL I: CPT | Performed by: INTERNAL MEDICINE

## 2018-05-23 RX ORDER — OXYCODONE HYDROCHLORIDE 10 MG/1
10 TABLET ORAL EVERY 6 HOURS PRN
Status: DISCONTINUED | OUTPATIENT
Start: 2018-05-23 | End: 2018-05-24 | Stop reason: HOSPADM

## 2018-05-23 RX ORDER — GABAPENTIN 300 MG/1
300 CAPSULE ORAL 3 TIMES DAILY
Status: DISCONTINUED | OUTPATIENT
Start: 2018-05-23 | End: 2018-05-23

## 2018-05-23 RX ORDER — GABAPENTIN 300 MG/1
300 CAPSULE ORAL 3 TIMES DAILY
Status: DISCONTINUED | OUTPATIENT
Start: 2018-05-23 | End: 2018-05-24 | Stop reason: HOSPADM

## 2018-05-23 RX ORDER — TOPIRAMATE 25 MG/1
50 TABLET ORAL 2 TIMES DAILY
Status: DISCONTINUED | OUTPATIENT
Start: 2018-05-23 | End: 2018-05-24 | Stop reason: HOSPADM

## 2018-05-23 RX ORDER — ALBUTEROL SULFATE 90 UG/1
2 AEROSOL, METERED RESPIRATORY (INHALATION) EVERY 6 HOURS PRN
Status: DISCONTINUED | OUTPATIENT
Start: 2018-05-23 | End: 2018-05-24 | Stop reason: HOSPADM

## 2018-05-23 RX ORDER — ACETAMINOPHEN 325 MG/1
650 TABLET ORAL EVERY 6 HOURS PRN
Status: DISCONTINUED | OUTPATIENT
Start: 2018-05-23 | End: 2018-05-24 | Stop reason: HOSPADM

## 2018-05-23 RX ORDER — POLYETHYLENE GLYCOL 3350 17 G/17G
1 POWDER, FOR SOLUTION ORAL
Status: DISCONTINUED | OUTPATIENT
Start: 2018-05-23 | End: 2018-05-24 | Stop reason: HOSPADM

## 2018-05-23 RX ORDER — BISACODYL 10 MG
10 SUPPOSITORY, RECTAL RECTAL
Status: DISCONTINUED | OUTPATIENT
Start: 2018-05-23 | End: 2018-05-24 | Stop reason: HOSPADM

## 2018-05-23 RX ORDER — TOPIRAMATE 25 MG/1
25 TABLET ORAL ONCE
Status: DISCONTINUED | OUTPATIENT
Start: 2018-05-23 | End: 2018-05-24 | Stop reason: HOSPADM

## 2018-05-23 RX ORDER — HALOPERIDOL 5 MG/1
5 TABLET ORAL
Status: DISCONTINUED | OUTPATIENT
Start: 2018-05-23 | End: 2018-05-24 | Stop reason: HOSPADM

## 2018-05-23 RX ORDER — AMOXICILLIN 250 MG
2 CAPSULE ORAL 2 TIMES DAILY
Status: DISCONTINUED | OUTPATIENT
Start: 2018-05-23 | End: 2018-05-24 | Stop reason: HOSPADM

## 2018-05-23 RX ADMIN — TOPIRAMATE 50 MG: 25 TABLET, FILM COATED ORAL at 09:23

## 2018-05-23 RX ADMIN — GABAPENTIN 300 MG: 300 CAPSULE ORAL at 02:07

## 2018-05-23 RX ADMIN — OXYCODONE HYDROCHLORIDE 10 MG: 10 TABLET ORAL at 14:17

## 2018-05-23 RX ADMIN — OXYCODONE HYDROCHLORIDE 10 MG: 10 TABLET ORAL at 08:10

## 2018-05-23 RX ADMIN — OXYCODONE HYDROCHLORIDE 10 MG: 10 TABLET ORAL at 02:07

## 2018-05-23 RX ADMIN — GABAPENTIN 300 MG: 300 CAPSULE ORAL at 09:23

## 2018-05-23 RX ADMIN — OXYCODONE HYDROCHLORIDE 10 MG: 10 TABLET ORAL at 21:39

## 2018-05-23 RX ADMIN — HALOPERIDOL 5 MG: 5 TABLET ORAL at 02:07

## 2018-05-23 RX ADMIN — GABAPENTIN 300 MG: 300 CAPSULE ORAL at 15:00

## 2018-05-23 RX ADMIN — GABAPENTIN 300 MG: 300 CAPSULE ORAL at 21:39

## 2018-05-23 RX ADMIN — HALOPERIDOL 5 MG: 5 TABLET ORAL at 21:39

## 2018-05-23 RX ADMIN — TOPIRAMATE 50 MG: 25 TABLET, FILM COATED ORAL at 22:16

## 2018-05-23 ASSESSMENT — PAIN SCALES - GENERAL: PAINLEVEL_OUTOF10: 8

## 2018-05-23 NOTE — PSYCHIATRY
BRIEF PSYCHIATRIC CONSULT NOTE: patient seen, full note to follow.  -Legal Hold: extended  -Sitter: No  -Phone: Yes, to   -Visitors: No  -Orders Placed:yes  -Plan: continue to follow   -Eligible for [facility]: yes

## 2018-05-23 NOTE — ED NOTES
Pt awake, agitated, requesting home medications.  Explained process of pharmacy ordering system, will request from ERP.  Pt verbalizes understanding

## 2018-05-23 NOTE — ED PROVIDER NOTES
ER Provider Addendum Note     Scribed for ALISON FLOWER by Dalila Finn on 5/23/2018 at 7:07 AM.     This is an addendum to the note on Cira Boothe Rochelles.  For further details and full chart entry, see the previously signed ED Provider Note written by Dr. Eladio Foster ([facility]).      1:00 PM On evaluation, patient is resting comfortably. She offers no medical complaints. Awaiting transfer at this time.      The note accurately reflects work and decisions made by me.  Alison Flower  5/23/2018  1:13 PM     IDalila (Scribe), am scribing for, and in the presence of, Alison Flower M.D.    Electronically signed by: Dalila Finn (Scribe), 5/23/2018    Alison MAGALLON M.D. personally performed the services described in this documentation, as scribed by Dalila Finn in my presence, and it is both accurate and complete.
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
## 2018-05-23 NOTE — DISCHARGE PLANNING
Medical Social Work    MSW received a call from [facility] declining pt as they do not take pt's insurance.    Plan: pending acceptance to a behavioral health facility.

## 2018-05-23 NOTE — DISCHARGE PLANNING
Medical Social Work    Referral: Legal Hold    Intervention: Legal Hold Paperwork given to SW by Life [facility] RN: Melissa    Legal Hold Initiated: Date: 05/22/2018  Time: 1155    Legal Hold faxed: Date: 05/22/2018  Time: 5087    Patient’s Insurance Listed on Face Sheet: Demetris    Referrals sent to: [facility], [facility], [facility] and [facility]    Plan: Patient will transfer to mental health facility once acceptance is obtained.

## 2018-05-23 NOTE — PROGRESS NOTES
Patient's home medications have been reviewed by the pharmacy team.     Past Medical History:   Diagnosis Date   • Bipolar disorder (HCC)    • Cervical cancer (HCC)    • DVT (deep venous thrombosis) (HCC) 2013    leg January 2013   • Fall 2010   • Hepatitis C    • Infectious disease     Hep C   • IV drug abuse     Quit 2012 per pt   • PE (pulmonary thromboembolism) (HCC) 2013   • Psychiatric disorder     bipolar   • Schizoaffective disorder (HCC)    • Schizophrenia (HCC)        Patient's Medications   New Prescriptions    No medications on file   Previous Medications    ALBUTEROL 108 (90 BASE) MCG/ACT AERO SOLN INHALATION AEROSOL    Inhale 2 Puffs by mouth every 6 hours as needed for Shortness of Breath.    GABAPENTIN (NEURONTIN) 300 MG CAP    Take 300 mg by mouth 3 times a day.    HALOPERIDOL (HALDOL) 5 MG TAB    Take 1 Tab by mouth every bedtime.    OXYCODONE IMMEDIATE RELEASE (ROXICODONE) 10 MG IMMEDIATE RELEASE TABLET    Take 10 mg by mouth every 6 hours as needed.    TOPIRAMATE (TOPAMAX) 50 MG TABLET    Take 1 Tab by mouth 2 times a day.   Modified Medications    No medications on file   Discontinued Medications    AZITHROMYCIN (ZITHROMAX) 250 MG TAB    As directed    IBUPROFEN (MOTRIN) 800 MG TAB    Take 800 mg by mouth every day.    PREGABALIN (LYRICA) 100 MG CAP    Take 100 mg by mouth 2 times a day.          A:  Medications do not appear to be contributing to current complaints.     P:    No recommendations at this time. Home medications have been reordered as appropriate by ERP. No changes needed.     Roxana Bailon, Pharm.D  Cosigned: Rosey Adame, LolisD
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
## 2018-05-24 VITALS
BODY MASS INDEX: 39.68 KG/M2 | HEIGHT: 72 IN | DIASTOLIC BLOOD PRESSURE: 50 MMHG | WEIGHT: 293 LBS | OXYGEN SATURATION: 94 % | SYSTOLIC BLOOD PRESSURE: 97 MMHG | HEART RATE: 68 BPM | RESPIRATION RATE: 16 BRPM | TEMPERATURE: 97.7 F

## 2018-05-24 PROBLEM — R45.851 SUICIDAL IDEATION: Status: RESOLVED | Noted: 2018-05-23 | Resolved: 2018-05-24

## 2018-05-24 LAB
ANION GAP SERPL CALC-SCNC: 9 MMOL/L (ref 0–11.9)
BUN SERPL-MCNC: 13 MG/DL (ref 8–22)
CALCIUM SERPL-MCNC: 8.8 MG/DL (ref 8.5–10.5)
CHLORIDE SERPL-SCNC: 107 MMOL/L (ref 96–112)
CO2 SERPL-SCNC: 18 MMOL/L (ref 20–33)
CREAT SERPL-MCNC: 0.79 MG/DL (ref 0.5–1.4)
ERYTHROCYTE [DISTWIDTH] IN BLOOD BY AUTOMATED COUNT: 42.3 FL (ref 35.9–50)
GLUCOSE SERPL-MCNC: 89 MG/DL (ref 65–99)
HCT VFR BLD AUTO: 49.9 % (ref 37–47)
HGB BLD-MCNC: 15.4 G/DL (ref 12–16)
MCH RBC QN AUTO: 29.8 PG (ref 27–33)
MCHC RBC AUTO-ENTMCNC: 30.9 G/DL (ref 33.6–35)
MCV RBC AUTO: 96.7 FL (ref 81.4–97.8)
PLATELET # BLD AUTO: 153 K/UL (ref 164–446)
PMV BLD AUTO: 10.1 FL (ref 9–12.9)
POTASSIUM SERPL-SCNC: 4.5 MMOL/L (ref 3.6–5.5)
RBC # BLD AUTO: 5.16 M/UL (ref 4.2–5.4)
SODIUM SERPL-SCNC: 134 MMOL/L (ref 135–145)
WBC # BLD AUTO: 6.8 K/UL (ref 4.8–10.8)

## 2018-05-24 PROCEDURE — A9270 NON-COVERED ITEM OR SERVICE: HCPCS | Performed by: EMERGENCY MEDICINE

## 2018-05-24 PROCEDURE — 80048 BASIC METABOLIC PNL TOTAL CA: CPT

## 2018-05-24 PROCEDURE — G0378 HOSPITAL OBSERVATION PER HR: HCPCS

## 2018-05-24 PROCEDURE — A9270 NON-COVERED ITEM OR SERVICE: HCPCS | Performed by: INTERNAL MEDICINE

## 2018-05-24 PROCEDURE — 36415 COLL VENOUS BLD VENIPUNCTURE: CPT

## 2018-05-24 PROCEDURE — 700102 HCHG RX REV CODE 250 W/ 637 OVERRIDE(OP): Performed by: INTERNAL MEDICINE

## 2018-05-24 PROCEDURE — 700102 HCHG RX REV CODE 250 W/ 637 OVERRIDE(OP): Performed by: EMERGENCY MEDICINE

## 2018-05-24 PROCEDURE — 85027 COMPLETE CBC AUTOMATED: CPT

## 2018-05-24 RX ADMIN — OXYCODONE HYDROCHLORIDE 10 MG: 10 TABLET ORAL at 10:41

## 2018-05-24 RX ADMIN — STANDARDIZED SENNA CONCENTRATE AND DOCUSATE SODIUM 2 TABLET: 8.6; 5 TABLET, FILM COATED ORAL at 08:35

## 2018-05-24 RX ADMIN — GABAPENTIN 300 MG: 300 CAPSULE ORAL at 15:24

## 2018-05-24 RX ADMIN — OXYCODONE HYDROCHLORIDE 10 MG: 10 TABLET ORAL at 03:38

## 2018-05-24 RX ADMIN — ACETAMINOPHEN 650 MG: 325 TABLET, FILM COATED ORAL at 01:28

## 2018-05-24 RX ADMIN — TOPIRAMATE 50 MG: 25 TABLET, FILM COATED ORAL at 08:36

## 2018-05-24 RX ADMIN — GABAPENTIN 300 MG: 300 CAPSULE ORAL at 08:36

## 2018-05-24 ASSESSMENT — PATIENT HEALTH QUESTIONNAIRE - PHQ9
8. MOVING OR SPEAKING SO SLOWLY THAT OTHER PEOPLE COULD HAVE NOTICED. OR THE OPPOSITE, BEING SO FIGETY OR RESTLESS THAT YOU HAVE BEEN MOVING AROUND A LOT MORE THAN USUAL: NOT AT ALL
SUM OF ALL RESPONSES TO PHQ9 QUESTIONS 1 AND 2: 2
1. LITTLE INTEREST OR PLEASURE IN DOING THINGS: SEVERAL DAYS
6. FEELING BAD ABOUT YOURSELF - OR THAT YOU ARE A FAILURE OR HAVE LET YOURSELF OR YOUR FAMILY DOWN: NEARLY EVERY DAY
4. FEELING TIRED OR HAVING LITTLE ENERGY: NEARLY EVERY DAY
9. THOUGHTS THAT YOU WOULD BE BETTER OFF DEAD, OR OF HURTING YOURSELF: NOT AT ALL
SUM OF ALL RESPONSES TO PHQ QUESTIONS 1-9: 11
5. POOR APPETITE OR OVEREATING: NOT AT ALL
3. TROUBLE FALLING OR STAYING ASLEEP OR SLEEPING TOO MUCH: NEARLY EVERY DAY
2. FEELING DOWN, DEPRESSED, IRRITABLE, OR HOPELESS: SEVERAL DAYS
7. TROUBLE CONCENTRATING ON THINGS, SUCH AS READING THE NEWSPAPER OR WATCHING TELEVISION: NOT AT ALL

## 2018-05-24 ASSESSMENT — LIFESTYLE VARIABLES: EVER_SMOKED: YES

## 2018-05-24 ASSESSMENT — PAIN SCALES - GENERAL
PAINLEVEL_OUTOF10: 9
PAINLEVEL_OUTOF10: 6
PAINLEVEL_OUTOF10: 9

## 2018-05-24 NOTE — DISCHARGE INSTRUCTIONS
Discharge Instructions    Discharged to home by taxi with self. Discharged via wheelchair, hospital escort: Yes.  Special equipment needed: Not Applicable    Be sure to schedule a follow-up appointment with your primary care doctor or any specialists as instructed.     Discharge Plan:   Diet Plan: Discussed  Activity Level: Discussed  Smoking Cessation Offered: Patient Counseled  Confirmed Follow up Appointment: Appointment Scheduled  Confirmed Symptoms Management: Discussed  Medication Reconciliation Updated: Yes  Influenza Vaccine Indication: Not indicated: Previously immunized this influenza season and > 8 years of age    I understand that a diet low in cholesterol, fat, and sodium is recommended for good health. Unless I have been given specific instructions below for another diet, I accept this instruction as my diet prescription.   Other diet: Regular    Special Instructions: None    · Is patient discharged on Warfarin / Coumadin?   No     Depression / Suicide Risk    As you are discharged from this [facility] facility, it is important to learn how to keep safe from harming yourself.    Recognize the warning signs:  · Abrupt changes in personality, positive or negative- including increase in energy   · Giving away possessions  · Change in eating patterns- significant weight changes-  positive or negative  · Change in sleeping patterns- unable to sleep or sleeping all the time   · Unwillingness or inability to communicate  · Depression  · Unusual sadness, discouragement and loneliness  · Talk of wanting to die  · Neglect of personal appearance   · Rebelliousness- reckless behavior  · Withdrawal from people/activities they love  · Confusion- inability to concentrate     If you or a loved one observes any of these behaviors or has concerns about self-harm, here's what you can do:  · Talk about it- your feelings and reasons for harming yourself  · Remove any means that you might use to hurt yourself (examples:  pills, rope, extension cords, firearm)  · Get professional help from the community (Mental Health, Substance Abuse, psychological counseling)  · Do not be alone:Call your Safe Contact- someone whom you trust who will be there for you.  · Call your local CRISIS HOTLINE 403-8345 or 762-635-3811  · Call your local Children's Mobile Crisis Response Team [place] (178) 683-0500 or www.[employer]  · Call the toll free National Suicide Prevention Hotlines   · National Suicide Prevention Lifeline 071-126-VBVL (2595)  · [employer] Hope Line Network 800-SUICIDE (143-4417)      Steps to Quit Smoking  Smoking tobacco can be bad for your health. It can also affect almost every organ in your body. Smoking puts you and people around you at risk for many serious long-lasting (chronic) diseases. Quitting smoking is hard, but it is one of the best things that you can do for your health. It is never too late to quit.  What are the benefits of quitting smoking?  When you quit smoking, you lower your risk for getting serious diseases and conditions. They can include:  · Lung cancer or lung disease.  · Heart disease.  · Stroke.  · Heart attack.  · Not being able to have children (infertility).  · Weak bones (osteoporosis) and broken bones (fractures).  If you have coughing, wheezing, and shortness of breath, those symptoms may get better when you quit. You may also get sick less often. If you are pregnant, quitting smoking can help to lower your chances of having a baby of low birth weight.  What can I do to help me quit smoking?  Talk with your doctor about what can help you quit smoking. Some things you can do (strategies) include:  · Quitting smoking totally, instead of slowly cutting back how much you smoke over a period of time.  · Going to in-person counseling. You are more likely to quit if you go to many counseling sessions.  · Using resources and support systems, such as:  ¨ Online chats with a counselor.  ¨ Phone  quitlines.  ¨ Printed self-help materials.  ¨ Support groups or group counseling.  ¨ Text messaging programs.  ¨ Mobile phone apps or applications.  · Taking medicines. Some of these medicines may have nicotine in them. If you are pregnant or breastfeeding, do not take any medicines to quit smoking unless your doctor says it is okay. Talk with your doctor about counseling or other things that can help you.  Talk with your doctor about using more than one strategy at the same time, such as taking medicines while you are also going to in-person counseling. This can help make quitting easier.  What things can I do to make it easier to quit?  Quitting smoking might feel very hard at first, but there is a lot that you can do to make it easier. Take these steps:  · Talk to your family and friends. Ask them to support and encourage you.  · Call phone quitlines, reach out to support groups, or work with a counselor.  · Ask people who smoke to not smoke around you.  · Avoid places that make you want (trigger) to smoke, such as:  ¨ Bars.  ¨ Parties.  ¨ Smoke-break areas at work.  · Spend time with people who do not smoke.  · Lower the stress in your life. Stress can make you want to smoke. Try these things to help your stress:  ¨ Getting regular exercise.  ¨ Deep-breathing exercises.  ¨ Yoga.  ¨ Meditating.  ¨ Doing a body scan. To do this, close your eyes, focus on one area of your body at a time from head to toe, and notice which parts of your body are tense. Try to relax the muscles in those areas.  · Download or buy apps on your mobile phone or tablet that can help you stick to your quit plan. There are many free apps, such as QuitGuide from the CDC (Centers for Disease Control and Prevention). You can find more support from smokefree.gov and other websites.  This information is not intended to replace advice given to you by your health care provider. Make sure you discuss any questions you have with your health care  provider.  Document Released: 10/14/2010 Document Revised: 08/15/2017 Document Reviewed: 05/03/2016  [employer] Interactive Patient Education © 2017 [employer]vier Inc.    Hepatitis C  Hepatitis C is a liver infection. It is caused by a germ that can spread through blood and other bodily fluids. Your doctor will use blood and liver tests to:  · Check for this infection.  · Decide how to treat you.  · Check your health after treatment.  Follow these instructions at home:  · Rest.  · Do not take any medicine unless your doctor says it is okay. This includes over-the-counter medicine and birth control pills.  · Do not drink alcohol.  · Do not have sex until your doctor says it is okay.  · Do not share toothbrushes, nail clippers, razors, or needles.  · Take all medicines as told by your doctor.  Contact a doctor if:  · You have a fever.  · Your belly (abdomen) hurts.  · Your pee (urine) is dark.  · Your poop (bowel movement) is the color of shelby.  · You have joint pain.  Get help right away if:  · You feel more and more tired (fatigued).  · You feel more and more weak.  · You do not feel like eating.  · You feel sick to your stomach (nauseous) or throw up (vomit).  · Your skin or the whites of your eyes turn yellow (jaundice) or turn more yellow than they were before.  · You bruise or bleed easily.  This information is not intended to replace advice given to you by your health care provider. Make sure you discuss any questions you have with your health care provider.  Document Released: 11/30/2009 Document Revised: 05/25/2017 Document Reviewed: 04/01/2015  [employer] Interactive Patient Education © 2017 Elsevier Inc.

## 2018-05-24 NOTE — PROGRESS NOTES
Patient admitted to [place] [address] from emergency department. 2 RN skin assessment completed with DESMOND Vance. Skin issues are as follows:    1. Lower back scar from prior back surgery  2. Scar on left side from bra rubbing skin  3.  scar on lower abdomen  4. Slight redness under bilateral breasts, small abrasion/irritation under left breast  5. Dry skin on elbows, heels

## 2018-05-24 NOTE — DISCHARGE SUMMARY
CHIEF COMPLAINT ON ADMISSION  Chief Complaint   Patient presents with   • Suicidal Ideation       CODE STATUS  Full Code    HPI & HOSPITAL COURSE  This is a 37 y.o. female here with ***     {IP Discharge Criteria:79200}    Therefore, she is discharged in {DC Condition:35843438} and stable condition with close outpatient follow-up.    SPECIFIC OUTPATIENT FOLLOW-UP  ***    DISCHARGE PROBLEM LIST  Principal Problem (Resolved):    Suicidal ideation POA: Unknown  Active Problems:    Asthma POA: Yes    Tobacco dependence POA: Unknown      FOLLOW UP  Future Appointments  Date Time Provider Department Center   6/28/2018 8:30 AM Emery Michaels M.D. [facility]     Laz Muller, A.P.R.N.  [address]  [facility] [id number] [phone]    In 1 week      behavioiral health  Recommend referral to see behavioral health within one to two weeks.          MEDICATIONS ON DISCHARGE   Cira Ferguson   Home Medication Instructions YISEL:[id number]    Printed on:05/24/18 1714   Medication Information                      albuterol 108 (90 Base) MCG/ACT Aero Soln inhalation aerosol  Inhale 2 Puffs by mouth every 6 hours as needed for Shortness of Breath.             gabapentin (NEURONTIN) 300 MG Cap  Take 300 mg by mouth 3 times a day.             haloperidol (HALDOL) 5 MG Tab  Take 1 Tab by mouth every bedtime.             oxycodone immediate release (ROXICODONE) 10 MG immediate release tablet  Take 10 mg by mouth every 6 hours as needed.             topiramate (TOPAMAX) 50 MG tablet  Take 1 Tab by mouth 2 times a day.                 DIET  Orders Placed This Encounter   Procedures   • Diet Order     Standing Status:   Standing     Number of Occurrences:   1     Order Specific Question:   Diet:     Answer:   Regular [1]     Comments:   no sharps on diet tray please, legal hold pt       ACTIVITY  {Activity Tolerance:68976704}  {Weigh  Bearin}      CONSULTATIONS  ***    PROCEDURES  ***    LABORATORY  Lab Results   Component Value Date/Time    SODIUM 134 (L) 2018 08:10 AM    POTASSIUM 4.5 2018 08:10 AM    CHLORIDE 107 2018 08:10 AM    CO2 18 (L) 2018 08:10 AM    GLUCOSE 89 2018 08:10 AM    BUN 13 2018 08:10 AM    CREATININE 0.79 2018 08:10 AM        Lab Results   Component Value Date/Time    WBC 6.8 2018 08:10 AM    HEMOGLOBIN 15.4 2018 08:10 AM    HEMATOCRIT 49.9 (H) 2018 08:10 AM    PLATELETCT 153 (L) 2018 08:10 AM        Total time of the discharge process exceeds {DC Process Time:14554132}

## 2018-05-24 NOTE — DISCHARGE PLANNING
ALERT team note:  Brief 1:1 with Ms Ferguson who is still very depressed....blunted affect and no positive future orientation at this time.  She believes her  has left her after 10 years and is unable to locate him.  She is able to eat and drink and is willing to openly communicate with staff.  She is awaiting transfer to a psychiatric hospital.

## 2018-05-24 NOTE — CARE PLAN
Problem: Psychosocial Needs:  Goal: Level of anxiety will decrease  Outcome: PROGRESSING AS EXPECTED  Patient calm, appropriate. Denies a plan to harm herself at this time. Patient on close observation.     Problem: Pain Management  Goal: Pain level will decrease to patient's comfort goal  Outcome: PROGRESSING AS EXPECTED  Patient experiences chronic back pain, requested PRN medication. Administered oxycodone 10mg for pain control. Patient appears to be resting after administration.

## 2018-05-24 NOTE — PSYCHIATRY
PSYCHIATRIC FOLLOW-UP:  *Reason for admission:Brought in by EMS from [place], sent here for medical clearance.  Patient states she lost her job, is losing her apartment, she is tired of life and knows where she can get her hands on a gun and will kill herself.  She took herself to [place] today.  Patient admits to recent use of marijuana and meth.     Legal Hold Status:+, now dc'd     She has slept and feels better though becomes quietly tearful at moments. She says that is because although she knows her  of 11 years is not good for her, she suspects he has left her. She has a lot of support: her mom particularly and says she feels safe to be discharged: she wants to make sure the apt is paid, it is due tomorrow, and wants to see mom. She states she will return to Dr BAUMAN in [facility] and that she likes and trusts him a lot. Concerned she may not have enough haldol left. She says her currently regimen is good for her. Asked her about antidepressants and she reports they have tried before and make her manic.     She feels their were many situational stressors that led to her having to come here but she is much better now. As for her job, she is not worried, they fired her when she got an abscess that was thought to be MRSA (it wasn't). She gets work easily.    *Psychiatric (Physical) Examination: observed phenomenon:  *Vitals:Blood pressure (!) 97/50, pulse 68, temperature 36.5 °C (97.7 °F), resp. rate 16, height 1.829 m (6'), weight (!) 152.5 kg (336 lb 3.2 oz), last menstrual period 05/24/2010, SpO2 94 %, not currently breastfeeding.  *Appearance/Attitude:clean, cooperative, good eye contact and morbidly obese  *Muscle Strength:  *Tone/Gait/Station:  *Speech:spontaneous, wnl  *Thought Process/Associations: goal directed  *Abnormal/Psychotic Thoughts (ex): none  *Insight/Judgement:improved  *Orientation:x 4  *Memory: intact  *Attention/Concentration:intact  *Language:fluid  *Fund of Knowledge:not  "tested  *Mood: \"much better\"  *Affect: euthymic though when talking of relationship, sad/         *SI/HI:  denies  Reliability:good    *ASSESSMENT: ( acute, chronic, acute on chronic, complicated, stable, resolved)  Adjustment disorder with depressed mood  Schizoaffective disorder, most recent episode depressed  Methamphetamine use disorder - recent relapse, reports 8 years sober previously     *Plan/Further Workup:   Legal status: discontinued, checked with pharmacy, she has refils on haldol  Signing off.  "

## 2018-05-24 NOTE — DISCHARGE PLANNING
Spoke to Narcisa at [facility]. Reviewing pt's referral, will follow up.    Pt awaiting transfer to psychiatric facility.

## 2018-05-24 NOTE — ASSESSMENT & PLAN NOTE
I spent 3 cminutes, discussing tobacco dependence and cardiac as well as pulmonary risk. Smoking cessation instructions. Code 68080  She is not compliant, she said she will continue.

## 2018-05-24 NOTE — PROGRESS NOTES
Patient arrived on unit via transport with , oriented to unit.  Receiving RN did NOT get report from ER staff.  2 bags with pt belongings tagged with pt information stickers and placed in locked belongings closet.

## 2018-05-24 NOTE — PROGRESS NOTES
Received report and assumed care of patient at 1900. Patient is alert and oriented x4, flat affect, calm. Patient denies having feelings of self-harm this evening, denies suicidal ideation at this time. Encouraged the patient to communicate with RN about her feelings overnight. Emphasized importance of safety while in hospital. Patient on close observation, 1:1 attendant in room. Patient in hospital clothing, all potential harmful items out of room. Patient reporting chronic back pain to which PRN 10mg of oxycodone was given. No other complaints from patient this evening, just wants to sleep. Bed locked and in lowest position, treaded socks in place, call light within reach.

## 2018-05-24 NOTE — RESPIRATORY CARE
COPD EDUCATION by COPD CLINICAL EDUCATOR  5/24/2018 at 5:57 AM by Paula Ruiz     Patient reviewed by COPD education team. Patient does not qualify for COPD program.

## 2018-05-24 NOTE — H&P
"Hospital Medicine History and Physical    Date of Service  5/23/2018    Chief Complaint  Chief Complaint   Patient presents with   • Suicidal Ideation       History of Presenting Illness  37 y.o. female who presented 5/22/2018 with Suicidal Ideation  Please review Dr. Foster ED admission note.  Basically she presented to the Emergency Department 5/22 via ambulance for evaluation of SI. She recently lost her car and her job  which she believes has exacerbated her symptoms. She felt suicidal being tired of life and she has a plan to \"blow her head off\" with a gun but states she cannot as \"she is too vain\". She admits to recent methamphetamine use several days ago and reports she took sleeping pills as well. She also has chronic back pain. She was seen by Behavioral Health Team and was planned for psychiatric placement. ED physician request transfer to medical floor as she has been here over 24 hours now.  She is afebrile and hemodynamically stable.  When I saw her at ED, she is in no acute distress. Cooperative. Auscultation clear. She wants her medications currently continued.     Primary Care Physician  MELANY Hughes.P.RDEVAN.    Consultants  Psychiatry    Code Status  Full    Review of Systems  Review of Systems   Unable to perform ROS: Other   Unreliable.     Past Medical History  Past Medical History:   Diagnosis Date   • DVT (deep venous thrombosis) (HCC) 2013    leg January 2013   • PE (pulmonary thromboembolism) (HCC) 2013   • Bipolar disorder (HCC)    • Cervical cancer (HCC)    • Fall 2010   • Hepatitis C    • Infectious disease     Hep C   • IV drug abuse     Quit 2012 per pt   • Psychiatric disorder     bipolar   • Schizoaffective disorder (HCC)    • Schizophrenia (HCC)        Surgical History  Past Surgical History:   Procedure Laterality Date   • VAGINAL HYSTERECTOMY SCOPE TOTAL  10/23/2014    Performed by Levar Trevino M.D. at SURGERY SAME DAY [facility]   • SALPINGECTOMY  10/23/2014 "    Performed by Levar Trevino M.D. at SURGERY SAME DAY [facility]   • ANTERIOR AND POSTERIOR REPAIR  10/23/2014    Performed by Levar Trevino M.D. at SURGERY SAME DAY [facility]   • ENTEROCELE REPAIR  10/23/2014    Performed by Levar Trevino M.D. at SURGERY SAME DAY [facility]   • VAGINAL SUSPENSION  10/23/2014    Performed by Levar Trevino M.D. at SURGERY SAME DAY [facility]   • BLADDER SLING FEMALE  10/23/2014    Performed by Levar Trevino M.D. at SURGERY SAME DAY [facility]   • CYSTOSCOPY  10/23/2014    Performed by Levar Trevino M.D. at SURGERY SAME DAY [facility]   • LUMBAR FUSION POSTERIOR  2012    Performed by Lauryn French M.D. at SURGERY [facility]   • LUMBAR DECOMPRESSION  2012    Performed by Lauryn French M.D. at SURGERY [facility]   • LUMBAR LAMINECTOMY DISKECTOMY  11/10/2011    Performed by LAURYN FRENCH at SURGERY [facility]   • LUMBAR LAMINECTOMY DISKECTOMY  2011    Performed by RAVI MONTENEGRO at SURGERY [facility]   • GYN SURGERY      abnormal PAP smears   • PRIMARY C SECTION      tubal ligation, , fallopian tube (one jewels)       Medications  No current facility-administered medications on file prior to encounter.      Current Outpatient Prescriptions on File Prior to Encounter   Medication Sig Dispense Refill   • albuterol 108 (90 Base) MCG/ACT Aero Soln inhalation aerosol Inhale 2 Puffs by mouth every 6 hours as needed for Shortness of Breath. 8.5 g 0   • gabapentin (NEURONTIN) 300 MG Cap Take 300 mg by mouth 3 times a day.     • haloperidol (HALDOL) 5 MG Tab Take 1 Tab by mouth every bedtime. 30 Tab 3   • topiramate (TOPAMAX) 50 MG tablet Take 1 Tab by mouth 2 times a day. 60 Tab 3   • oxycodone immediate release (ROXICODONE) 10 MG immediate release tablet Take 10 mg by mouth every 6 hours as needed.  0       Family History  Family History   Problem Relation Age of Onset   • Heart Disease Mother    • Diabetes Mother     • Lung Disease Mother    • Alcohol/Drug Father        Social History  Social History   Substance Use Topics   • Smoking status: Current Every Day Smoker     Packs/day: 0.50     Years: 15.00     Types: Cigarettes   • Smokeless tobacco: Never Used   • Alcohol use No       Allergies  Allergies   Allergen Reactions   • Contrast Media With Iodine [Iodine]    • Dilaudid [Hydromorphone Hcl] Hives   • Sulfa Drugs         Physical Exam  Laboratory   Hemodynamics  Temp (24hrs), Av.4 °C (97.6 °F), Min:35.9 °C (96.6 °F), Max:36.9 °C (98.4 °F)   Temperature: 36.6 °C (97.9 °F)  Pulse  Av.6  Min: 68  Max: 94    Blood Pressure: 132/72      Respiratory      Respiration: 16, Pulse Oximetry: 94 %             Physical Exam   Constitutional: She appears well-developed and well-nourished.   HENT:   Head: Normocephalic and atraumatic.   Eyes: Conjunctivae and EOM are normal. No scleral icterus.   Neck: Normal range of motion. Neck supple.   Cardiovascular: Normal rate and regular rhythm.  Exam reveals no gallop and no friction rub.    No murmur heard.  Pulmonary/Chest: Effort normal and breath sounds normal. No respiratory distress. She has no wheezes. She has no rales.   Abdominal: Soft. Bowel sounds are normal. She exhibits no distension. There is no tenderness. There is no rebound and no guarding.   Musculoskeletal: She exhibits no edema or tenderness.   Neurological: She is alert.   Skin: Skin is warm.   Psychiatric:   Guarded               No results for input(s): ALTSGPT, ASTSGOT, ALKPHOSPHAT, TBILIRUBIN, DBILIRUBIN, GAMMAGT, AMYLASE, LIPASE, ALB, PREALBUMIN, GLUCOSE in the last 72 hours.              Lab Results   Component Value Date    TROPONINI <0.01 2014     Urinalysis:    Lab Results  Component Value Date/Time   SPECGRAVITY 1.028 2018 1331   GLUCOSEUR Negative 2017 1828   KETONES Negative 2017 1828   NITRITE Negative 2017 1828   WBCURINE 5-10 (A) 2011 0530   RBCURINE 2-5 (A)  05/24/2011 1230   BACTERIA Many (A) 05/24/2011 1230   EPITHELCELL Few 05/24/2011 0530        Imaging  No results found.   Assessment/Plan     I anticipate this patient is appropriate for observation status at this time.    * Suicidal ideation   Assessment & Plan    [facility] or [facility] referral placed   Psychiatry consulted        Tobacco dependence   Assessment & Plan    I spent 3 cminutes, discussing tobacco dependence and cardiac as well as pulmonary risk. Smoking cessation instructions. Code 54844  She is not compliant, she said she will continue.        Asthma- (present on admission)   Assessment & Plan    Not exacerbating              VTE prophylaxis: SCD.    I spent71 minutes, reviewing the chart, notes, vitals, labs, imaging, ordering labs, evaluating Cira Ferguson for assessment, enacting the plan above. 50% of the time was spent in counseling Cira Ferguson and answering questions. Discussed with ED physician. Reassurance. Reviewed chart and her answered her needs. Time was devoted to counseling and coordinating care including review of records, pertinent lab data and studies, as well as discussing diagnostic evaluation and work up, planned therapeutic interventions and future disposition of care. Where indicated, the assessment and plan reflect discussion of patient with consultants, other healthcare providers, family members, and additional research needed to obtain further information in formulating the plan of care for Cira Ferguson.

## 2018-05-24 NOTE — DIETARY
NUTRITION SERVICES:  BMI - Pt with BMI >40 (=45.6). Weight loss counseling not appropriate in acute care setting.  RECOMMEND - Referral to outpatient nutrition services for weight management after D/C.      RD available prn

## 2018-05-25 ENCOUNTER — PATIENT OUTREACH (OUTPATIENT)
Dept: HEALTH INFORMATION MANAGEMENT | Facility: OTHER | Age: 38
End: 2018-05-25

## 2018-05-25 NOTE — LETTER
Cira Ferguson  [address]  [place] [address]    May 25, 2018      Dear Cira Ferguson,    [facility] wants to ensure your discharge home is safe and you or your loved ones have had all of your questions answered regarding your care after you leave the hospital.    Our discharge team was unsuccessful in our attempts to contact you telephonically and we wanted to be sure that you had a list of resources and contact information should you have any questions regarding your hospital stay, follow-up instructions, or active medical symptoms.    Questions or Concerns Regarding… Contact   Medical Questions Related to Your Discharge  (7 days a week, 8am-5pm) Contact a Nurse Care Coordinator   [phone]   Medical Questions Not Related to Your Discharge  (24 hours a day / 7 days a week)  Contact the Nurse Health Line   [phone]    Medications or Discharge Instructions Refer to your discharge packet   or contact your -[phone]   Follow-up Appointment(s) Schedule your appointment via [email or web address]   or contact Scheduling [phone]   Billing Review your statement via [email or web address]  or contact Billing [phone]   Medical Records Review your records via [email or web address]   or contact Medical Records [phone]     You can also easily access your medical information, test results and upcoming appointments via the [email or web address] free online health management tool. You can learn more and sign up at [email or web address]. For assistance setting up your [email or web address] account, please call [phone].    Once again, we want to ensure your discharge home is safe and that you have a clear understanding of any next steps in your care. If you have any questions or concerns, please do not hesitate to contact us, we are here for you. Thank you for choosing [facility] for your healthcare needs.    Sincerely,      Your [facility] Healthcare Team

## 2018-06-04 RX ORDER — HALOPERIDOL 5 MG/1
5 TABLET ORAL
Qty: 30 TAB | Refills: 0 | Status: SHIPPED | OUTPATIENT
Start: 2018-06-04 | End: 2018-06-28 | Stop reason: SDUPTHER

## 2018-06-04 RX ORDER — TOPIRAMATE 50 MG/1
50 TABLET, FILM COATED ORAL 2 TIMES DAILY
Qty: 60 TAB | Refills: 0 | Status: SHIPPED | OUTPATIENT
Start: 2018-06-04 | End: 2018-07-03 | Stop reason: SDUPTHER

## 2018-06-12 NOTE — DISCHARGE SUMMARY
"Discharge Summary    CHIEF COMPLAINT ON ADMISSION  Chief Complaint   Patient presents with   • Suicidal Ideation       Reason for Admission  SI     Admission Date  5/22/2018    CODE STATUS  Prior    HPI & HOSPITAL COURSE  This is a 37 y.o. female here with suicidal ideation. She recently lost her car and her job  which she believes has exacerbated her symptoms. She felt suicidal being tired of life and she has a plan to \"blow her head off\" with a gun.  Therefore, the patient was admitted for psychiatric evaluation and put on legal hold.   The patient was seen and evaluated by psychiatrist, Dr. Emelia Saini who diagnosed the patient with adjustment disorder with depressed mood and schizoaffective disorder with most recent episode depressed.  The patient was prescribed haldol and she no longer has suicidal ideation.  Then the patient's legal hold status was discontinued by psychiatrist, Dr. Saini.       Therefore, she is discharged in fair and stable condition to home with close outpatient follow-up.      Discharge Date  5/24/2018    FOLLOW UP ITEMS POST DISCHARGE  The patient to follow up with outpatient psychiatrist to continue treatment.    DISCHARGE DIAGNOSES  Principal Problem (Resolved):    Suicidal ideation POA: Unknown  Active Problems:    Asthma POA: Yes    Tobacco dependence POA: Unknown  adjustment disorder with depressed mood  schizoaffective disorder with most recent episode depressed    FOLLOW UP  Future Appointments  Date Time Provider Department Center   6/28/2018 8:30 AM Emery Michaels M.D. [facility]     Laz Muller, A.P.R.N.  [address]  [place] [address]  [phone]    In 1 week      behavioiral health  Recommend referral to see behavioral health within one to two weeks.          MEDICATIONS ON DISCHARGE     Medication List      CONTINUE taking these medications      Instructions   albuterol 108 (90 Base) MCG/ACT Aers inhalation aerosol   " Doctor's comments:  Sample to be dispensed at [facility] pharmacy  Inhale 2 Puffs by mouth every 6 hours as needed for Shortness of Breath.  Dose:  2 Puff     gabapentin 300 MG Caps  Commonly known as:  NEURONTIN   Take 300 mg by mouth 3 times a day.  Dose:  300 mg     oxyCODONE immediate release 10 MG immediate release tablet  Commonly known as:  ROXICODONE   Take 10 mg by mouth every 6 hours as needed.  Dose:  10 mg        The patient has refill on haldol.    Allergies  Allergies   Allergen Reactions   • Contrast Media With Iodine [Iodine]    • Dilaudid [Hydromorphone Hcl] Hives   • Sulfa Drugs        DIET  Regular diet.    ACTIVITY  As tolerated.      CONSULTATIONS  Psychiatrist: Dr. Emelia Saini    PROCEDURES  None    LABORATORY  Lab Results   Component Value Date    SODIUM 134 (L) 05/24/2018    POTASSIUM 4.5 05/24/2018    CHLORIDE 107 05/24/2018    CO2 18 (L) 05/24/2018    GLUCOSE 89 05/24/2018    BUN 13 05/24/2018    CREATININE 0.79 05/24/2018        Lab Results   Component Value Date    WBC 6.8 05/24/2018    HEMOGLOBIN 15.4 05/24/2018    HEMATOCRIT 49.9 (H) 05/24/2018    PLATELETCT 153 (L) 05/24/2018      Physical Exam   Constitutional: She appears well-developed and well-nourished.   HENT:   Head: Normocephalic and atraumatic.   Eyes: Conjunctivae and EOM are normal. No scleral icterus.   Neck: Normal range of motion. Neck supple.   Cardiovascular: Normal rate and regular rhythm.  Exam reveals no gallop and no friction rub.    No murmur heard.  Pulmonary/Chest: Effort normal and breath sounds normal. No respiratory distress. She has no wheezes. She has no rales.   Abdominal: Soft. Bowel sounds are normal. She exhibits no distension. There is no tenderness. There is no rebound and no guarding.   Musculoskeletal: She exhibits no edema or tenderness.   Neurological: She is alert.   Skin: Skin is warm.   Psychiatric: depressed    Total time of the discharge process exceeds 36 minutes.

## 2018-06-28 ENCOUNTER — OFFICE VISIT (OUTPATIENT)
Dept: BEHAVIORAL HEALTH | Facility: PHYSICIAN GROUP | Age: 38
End: 2018-06-28

## 2018-06-28 VITALS
WEIGHT: 293 LBS | RESPIRATION RATE: 16 BRPM | TEMPERATURE: 97.7 F | HEIGHT: 71 IN | DIASTOLIC BLOOD PRESSURE: 78 MMHG | SYSTOLIC BLOOD PRESSURE: 118 MMHG | HEART RATE: 80 BPM | BODY MASS INDEX: 41.02 KG/M2

## 2018-06-28 DIAGNOSIS — F25.0 SCHIZOAFFECTIVE DISORDER, BIPOLAR TYPE (HCC): ICD-10-CM

## 2018-06-28 DIAGNOSIS — F43.23 ADJUSTMENT DISORDER WITH MIXED ANXIETY AND DEPRESSED MOOD: ICD-10-CM

## 2018-06-28 PROCEDURE — 99213 OFFICE O/P EST LOW 20 MIN: CPT | Performed by: PSYCHIATRY & NEUROLOGY

## 2018-06-28 PROCEDURE — 90833 PSYTX W PT W E/M 30 MIN: CPT | Performed by: PSYCHIATRY & NEUROLOGY

## 2018-06-28 RX ORDER — BENZTROPINE MESYLATE 1 MG/1
1 TABLET ORAL 2 TIMES DAILY
Qty: 60 TAB | Refills: 2 | Status: SHIPPED | OUTPATIENT
Start: 2018-06-28 | End: 2019-02-07 | Stop reason: CLARIF

## 2018-06-28 RX ORDER — HALOPERIDOL 5 MG/1
TABLET ORAL
Qty: 60 TAB | Refills: 2 | Status: SHIPPED | OUTPATIENT
Start: 2018-06-28 | End: 2018-10-04 | Stop reason: SDUPTHER

## 2018-06-28 ASSESSMENT — PATIENT HEALTH QUESTIONNAIRE - PHQ9
SUM OF ALL RESPONSES TO PHQ9 QUESTIONS 1 AND 2: 0
1. LITTLE INTEREST OR PLEASURE IN DOING THINGS: 0
2. FEELING DOWN, DEPRESSED, IRRITABLE, OR HOPELESS: 0

## 2018-06-28 NOTE — PROGRESS NOTES
"[facility]  PSYCHIATRIC FOLLOW-UP NOTE    Name: Cira Ferguson  MRN: [id number]  : 1980  Age: 37 y.o.  Date of assessment: 2018  PCP: BARBARA Hughes  Persons in attendance: Patient  REASON FOR VISIT/CHIEF COMPLAINT (as stated by Patient):  Cira Ferguson is a 37 y.o., Black or  female, attending follow-up appointment for   Chief Complaint   Patient presents with   • Agitation     I lost my job and I am separarted from my .   .      HISTORY OF PRESENT ILLNESS:    This is 36 yo female,  from her , lives with mom, seen today for follow up. The patient lost her job and her insurance. The patients  had an affair and she moved out. The patient is going to get her medicaid back. The patient has been taking haldol 5 mg every bed time. The patient stopped topiramate.      PSYCHOSOCIAL CHANGES SINCE PREVIOUS CONTACT:  The patient is agitated, poor sleep, but denied depression.     RESPONSE TO TREATMENT:  Haldol 5 mg one at night.    MEDICATION SIDE EFFECTS:  Weight gain.    REVIEW OF SYSTEMS:        Constitutional positive - obesity   Eyes negative   Ears/Nose/Mouth/Throat negative   Cardiovascular positive - history of DVT.   Respiratory negative   Gastrointestinal negative   Genitourinary positive - neurogenic bladder.   Muscular negative   Integumentary negative   Neurological positive - chronic back pain.   Endocrine negative   Hematologic/Lymphatic negative       PSYCHIATRIC EXAMINATION/MENTAL STATUS  /78   Pulse 80   Temp 36.5 °C (97.7 °F)   Resp 16   Ht 1.803 m (5' 10.98\")   Wt (!) 156.5 kg (345 lb)   LMP 2015   BMI 48.14 kg/m²   Participation: Active verbal participation and Attentive  Grooming:Neat  Orientation: Alert and Fully Oriented  Eye contact: Good  Behavior:Calm   Mood: Anxious  Affect: Anxious  Thought process: Logical and Goal-directed  Thought content:  Within normal limits  Speech: Rate " within normal limits and Volume within normal limits  Perception:  Within normal limits  Memory:  No gross evidence of memory deficits  Insight: Good  Judgment: Good  Family/couple interaction observations:   Other:    Current risk:    Suicide: Low   Homicide: Low   Self-harm: Low  Relapse: Low  Other:   Crisis Safety Plan reviewed?Yes  If evidence of imminent risk is present, intervention/plan:    Medical Records/Labs/Diagnostic Tests Reviewed: yes.    Medical Records/Labs/Diagnostic Tests Ordered: none.    DIAGNOSTIC IMPRESSION(S):  1. Schizoaffective disorder, bipolar type (HCC)    2. Adjustment disorder with mixed anxiety and depressed mood           ASSESSMENT AND PLAN:    1. Schizoaffective disorder  2. Adjustment disorder, mixed emotions.    Increase haldol 5 mg BID # 60 refills two.  Cogentin 1 mg BID # 60 refills two.    3. The patient is referred to [facility].       16 minutes were spent in psychotherapy.  (If greater than 16 minutes, add 38434 code)   Topics addressed in psychotherapy include:  The patient is helped her with grief and she come to and acceptance.  We focused on her unresolved emotional issues and helped her with emotional skills.  Cognitive restructuring and behavioral changes.  Emery Michaels M.D.

## 2018-07-03 RX ORDER — TOPIRAMATE 50 MG/1
TABLET, FILM COATED ORAL
Qty: 60 TAB | Refills: 2 | Status: SHIPPED | OUTPATIENT
Start: 2018-07-03 | End: 2019-02-07 | Stop reason: CLARIF

## 2018-10-04 RX ORDER — HALOPERIDOL 5 MG/1
TABLET ORAL
Qty: 60 TAB | Refills: 1 | Status: SHIPPED | OUTPATIENT
Start: 2018-10-04 | End: 2018-12-15 | Stop reason: SDUPTHER

## 2018-12-17 RX ORDER — HALOPERIDOL 5 MG/1
TABLET ORAL
Qty: 60 TAB | Refills: 1 | Status: SHIPPED | OUTPATIENT
Start: 2018-12-17 | End: 2019-02-07 | Stop reason: CLARIF

## 2019-02-06 ENCOUNTER — HOSPITAL ENCOUNTER (EMERGENCY)
Facility: MEDICAL CENTER | Age: 39
End: 2019-02-07
Attending: EMERGENCY MEDICINE

## 2019-02-06 DIAGNOSIS — F11.10 OPIATE ABUSE, CONTINUOUS (HCC): ICD-10-CM

## 2019-02-06 DIAGNOSIS — F15.10 METHAMPHETAMINE ABUSE (HCC): ICD-10-CM

## 2019-02-06 DIAGNOSIS — L01.00 IMPETIGO: ICD-10-CM

## 2019-02-06 DIAGNOSIS — R45.851 SUICIDAL IDEATION: ICD-10-CM

## 2019-02-06 LAB
AMPHET UR QL SCN: POSITIVE
BARBITURATES UR QL SCN: NEGATIVE
BENZODIAZ UR QL SCN: NEGATIVE
BZE UR QL SCN: NEGATIVE
CANNABINOIDS UR QL SCN: POSITIVE
HCG UR QL: NEGATIVE
METHADONE UR QL SCN: NEGATIVE
OPIATES UR QL SCN: POSITIVE
OXYCODONE UR QL SCN: NEGATIVE
PCP UR QL SCN: NEGATIVE
POC BREATHALIZER: 0 PERCENT (ref 0–0.01)
PROPOXYPH UR QL SCN: NEGATIVE
SP GR UR REFRACTOMETRY: 1.02

## 2019-02-06 PROCEDURE — A9270 NON-COVERED ITEM OR SERVICE: HCPCS | Performed by: EMERGENCY MEDICINE

## 2019-02-06 PROCEDURE — 90791 PSYCH DIAGNOSTIC EVALUATION: CPT

## 2019-02-06 PROCEDURE — 99285 EMERGENCY DEPT VISIT HI MDM: CPT

## 2019-02-06 PROCEDURE — 302970 POC BREATHALIZER: Performed by: EMERGENCY MEDICINE

## 2019-02-06 PROCEDURE — 80307 DRUG TEST PRSMV CHEM ANLYZR: CPT

## 2019-02-06 PROCEDURE — 81025 URINE PREGNANCY TEST: CPT

## 2019-02-06 PROCEDURE — 700102 HCHG RX REV CODE 250 W/ 637 OVERRIDE(OP): Performed by: EMERGENCY MEDICINE

## 2019-02-06 PROCEDURE — 700101 HCHG RX REV CODE 250: Performed by: EMERGENCY MEDICINE

## 2019-02-06 RX ORDER — PROMETHAZINE HYDROCHLORIDE 25 MG/1
25 TABLET ORAL ONCE
Status: COMPLETED | OUTPATIENT
Start: 2019-02-06 | End: 2019-02-06

## 2019-02-06 RX ORDER — HALOPERIDOL 5 MG/1
5 TABLET ORAL ONCE
Status: COMPLETED | OUTPATIENT
Start: 2019-02-06 | End: 2019-02-06

## 2019-02-06 RX ORDER — CLONIDINE HYDROCHLORIDE 0.1 MG/1
0.1 TABLET ORAL ONCE
Status: COMPLETED | OUTPATIENT
Start: 2019-02-06 | End: 2019-02-06

## 2019-02-06 RX ORDER — ONDANSETRON 4 MG/1
4 TABLET, ORALLY DISINTEGRATING ORAL EVERY 6 HOURS PRN
Status: DISCONTINUED | OUTPATIENT
Start: 2019-02-06 | End: 2019-02-07 | Stop reason: HOSPADM

## 2019-02-06 RX ORDER — CLONIDINE HYDROCHLORIDE 0.1 MG/1
.1-.2 TABLET ORAL 3 TIMES DAILY PRN
Status: DISCONTINUED | OUTPATIENT
Start: 2019-02-06 | End: 2019-02-07 | Stop reason: HOSPADM

## 2019-02-06 RX ORDER — PROMETHAZINE HYDROCHLORIDE 25 MG/1
25-50 TABLET ORAL EVERY 6 HOURS PRN
Status: DISCONTINUED | OUTPATIENT
Start: 2019-02-06 | End: 2019-02-07

## 2019-02-06 RX ORDER — GABAPENTIN 300 MG/1
300 CAPSULE ORAL 3 TIMES DAILY
Status: DISCONTINUED | OUTPATIENT
Start: 2019-02-06 | End: 2019-02-07 | Stop reason: HOSPADM

## 2019-02-06 RX ORDER — HALOPERIDOL 5 MG/1
5 TABLET ORAL 2 TIMES DAILY
Status: DISCONTINUED | OUTPATIENT
Start: 2019-02-06 | End: 2019-02-07 | Stop reason: HOSPADM

## 2019-02-06 RX ADMIN — MUPIROCIN 1 APPLICATION: 20 OINTMENT TOPICAL at 18:25

## 2019-02-06 RX ADMIN — PROMETHAZINE HYDROCHLORIDE 25 MG: 25 TABLET ORAL at 14:51

## 2019-02-06 RX ADMIN — HALOPERIDOL 5 MG: 5 TABLET ORAL at 21:05

## 2019-02-06 RX ADMIN — CLONIDINE HYDROCHLORIDE 0.1 MG: 0.1 TABLET ORAL at 14:51

## 2019-02-06 RX ADMIN — GABAPENTIN 300 MG: 300 CAPSULE ORAL at 16:09

## 2019-02-06 RX ADMIN — HALOPERIDOL 5 MG: 5 TABLET ORAL at 14:51

## 2019-02-06 ASSESSMENT — LIFESTYLE VARIABLES: DO YOU DRINK ALCOHOL: NO
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
## 2019-02-06 NOTE — ED NOTES
Suicide precautions initiated immediately on arrival of patient. This RN chaperoned patient in bathroom with direct observation. Clothing and pt belongings placed in patient belongings bag. Pt belongings bag searched by security. Security obtained pts lighter and will return to pt upon dc. All other belongings placed in LOCKER #4 after being cleared by security. Pt updated and aware.

## 2019-02-06 NOTE — ED NOTES
Pt has multiple skin lesions, one on left knee, and another on nose. Pt complaining of pain from skin lesions. This RN to notify ERP.
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
## 2019-02-06 NOTE — ED NOTES
Pt resting in room. No distress. Pt remains in constant supervision of 1:1 sitter. Continue to monitor.

## 2019-02-07 ENCOUNTER — TELEPHONE (OUTPATIENT)
Dept: PHARMACY | Facility: MEDICAL CENTER | Age: 39
End: 2019-02-07

## 2019-02-07 VITALS
OXYGEN SATURATION: 98 % | HEART RATE: 84 BPM | BODY MASS INDEX: 39.28 KG/M2 | WEIGHT: 290 LBS | TEMPERATURE: 97.7 F | RESPIRATION RATE: 16 BRPM | HEIGHT: 72 IN | SYSTOLIC BLOOD PRESSURE: 120 MMHG | DIASTOLIC BLOOD PRESSURE: 65 MMHG

## 2019-02-07 PROCEDURE — 700102 HCHG RX REV CODE 250 W/ 637 OVERRIDE(OP): Performed by: EMERGENCY MEDICINE

## 2019-02-07 PROCEDURE — A9270 NON-COVERED ITEM OR SERVICE: HCPCS | Performed by: EMERGENCY MEDICINE

## 2019-02-07 PROCEDURE — 99284 EMERGENCY DEPT VISIT MOD MDM: CPT | Performed by: PSYCHIATRY & NEUROLOGY

## 2019-02-07 RX ORDER — CLINDAMYCIN HYDROCHLORIDE 150 MG/1
450 CAPSULE ORAL 4 TIMES DAILY
Qty: 84 CAP | Refills: 0 | Status: SHIPPED | OUTPATIENT
Start: 2019-02-07 | End: 2019-02-14

## 2019-02-07 RX ORDER — CLINDAMYCIN HYDROCHLORIDE 150 MG/1
450 CAPSULE ORAL EVERY 6 HOURS
Status: DISCONTINUED | OUTPATIENT
Start: 2019-02-07 | End: 2019-02-07 | Stop reason: HOSPADM

## 2019-02-07 RX ADMIN — CLINDAMYCIN HYDROCHLORIDE 450 MG: 150 CAPSULE ORAL at 01:28

## 2019-02-07 RX ADMIN — CLINDAMYCIN HYDROCHLORIDE 450 MG: 150 CAPSULE ORAL at 08:27

## 2019-02-07 RX ADMIN — HALOPERIDOL 5 MG: 5 TABLET ORAL at 08:27

## 2019-02-07 RX ADMIN — GABAPENTIN 300 MG: 300 CAPSULE ORAL at 06:14

## 2019-02-07 RX ADMIN — MUPIROCIN 1 APPLICATION: 20 OINTMENT TOPICAL at 06:00

## 2019-02-07 ASSESSMENT — ENCOUNTER SYMPTOMS
SEIZURES: 0
HALLUCINATIONS: 0
MYALGIAS: 1
NAUSEA: 1
SHORTNESS OF BREATH: 0
DEPRESSION: 0
DIAPHORESIS: 1
CHILLS: 1

## 2019-02-07 ASSESSMENT — LIFESTYLE VARIABLES: SUBSTANCE_ABUSE: 1

## 2019-02-07 NOTE — DISCHARGE PLANNING
Medical Social Work    Referral: Legal Hold    Intervention: Legal Hold Paperwork given to SW by [facility] RN Emily Bartlett    Legal Hold Initiated: Date: 02-06-19 Time: 1240    Patient’s Insurance Listed on Face Sheet:     Referrals sent to: None    This referral contains the following information:  1) Face sheet __x__  2) Page 1 and Page 2 of Legal Hold _x___  3) Alert Team Assessment/Psych Assessment __x__  4) Head to toe physical exam ___x_  5) Urine Drug Screen __x__  6) Blood Alcohol _x___  7) Vital signs __x__  8) Pregnancy test when applicable _x__  9) Medications list _x___    Plan: Patient will transfer to mental health facility once acceptance is obtained

## 2019-02-07 NOTE — ED PROVIDER NOTES
ED Provider Note    1:15 AM - I was called to the patient's bedside to evaluate skin lesions.  The patient has erythematous patches with central crusting on the posterior left thigh as well as left knee and dorsum of the left hand.  No obvious fluctuance was noted in these areas and the patient has not had a fever.  I have low clinical suspicion for abscess, sepsis, or other serious infectious etiology.  However, given her poor living conditions and history of drug abuse, she was started on Clindamycin (she has an allergy to sulfa) for cellulitis and to cover for MRSA.
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
## 2019-02-07 NOTE — ED NOTES
Pt has no SI/HI at this time, per dr/psych pt is off legal and able to go home.  Pt utilized phone and is ambulating back to room at this time

## 2019-02-07 NOTE — DISCHARGE PLANNING
CASSIDY approached by Alert Team  [place] regarding Pt requesting an inpatient treatment center. Pt does not have any insurance. Pt is currently on Legal Hold and may be in the ED for a couple days. CASSIDY sent a request to [facility] to visit with Pt regarding starting a Medicaid Application.

## 2019-02-07 NOTE — ED PROVIDER NOTES
ED Provider Note Addendum    Scribed for Chan Brandt M.D. by Alonso Castaneda on 2/7/2019 at 7:00 AM.     This is an addendum to the note on Cira Ferguson.  For further details and full chart information, see patient's initial note.       7:00 AM - I discussed the patient's case with Dr. Dunlap ([facility]) who will transfer care of the patient to me at this time.        11:10 AM - Nurse informs that he patient has been taken off legal hold by psychiatry. She is currently awaiting a ride home.     11:12 AM - Patient reevaluated at bedside by me. Patient has done well under my care. They have been consulted with [facility] who has cleared them for discharge. Patient's suicidal ideation is resolved at this time and a good plan has been established for outpatient care. I discussed resources to help her discontinue her substance abuse. Patient has been provided with these written instructions and strict return precautions for any new or worsening symptoms. They are agreeable to this course of treatment and discharge home.     Discharge vitals: /77   Pulse 85   Temp 36.5 °C (97.7 °F) (Temporal)   Resp 20   Ht 1.829 m (6')   Wt (!) 131.5 kg (290 lb)   LMP 05/01/2015   SpO2 97%   BMI 39.33 kg/m²      The patient will return for new or worsening symptoms and is stable at the time of discharge.    DISPOSITION:  Patient will be discharged home in stable condition.    OUTPATIENT MEDICATIONS:  New Prescriptions    CLINDAMYCIN (CLEOCIN) 150 MG CAP    Take 3 Caps by mouth 4 times a day for 7 days.       FINAL IMPRESSION  1. Suicidal ideation    2. Methamphetamine abuse (HCC)    3. Opiate abuse, continuous (HCC)    4. Impetigo         Alonso MAGALLON (Faisal), am scribing for, and in the presence of, Chan Brandt M.D..    Electronically signed by: Alonso Castaneda (Faisal), 2/7/2019    Chan MAGALLON M.D. personally performed the services described in this documentation, as scribed by Alonso Castaneda in  my presence, and it is both accurate and complete.    The note accurately reflects work and decisions made by me.  Chan Brandt  2/7/2019  2:24 PM

## 2019-02-07 NOTE — ED NOTES
Med rec completed per pt at bedside  Allergies reviewed  No PO antibiotics in the last 30 days    Pt states that she only takes her Oxycodone. (Verified)    Pt states that she doesn't have insurance so she doesn't take her other meds. Pt states, it's been about 6 weeks.

## 2019-02-07 NOTE — ED NOTES
contacted this RN. Need to obtain urine pregnancy test for placement. This RN adding to existing urine sample.

## 2019-02-07 NOTE — ED NOTES
Pt resting in room no distress. Respirations equal and unlabored. Equal chest rise and fall. Continue to monitor.

## 2019-02-07 NOTE — DISCHARGE PLANNING
Alert team  note:  38 year old female evaluated by [facility] ED psych team, to be discharged to self today; pt given community CD resources, including [facility], [facility], [facility] (no insurance plan) and [facility] for med mgmt; pt verbalized understanding; no SI, self-harm ideation, or HI noted

## 2019-02-07 NOTE — ED NOTES
Pt ambulatory with steady gait, pt verbalized understanding of poc and discharge , no questions ,  VSS and pt in nad at this time  Pt again denies HI/SI, pt is calling for a ride from friends and has a place to go, pt provided resources for SI hotline and to follow up with pcp in 1 day for skin infection,
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
## 2019-03-11 RX ORDER — HALOPERIDOL 5 MG/1
TABLET ORAL
Qty: 60 TAB | Refills: 0 | Status: SHIPPED | OUTPATIENT
Start: 2019-03-11 | End: 2020-01-20

## 2020-01-20 ENCOUNTER — HOSPITAL ENCOUNTER (OUTPATIENT)
Facility: MEDICAL CENTER | Age: 40
End: 2020-01-27
Attending: EMERGENCY MEDICINE | Admitting: HOSPITALIST
Payer: MEDICAID

## 2020-01-20 ENCOUNTER — HOSPITAL ENCOUNTER (OUTPATIENT)
Dept: RADIOLOGY | Facility: MEDICAL CENTER | Age: 40
End: 2020-01-20
Attending: NEUROLOGICAL SURGERY

## 2020-01-20 DIAGNOSIS — M54.16 LUMBAR NERVE ROOT COMPRESSION: ICD-10-CM

## 2020-01-20 DIAGNOSIS — M54.16 LUMBAR RADICULOPATHY: ICD-10-CM

## 2020-01-20 PROBLEM — B18.2 CHRONIC HEPATITIS C WITHOUT HEPATIC COMA (HCC): Status: ACTIVE | Noted: 2020-01-20

## 2020-01-20 LAB
ANION GAP SERPL CALC-SCNC: 7 MMOL/L (ref 0–11.9)
BASOPHILS # BLD AUTO: 0.6 % (ref 0–1.8)
BASOPHILS # BLD: 0.05 K/UL (ref 0–0.12)
BUN SERPL-MCNC: 8 MG/DL (ref 8–22)
CALCIUM SERPL-MCNC: 9.2 MG/DL (ref 8.5–10.5)
CHLORIDE SERPL-SCNC: 106 MMOL/L (ref 96–112)
CO2 SERPL-SCNC: 24 MMOL/L (ref 20–33)
CREAT SERPL-MCNC: 0.95 MG/DL (ref 0.5–1.4)
EOSINOPHIL # BLD AUTO: 0.22 K/UL (ref 0–0.51)
EOSINOPHIL NFR BLD: 2.7 % (ref 0–6.9)
ERYTHROCYTE [DISTWIDTH] IN BLOOD BY AUTOMATED COUNT: 39.8 FL (ref 35.9–50)
GLUCOSE SERPL-MCNC: 72 MG/DL (ref 65–99)
HCT VFR BLD AUTO: 44.4 % (ref 37–47)
HGB BLD-MCNC: 14.6 G/DL (ref 12–16)
IMM GRANULOCYTES # BLD AUTO: 0.02 K/UL (ref 0–0.11)
IMM GRANULOCYTES NFR BLD AUTO: 0.2 % (ref 0–0.9)
LYMPHOCYTES # BLD AUTO: 2.32 K/UL (ref 1–4.8)
LYMPHOCYTES NFR BLD: 28.9 % (ref 22–41)
MCH RBC QN AUTO: 30 PG (ref 27–33)
MCHC RBC AUTO-ENTMCNC: 32.9 G/DL (ref 33.6–35)
MCV RBC AUTO: 91.4 FL (ref 81.4–97.8)
MONOCYTES # BLD AUTO: 0.3 K/UL (ref 0–0.85)
MONOCYTES NFR BLD AUTO: 3.7 % (ref 0–13.4)
NEUTROPHILS # BLD AUTO: 5.11 K/UL (ref 2–7.15)
NEUTROPHILS NFR BLD: 63.9 % (ref 44–72)
NRBC # BLD AUTO: 0 K/UL
NRBC BLD-RTO: 0 /100 WBC
PLATELET # BLD AUTO: 315 K/UL (ref 164–446)
PMV BLD AUTO: 10 FL (ref 9–12.9)
POTASSIUM SERPL-SCNC: 4.1 MMOL/L (ref 3.6–5.5)
RBC # BLD AUTO: 4.86 M/UL (ref 4.2–5.4)
SODIUM SERPL-SCNC: 137 MMOL/L (ref 135–145)
WBC # BLD AUTO: 8 K/UL (ref 4.8–10.8)

## 2020-01-20 PROCEDURE — 96374 THER/PROPH/DIAG INJ IV PUSH: CPT

## 2020-01-20 PROCEDURE — 700111 HCHG RX REV CODE 636 W/ 250 OVERRIDE (IP): Performed by: HOSPITALIST

## 2020-01-20 PROCEDURE — 85025 COMPLETE CBC W/AUTO DIFF WBC: CPT

## 2020-01-20 PROCEDURE — 96375 TX/PRO/DX INJ NEW DRUG ADDON: CPT

## 2020-01-20 PROCEDURE — A9270 NON-COVERED ITEM OR SERVICE: HCPCS | Performed by: HOSPITALIST

## 2020-01-20 PROCEDURE — 700102 HCHG RX REV CODE 250 W/ 637 OVERRIDE(OP): Performed by: HOSPITALIST

## 2020-01-20 PROCEDURE — 99285 EMERGENCY DEPT VISIT HI MDM: CPT

## 2020-01-20 PROCEDURE — G0378 HOSPITAL OBSERVATION PER HR: HCPCS

## 2020-01-20 PROCEDURE — 80048 BASIC METABOLIC PNL TOTAL CA: CPT

## 2020-01-20 PROCEDURE — 700102 HCHG RX REV CODE 250 W/ 637 OVERRIDE(OP): Performed by: EMERGENCY MEDICINE

## 2020-01-20 PROCEDURE — 700111 HCHG RX REV CODE 636 W/ 250 OVERRIDE (IP): Performed by: EMERGENCY MEDICINE

## 2020-01-20 PROCEDURE — 99220 PR INITIAL OBSERVATION CARE,LEVL III: CPT | Performed by: HOSPITALIST

## 2020-01-20 PROCEDURE — 700101 HCHG RX REV CODE 250: Performed by: HOSPITALIST

## 2020-01-20 PROCEDURE — A9270 NON-COVERED ITEM OR SERVICE: HCPCS | Performed by: EMERGENCY MEDICINE

## 2020-01-20 RX ORDER — ONDANSETRON 4 MG/1
4 TABLET, ORALLY DISINTEGRATING ORAL EVERY 4 HOURS PRN
Status: DISCONTINUED | OUTPATIENT
Start: 2020-01-20 | End: 2020-01-23

## 2020-01-20 RX ORDER — LIDOCAINE 50 MG/G
1 PATCH TOPICAL EVERY 24 HOURS
Status: DISCONTINUED | OUTPATIENT
Start: 2020-01-20 | End: 2020-01-27 | Stop reason: HOSPADM

## 2020-01-20 RX ORDER — HALOPERIDOL 5 MG/1
5 TABLET ORAL 3 TIMES DAILY
COMMUNITY
End: 2020-03-17

## 2020-01-20 RX ORDER — PROMETHAZINE HYDROCHLORIDE 25 MG/1
12.5-25 TABLET ORAL EVERY 4 HOURS PRN
Status: DISCONTINUED | OUTPATIENT
Start: 2020-01-20 | End: 2020-01-23

## 2020-01-20 RX ORDER — ONDANSETRON 2 MG/ML
4 INJECTION INTRAMUSCULAR; INTRAVENOUS EVERY 4 HOURS PRN
Status: DISCONTINUED | OUTPATIENT
Start: 2020-01-20 | End: 2020-01-23

## 2020-01-20 RX ORDER — GABAPENTIN 300 MG/1
600 CAPSULE ORAL 3 TIMES DAILY
Status: DISCONTINUED | OUTPATIENT
Start: 2020-01-20 | End: 2020-01-27 | Stop reason: HOSPADM

## 2020-01-20 RX ORDER — PROMETHAZINE HYDROCHLORIDE 25 MG/1
12.5-25 SUPPOSITORY RECTAL EVERY 4 HOURS PRN
Status: DISCONTINUED | OUTPATIENT
Start: 2020-01-20 | End: 2020-01-23

## 2020-01-20 RX ORDER — HALOPERIDOL 5 MG/1
5 TABLET ORAL 3 TIMES DAILY
Status: DISCONTINUED | OUTPATIENT
Start: 2020-01-20 | End: 2020-01-24

## 2020-01-20 RX ORDER — OXYCODONE HYDROCHLORIDE AND ACETAMINOPHEN 5; 325 MG/1; MG/1
1 TABLET ORAL ONCE
Status: COMPLETED | OUTPATIENT
Start: 2020-01-20 | End: 2020-01-20

## 2020-01-20 RX ORDER — TRIHEXYPHENIDYL HYDROCHLORIDE 2 MG/1
4 TABLET ORAL
COMMUNITY
End: 2020-03-17

## 2020-01-20 RX ORDER — POLYETHYLENE GLYCOL 3350 17 G/17G
1 POWDER, FOR SOLUTION ORAL
Status: DISCONTINUED | OUTPATIENT
Start: 2020-01-20 | End: 2020-01-23

## 2020-01-20 RX ORDER — OXYCODONE HYDROCHLORIDE AND ACETAMINOPHEN 5; 325 MG/1; MG/1
1 TABLET ORAL EVERY 4 HOURS PRN
Status: DISCONTINUED | OUTPATIENT
Start: 2020-01-20 | End: 2020-01-23

## 2020-01-20 RX ORDER — AMOXICILLIN 250 MG
2 CAPSULE ORAL 2 TIMES DAILY
Status: DISCONTINUED | OUTPATIENT
Start: 2020-01-20 | End: 2020-01-23

## 2020-01-20 RX ORDER — PROCHLORPERAZINE EDISYLATE 5 MG/ML
5-10 INJECTION INTRAMUSCULAR; INTRAVENOUS EVERY 4 HOURS PRN
Status: DISCONTINUED | OUTPATIENT
Start: 2020-01-20 | End: 2020-01-23

## 2020-01-20 RX ORDER — ACETAMINOPHEN 325 MG/1
650 TABLET ORAL EVERY 6 HOURS PRN
Status: DISCONTINUED | OUTPATIENT
Start: 2020-01-20 | End: 2020-01-27 | Stop reason: HOSPADM

## 2020-01-20 RX ORDER — KETOROLAC TROMETHAMINE 30 MG/ML
30 INJECTION, SOLUTION INTRAMUSCULAR; INTRAVENOUS EVERY 6 HOURS PRN
Status: DISCONTINUED | OUTPATIENT
Start: 2020-01-20 | End: 2020-01-22

## 2020-01-20 RX ORDER — BISACODYL 10 MG
10 SUPPOSITORY, RECTAL RECTAL
Status: DISCONTINUED | OUTPATIENT
Start: 2020-01-20 | End: 2020-01-23

## 2020-01-20 RX ORDER — GABAPENTIN 600 MG/1
600 TABLET ORAL 4 TIMES DAILY
Status: ON HOLD | COMMUNITY
End: 2020-10-14

## 2020-01-20 RX ORDER — TRIHEXYPHENIDYL HYDROCHLORIDE 2 MG/1
4 TABLET ORAL
Status: DISCONTINUED | OUTPATIENT
Start: 2020-01-20 | End: 2020-01-27 | Stop reason: HOSPADM

## 2020-01-20 RX ORDER — ENALAPRILAT 1.25 MG/ML
1.25 INJECTION INTRAVENOUS EVERY 6 HOURS PRN
Status: DISCONTINUED | OUTPATIENT
Start: 2020-01-20 | End: 2020-01-27 | Stop reason: HOSPADM

## 2020-01-20 RX ADMIN — TRIHEXYPHENIDYL HYDROCHLORIDE 4 MG: 2 TABLET ORAL at 21:05

## 2020-01-20 RX ADMIN — PREDNISONE 60 MG: 20 TABLET ORAL at 11:05

## 2020-01-20 RX ADMIN — HALOPERIDOL 5 MG: 5 TABLET ORAL at 14:12

## 2020-01-20 RX ADMIN — HALOPERIDOL 5 MG: 5 TABLET ORAL at 21:05

## 2020-01-20 RX ADMIN — GABAPENTIN 600 MG: 300 CAPSULE ORAL at 21:05

## 2020-01-20 RX ADMIN — LIDOCAINE 1 PATCH: 50 PATCH TOPICAL at 15:22

## 2020-01-20 RX ADMIN — KETOROLAC TROMETHAMINE 30 MG: 30 INJECTION, SOLUTION INTRAMUSCULAR at 14:12

## 2020-01-20 RX ADMIN — OXYCODONE HYDROCHLORIDE AND ACETAMINOPHEN 1 TABLET: 5; 325 TABLET ORAL at 11:05

## 2020-01-20 RX ADMIN — OXYCODONE HYDROCHLORIDE AND ACETAMINOPHEN 1 TABLET: 5; 325 TABLET ORAL at 21:05

## 2020-01-20 RX ADMIN — GABAPENTIN 600 MG: 300 CAPSULE ORAL at 14:12

## 2020-01-20 RX ADMIN — OXYCODONE HYDROCHLORIDE AND ACETAMINOPHEN 1 TABLET: 5; 325 TABLET ORAL at 15:22

## 2020-01-20 ASSESSMENT — COGNITIVE AND FUNCTIONAL STATUS - GENERAL
MOVING FROM LYING ON BACK TO SITTING ON SIDE OF FLAT BED: A LITTLE
MOVING FROM LYING ON BACK TO SITTING ON SIDE OF FLAT BED: A LITTLE
CLIMB 3 TO 5 STEPS WITH RAILING: TOTAL
DRESSING REGULAR LOWER BODY CLOTHING: A LITTLE
MOBILITY SCORE: 17
CLIMB 3 TO 5 STEPS WITH RAILING: TOTAL
MOBILITY SCORE: 17
SUGGESTED CMS G CODE MODIFIER DAILY ACTIVITY: CI
WALKING IN HOSPITAL ROOM: A LITTLE
SUGGESTED CMS G CODE MODIFIER MOBILITY: CK
WALKING IN HOSPITAL ROOM: A LITTLE
TURNING FROM BACK TO SIDE WHILE IN FLAT BAD: A LOT
DAILY ACTIVITIY SCORE: 23
TURNING FROM BACK TO SIDE WHILE IN FLAT BAD: A LOT
SUGGESTED CMS G CODE MODIFIER MOBILITY: CK

## 2020-01-20 ASSESSMENT — ENCOUNTER SYMPTOMS
SENSORY CHANGE: 1
SPEECH CHANGE: 0
PALPITATIONS: 0
CONSTIPATION: 0
COUGH: 0
NAUSEA: 0
SHORTNESS OF BREATH: 0
STRIDOR: 0
CHILLS: 0
DIARRHEA: 0
FEVER: 0
BACK PAIN: 0
HALLUCINATIONS: 1
ABDOMINAL PAIN: 0
HEADACHES: 0
NERVOUS/ANXIOUS: 0
EYE DISCHARGE: 0

## 2020-01-20 ASSESSMENT — LIFESTYLE VARIABLES
HOW MANY TIMES IN THE PAST YEAR HAVE YOU HAD 5 OR MORE DRINKS IN A DAY: 0
EVER HAD A DRINK FIRST THING IN THE MORNING TO STEADY YOUR NERVES TO GET RID OF A HANGOVER: NO
TOTAL SCORE: 0
TOTAL SCORE: 0
EVER_SMOKED: YES
EVER FELT BAD OR GUILTY ABOUT YOUR DRINKING: NO
SUBSTANCE_ABUSE: 1
TOTAL SCORE: 0
HAVE PEOPLE ANNOYED YOU BY CRITICIZING YOUR DRINKING: NO
ON A TYPICAL DAY WHEN YOU DRINK ALCOHOL HOW MANY DRINKS DO YOU HAVE: 0
DO YOU DRINK ALCOHOL: NO
HAVE YOU EVER FELT YOU SHOULD CUT DOWN ON YOUR DRINKING: NO
DOES PATIENT WANT TO STOP DRINKING: NO
AVERAGE NUMBER OF DAYS PER WEEK YOU HAVE A DRINK CONTAINING ALCOHOL: 0
CONSUMPTION TOTAL: NEGATIVE

## 2020-01-20 NOTE — ED PROVIDER NOTES
ED Provider Note    CHIEF COMPLAINT  Chief Complaint   Patient presents with   • Leg Pain       HPI  Cira Ferguson is a 39 y.o. female who presents with low back pain.  The patient states she was discharged from the hospital in [place] after she presented there with low back pain and urinary incontinence.  She states that she was diagnosed with a lumbar radiculopathy with significant compression on the nerves in her low back.  She states that she was given a bus ticket to [place] to come back for surgical evaluation as the patient has had surgery by Dr. bolton initially in 2011 as well as multiple surgeries subsequently by Dr. French.  The patient states she has severe pain that radiates down her left leg.  She has been urinating but was noted to have urinary retention while admitted in [place].  Her Hillman catheter was removed and she was urinating fine prior to discharge.  The patient states she does have some weakness to her left leg.  She denies fevers.  She states the pain is severe in intensity and worse with any type of movement.    REVIEW OF SYSTEMS  See HPI for further details. All other systems are negative.     PAST MEDICAL HISTORY  Past Medical History:   Diagnosis Date   • DVT (deep venous thrombosis) (HCC) 2013    leg January 2013   • PE (pulmonary thromboembolism) (HCC) 2013   • Bipolar disorder (HCC)    • Cervical cancer (HCC)    • Fall     2010   • Hepatitis C    • Infectious disease     Hep C   • IV drug abuse     Quit 2012 per pt   • Psychiatric disorder     bipolar   • Schizoaffective disorder (HCC)    • Schizophrenia (HCC)        FAMILY HISTORY  [unfilled]    SOCIAL HISTORY  Social History     Socioeconomic History   • Marital status:      Spouse name: Not on file   • Number of children: Not on file   • Years of education: Not on file   • Highest education level: Not on file   Occupational History   • Not on file   Social Needs   • Financial resource strain: Not on file   • Food  insecurity:     Worry: Not on file     Inability: Not on file   • Transportation needs:     Medical: Not on file     Non-medical: Not on file   Tobacco Use   • Smoking status: Current Every Day Smoker     Packs/day: 0.50     Years: 15.00     Pack years: 7.50     Types: Cigarettes   • Smokeless tobacco: Never Used   Substance and Sexual Activity   • Alcohol use: No   • Drug use: No   • Sexual activity: Yes     Partners: Male   Lifestyle   • Physical activity:     Days per week: Not on file     Minutes per session: Not on file   • Stress: Not on file   Relationships   • Social connections:     Talks on phone: Not on file     Gets together: Not on file     Attends [employer] service: Not on file     Active member of club or organization: Not on file     Attends meetings of clubs or organizations: Not on file     Relationship status: Not on file   • Intimate partner violence:     Fear of current or ex partner: Not on file     Emotionally abused: Not on file     Physically abused: Not on file     Forced sexual activity: Not on file   Other Topics Concern   • Not on file   Social History Narrative   • Not on file       SURGICAL HISTORY  Past Surgical History:   Procedure Laterality Date   • VAGINAL HYSTERECTOMY SCOPE TOTAL  10/23/2014    Performed by Levar Trevino M.D. at SURGERY SAME DAY [facility] ORS   • SALPINGECTOMY  10/23/2014    Performed by Levar Trevino M.D. at SURGERY SAME DAY [facility] ORS   • ANTERIOR AND POSTERIOR REPAIR  10/23/2014    Performed by Levar Trevino M.D. at SURGERY SAME DAY [facility] ORS   • ENTEROCELE REPAIR  10/23/2014    Performed by Levar Trevino M.D. at SURGERY SAME DAY [facility] ORS   • VAGINAL SUSPENSION  10/23/2014    Performed by Levar Trevino M.D. at SURGERY SAME DAY [facility] ORS   • BLADDER SLING FEMALE  10/23/2014    Performed by Levar Trveino M.D. at SURGERY SAME DAY [facility] ORS   • CYSTOSCOPY  10/23/2014    Performed by Levar Trevino M.D. at SURGERY SAME DAY [facility]  ORS   • LUMBAR FUSION POSTERIOR  2012    Performed by Lauryn Frecnh M.D. at SURGERY [facility] ORS   • LUMBAR DECOMPRESSION  2012    Performed by Lauryn French M.D. at SURGERY [facility] ORS   • LUMBAR LAMINECTOMY DISKECTOMY  11/10/2011    Performed by LAURYN FRENCH at SURGERY [facility] ORS   • LUMBAR LAMINECTOMY DISKECTOMY  2011    Performed by RAVI MONTENEGRO at SURGERY [facility] ORS   • GYN SURGERY      abnormal PAP smears   • PRIMARY C SECTION      tubal ligation, , fallopian tube (one jewels)       CURRENT MEDICATIONS  Home Medications     Reviewed by Nina Moody R.N. (Registered Nurse) on 20 at 0837  Med List Status: Not Addressed   Medication Last Dose Status   haloperidol (HALDOL) 5 MG Tab  Active   oxycodone immediate release (ROXICODONE) 10 MG immediate release tablet  Active                ALLERGIES  Allergies   Allergen Reactions   • Contrast Media With Iodine [Iodine]    • Dilaudid [Hydromorphone Hcl] Hives   • Sulfa Drugs        PHYSICAL EXAM  VITAL SIGNS: /77   Pulse 83   Temp 36.3 °C (97.3 °F) (Temporal)   Resp 17   Ht 1.829 m (6')   Wt (!) 127 kg (280 lb)   LMP 2015   SpO2 100%   BMI 37.97 kg/m²       Constitutional: Well developed, Well nourished, No acute distress, Non-toxic appearance.   HENT: Normocephalic, Atraumatic, Bilateral external ears normal, Oropharynx moist, No oral exudates, Nose normal.   Eyes: PERRLA, EOMI, Conjunctiva normal, No discharge.   Neck: Normal range of motion, No tenderness, Supple, No stridor.   Lymphatic: No lymphadenopathy noted.   Cardiovascular: Normal heart rate, Normal rhythm, No murmurs, No rubs, No gallops.   Thorax & Lungs: Normal breath sounds, No respiratory distress, No wheezing, No chest tenderness.   Abdomen: Bowel sounds normal, Soft, No tenderness, No masses, No pulsatile masses.   Skin: Warm, Dry, No erythema, No rash.   Back: No tenderness, No CVA tenderness.   Extremities: Intact  distal pulses, No edema, No tenderness, No cyanosis, No clubbing.   Neurologic: Alert & oriented x 3, 3 out of 5 strength to the left lower extremity with extension and flexion.  However her exam is not consistent as is able to get the patient to stand up and squat down without any difficulty however when she is at a sitting position she has 3 out of 5 strength as described above., objective numbness to the left leg  Psychiatric: Affect normal, Judgment normal, Mood normal.     COURSE & MEDICAL DECISION MAKING  Pertinent Labs & Imaging studies reviewed. (See chart for details)  This a 39-year-old female who presents the emergency department with a lumbar radiculopathy.  She does have some weakness in the left leg but as mentioned above her exam is inconsistent.  I did review her records from the hospital in [place].  The patient has a left paracentral disc extrusion in the L3-4 region with significant spinal canal stenosis and effacement of the left lateral recess and this could be causing her presenting symptoms.  She also has moderate stenosis at the right L3-L4 and left L4-5 and bilateral L5-S1 region.  She also has a pedicle screw at L5 that is abutting the transverse nerve roots.  All these could be surgical and therefore I did speak with the neurosurgeon.  He recommends admission to the hospitalist and to get an MRI here at [facility] that he can actually review to see if the patient would be a surgical candidate.  The patient did receive Percocet and prednisone orally as initially as no treat the patient on an outpatient basis.  However with these MRI findings will admit the patient for an MRI and neurosurgical consultation.    FINAL IMPRESSION  1.  Lumbar radiculopathy    Disposition  The patient will be admitted in stable condition      Electronically signed by: Prince Cameron M.D., 1/20/2020 12:11 PM

## 2020-01-20 NOTE — ED TRIAGE NOTES
Pt presents via EMS with complaints of LLE pain with associated loss of bowel and bladder. States numbness from outer thigh to groin and down leg. Incontinence started today but leg pain and burning has been going on for a month. Pt has extensive lower back hx. Pt says she was told she is supposed to be in a wheel chair although she chooses not to and can ambulate. Alert pwd   Past Medical History:   Diagnosis Date   • Bipolar disorder (HCC)    • Cervical cancer (HCC)    • DVT (deep venous thrombosis) (HCC) 2013    leg January 2013   • Fall 2010   • Hepatitis C    • Infectious disease     Hep C   • IV drug abuse     Quit 2012 per pt   • PE (pulmonary thromboembolism) (HCC) 2013   • Psychiatric disorder     bipolar   • Schizoaffective disorder (HCC)    • Schizophrenia (HCC)

## 2020-01-20 NOTE — ED NOTES
Med rec complete per pt's rx bottles at bedside- reviewed and returned to pt. Pt agrees to not take own medications during admission. Allergies reviewed. No ABX taken in the last 2 weeks.

## 2020-01-21 ENCOUNTER — APPOINTMENT (OUTPATIENT)
Dept: RADIOLOGY | Facility: MEDICAL CENTER | Age: 40
End: 2020-01-21
Attending: NEUROLOGICAL SURGERY
Payer: MEDICAID

## 2020-01-21 PROCEDURE — 700102 HCHG RX REV CODE 250 W/ 637 OVERRIDE(OP): Performed by: HOSPITALIST

## 2020-01-21 PROCEDURE — 99226 PR SUBSEQUENT OBSERVATION CARE,LEVEL III: CPT | Performed by: INTERNAL MEDICINE

## 2020-01-21 PROCEDURE — A9270 NON-COVERED ITEM OR SERVICE: HCPCS | Performed by: INTERNAL MEDICINE

## 2020-01-21 PROCEDURE — 700102 HCHG RX REV CODE 250 W/ 637 OVERRIDE(OP): Performed by: INTERNAL MEDICINE

## 2020-01-21 PROCEDURE — A9270 NON-COVERED ITEM OR SERVICE: HCPCS | Performed by: HOSPITALIST

## 2020-01-21 PROCEDURE — 700111 HCHG RX REV CODE 636 W/ 250 OVERRIDE (IP): Performed by: HOSPITALIST

## 2020-01-21 PROCEDURE — 72148 MRI LUMBAR SPINE W/O DYE: CPT

## 2020-01-21 PROCEDURE — 700111 HCHG RX REV CODE 636 W/ 250 OVERRIDE (IP): Performed by: EMERGENCY MEDICINE

## 2020-01-21 PROCEDURE — 96376 TX/PRO/DX INJ SAME DRUG ADON: CPT

## 2020-01-21 PROCEDURE — G0378 HOSPITAL OBSERVATION PER HR: HCPCS

## 2020-01-21 RX ORDER — LORAZEPAM 1 MG/1
1 TABLET ORAL
Status: COMPLETED | OUTPATIENT
Start: 2020-01-21 | End: 2020-01-21

## 2020-01-21 RX ORDER — NICOTINE 21 MG/24HR
21 PATCH, TRANSDERMAL 24 HOURS TRANSDERMAL
Status: DISCONTINUED | OUTPATIENT
Start: 2020-01-21 | End: 2020-01-27 | Stop reason: HOSPADM

## 2020-01-21 RX ADMIN — HALOPERIDOL 5 MG: 5 TABLET ORAL at 13:43

## 2020-01-21 RX ADMIN — OXYCODONE HYDROCHLORIDE AND ACETAMINOPHEN 1 TABLET: 5; 325 TABLET ORAL at 11:42

## 2020-01-21 RX ADMIN — GABAPENTIN 600 MG: 300 CAPSULE ORAL at 05:31

## 2020-01-21 RX ADMIN — NICOTINE TRANSDERMAL SYSTEM 21 MG: 21 PATCH, EXTENDED RELEASE TRANSDERMAL at 11:42

## 2020-01-21 RX ADMIN — GABAPENTIN 600 MG: 300 CAPSULE ORAL at 13:43

## 2020-01-21 RX ADMIN — HALOPERIDOL 5 MG: 5 TABLET ORAL at 20:37

## 2020-01-21 RX ADMIN — OXYCODONE HYDROCHLORIDE AND ACETAMINOPHEN 1 TABLET: 5; 325 TABLET ORAL at 01:26

## 2020-01-21 RX ADMIN — PREDNISONE 60 MG: 20 TABLET ORAL at 05:31

## 2020-01-21 RX ADMIN — OXYCODONE HYDROCHLORIDE AND ACETAMINOPHEN 1 TABLET: 5; 325 TABLET ORAL at 17:47

## 2020-01-21 RX ADMIN — KETOROLAC TROMETHAMINE 30 MG: 30 INJECTION, SOLUTION INTRAMUSCULAR at 20:37

## 2020-01-21 RX ADMIN — HALOPERIDOL 5 MG: 5 TABLET ORAL at 05:31

## 2020-01-21 RX ADMIN — TRIHEXYPHENIDYL HYDROCHLORIDE 4 MG: 2 TABLET ORAL at 20:37

## 2020-01-21 RX ADMIN — GABAPENTIN 600 MG: 300 CAPSULE ORAL at 20:37

## 2020-01-21 RX ADMIN — OXYCODONE HYDROCHLORIDE AND ACETAMINOPHEN 1 TABLET: 5; 325 TABLET ORAL at 05:47

## 2020-01-21 RX ADMIN — LORAZEPAM 1 MG: 1 TABLET ORAL at 11:40

## 2020-01-21 ASSESSMENT — ENCOUNTER SYMPTOMS
CONSTIPATION: 0
FEVER: 0
SORE THROAT: 0
CHILLS: 0
TINGLING: 1
NAUSEA: 0
HEMOPTYSIS: 0
BLOOD IN STOOL: 0
DIARRHEA: 0
HEADACHES: 0
BLURRED VISION: 0
COUGH: 0
SINUS PAIN: 0
VOMITING: 0
BACK PAIN: 1
LOSS OF CONSCIOUSNESS: 0
WEAKNESS: 0
WHEEZING: 0
SPUTUM PRODUCTION: 0
ABDOMINAL PAIN: 0
SHORTNESS OF BREATH: 0
DEPRESSION: 0
SEIZURES: 0
FALLS: 0
NERVOUS/ANXIOUS: 0
PALPITATIONS: 0
DIZZINESS: 0

## 2020-01-21 ASSESSMENT — PATIENT HEALTH QUESTIONNAIRE - PHQ9
SUM OF ALL RESPONSES TO PHQ9 QUESTIONS 1 AND 2: 0
1. LITTLE INTEREST OR PLEASURE IN DOING THINGS: NOT AT ALL
2. FEELING DOWN, DEPRESSED, IRRITABLE, OR HOPELESS: NOT AT ALL

## 2020-01-21 NOTE — PROGRESS NOTES
Patient resting in bed during change of shift.  Report received from nightshift RN, updated on plan of care, will discuss with patient one awake.  Pt up self, no use of strip alarm, using call light appropriately overnight, hourly rounding in place for the time being.

## 2020-01-21 NOTE — H&P
"Hospital Medicine History & Physical Note    Date of Service  1/20/2020    Primary Care Physician  Laz Muller, MARÍA.    Consultants  Neurosurgery    Code Status  Full    Chief Complaint  Acute on chronic back pain    History of Presenting Illness  This is a obese 39 y.o. female with a history of schizoaffective disorder, chronic back pain, hepatitis C, prior cervical cancer, active tobacco use, and history of methamphetamine abuse, who presented 1/20/2020 with ongoing back pain.  She states back in December going to a \"journey concert\" and dancing all night and then subsequently having severe back pain with pain and numbness rating down her left leg.  She was admitted to a hospital in [place] when this occurred she had work-up but no surgery was recommended by neurosurgery there.  Per records the [place] MRI showed left paracentral disc protrusion at L3-L4 with severe spinal canal stenosis and effacement of the left lateral recess.  There was moderate stenosis of the right L3-L4, left L4-L5, and bilateral L5- S1 neural foramina.  The right pedicle screw at L5 traverses the spinal canal resulting in mass-effect at the thecal sac and abuts the transversing nerve roots.  Surgical changes at L5 laminectomy with L5-S1 posterior fusion.    I have been requested to admit the patient for neurosurgery for further potential MRI imaging here for potential surgery per Dr. Garcia.  Currently the patient is alert and awake.  She states that she chronically has visual/auditory hallucinations of \"shadow people.\"  She is able to talk in full sentences she is alert aware she is at and oriented to the date.  Here in the emergency room she has been given Decadron and pain meds.    Review of Systems  Review of Systems   Constitutional: Negative for chills and fever.   HENT: Negative for congestion.    Eyes: Negative for discharge.   Respiratory: Negative for cough, shortness of breath and stridor.    Cardiovascular: " Negative for chest pain, palpitations and leg swelling.   Gastrointestinal: Negative for abdominal pain, constipation, diarrhea and nausea.   Genitourinary: Negative for dysuria and hematuria.   Musculoskeletal: Negative for back pain and joint pain.   Skin: Negative for rash.   Neurological: Positive for sensory change (left leg). Negative for speech change and headaches.   Psychiatric/Behavioral: Positive for hallucinations and substance abuse. The patient is not nervous/anxious.        Past Medical History   has a past medical history of Bipolar disorder ([facility]), Cervical cancer ([facility]), DVT (deep venous thrombosis) ([facility]) (), Fall, Hepatitis C, Infectious disease, IV drug abuse, PE (pulmonary thromboembolism) ([facility]) (), Psychiatric disorder, Schizoaffective disorder ([facility]), and Schizophrenia ([facility]).    Surgical History   has a past surgical history that includes lumbar laminectomy diskectomy (2011); gyn surgery; primary c section; lumbar laminectomy diskectomy (11/10/2011); lumbar fusion posterior (2012); lumbar decompression (2012); vaginal hysterectomy scope total (10/23/2014); salpingectomy (10/23/2014); anterior and posterior repair (10/23/2014); enterocele repair (10/23/2014); vaginal suspension (10/23/2014); bladder sling female (10/23/2014); and cystoscopy (10/23/2014).     Family History  family history includes Alcohol/Drug in her father; Diabetes in her mother; Heart Disease in her mother; Lung Disease in her mother.  States her father just  2 weeks ago from pancreatitis.  States her mother is alive and well    Social History   reports that she has been smoking cigarettes. She has a 7.50 pack-year smoking history. She has never used smokeless tobacco. She reports that she does not drink alcohol or use drugs.  States she has had 2 children.      Allergies  Allergies   Allergen Reactions   • Contrast Media With Iodine [Iodine]    • Hydromorphone Hives and Itching   • Sulfa Drugs       Unknown rxn       Medications  Prior to Admission Medications   Prescriptions Last Dose Informant Patient Reported? Taking?   gabapentin (NEURONTIN) 600 MG tablet 1.5wksago at Unknown time  Yes Yes   Sig: Take 600 mg by mouth 3 times a day.   haloperidol (HALDOL) 5 MG Tab 1/20/2020 at 0630  Yes Yes   Sig: Take 5 mg by mouth 3 times a day.   trihexyphenidyl (ARTANE) 2 MG Tab 1/19/2020 at hs  Yes Yes   Sig: Take 4 mg by mouth every bedtime.      Facility-Administered Medications: None       Physical Exam  Temp:  [36.3 °C (97.3 °F)] 36.3 °C (97.3 °F)  Pulse:  [77-94] 82  Resp:  [16-17] 16  BP: (108-147)/(72-87) 114/72  SpO2:  [95 %-100 %] 96 %    Physical Exam  Vitals signs reviewed.   Constitutional:       Appearance: Normal appearance. She is not diaphoretic.   HENT:      Head: Normocephalic and atraumatic.      Nose: Nose normal.      Mouth/Throat:      Mouth: Mucous membranes are moist.      Pharynx: No oropharyngeal exudate.   Eyes:      General: No scleral icterus.        Right eye: No discharge.         Left eye: No discharge.      Extraocular Movements: Extraocular movements intact.      Conjunctiva/sclera: Conjunctivae normal.      Pupils: Pupils are equal, round, and reactive to light.   Neck:      Musculoskeletal: No muscular tenderness.      Vascular: No carotid bruit.   Cardiovascular:      Rate and Rhythm: Normal rate and regular rhythm.      Pulses:           Radial pulses are 2+ on the right side and 2+ on the left side.        Dorsalis pedis pulses are 2+ on the right side and 2+ on the left side.      Heart sounds: No murmur.   Pulmonary:      Effort: Pulmonary effort is normal. No respiratory distress.      Breath sounds: Normal breath sounds. No wheezing or rales.   Abdominal:      General: Bowel sounds are normal. There is no distension.      Palpations: Abdomen is soft.   Musculoskeletal:         General: No swelling or tenderness.   Lymphadenopathy:      Cervical: No cervical adenopathy.    Skin:     Coloration: Skin is not jaundiced or pale.   Neurological:      General: No focal deficit present.      Mental Status: She is alert and oriented to person, place, and time. Mental status is at baseline.      Cranial Nerves: No cranial nerve deficit.   Psychiatric:         Mood and Affect: Mood normal.         Behavior: Behavior normal.         Laboratory:          No results for input(s): ALTSGPT, ASTSGOT, ALKPHOSPHAT, TBILIRUBIN, DBILIRUBIN, GAMMAGT, AMYLASE, LIPASE, ALB, PREALBUMIN, GLUCOSE in the last 72 hours.      No results for input(s): NTPROBNP in the last 72 hours.      No results for input(s): TROPONINT in the last 72 hours.    Urinalysis:    No results found     Imaging:  OUTSIDE IMAGES-CT LUMBAR SPINE   Final Result            Assessment/Plan:  I anticipate this patient is appropriate for observation status at this time.    * Lumbar nerve root compression- (present on admission)  Assessment & Plan  MRI lumbar spine ordered by Dr. Garcia  Gabapentin 600 mg 3 times daily, PRN Toradol and Ultram    Chronic hepatitis C without hepatic coma (HCC)  Assessment & Plan  Noted per history    Tobacco dependence- (present on admission)  Assessment & Plan  Tobacco abuse  Nicotine patch if so required only if requested    Schizoaffective disorder (HCC)- (present on admission)  Assessment & Plan  Continue Artane  Consider psychology consult    History of DVT (deep vein thrombosis)- (present on admission)  Assessment & Plan  Previous history and not on anticoagulation at this time  DVT prophylaxis with Lovenox    Back pain- (present on admission)  Assessment & Plan  Acute on chronic  Neurosurgery consulting  Pain management  As needed Toradol and Percocet  On prednisone 60 mg daily      VTE prophylaxis: Lovenox

## 2020-01-21 NOTE — ASSESSMENT & PLAN NOTE
status post bilateral L3-L4 partial laminectomies, left   medial facetectomy, microscopic excision of herniated disk, decompression of   thecal sac and left L4 nerve root on 1/24  Lower back pain is improving  On prednisone 60 mg daily; will taper down  PCA pump discontinued, transition to oral oxycodone as needed, continue methocarbamol, Flexeril  [facility]iqui placement for acute rehab

## 2020-01-21 NOTE — CARE PLAN
Problem: Pain Management  Goal: Pain level will decrease to patient's comfort goal  Outcome: PROGRESSING AS EXPECTED  Flowsheets (Taken 1/21/2020 0049)  Non Verbal Scale : Calm  Pain Rating Scale (NPRS): 10  PRN pain med given to alleviate pt. Complaint of pain.      Problem: Venous Thromboembolism (VTW)/Deep Vein Thrombosis (DVT) Prevention:  Goal: Patient will participate in Venous Thrombosis (VTE)/Deep Vein Thrombosis (DVT)Prevention Measures  Outcome: PROGRESSING AS EXPECTED  Flowsheets (Taken 1/21/2020 0049)  Pharmacologic Prophylaxis Used: LMWH: Enoxaparin(Lovenox)     Problem: Knowledge Deficit  Goal: Knowledge of disease process/condition, treatment plan, diagnostic tests, and medications will improve  Outcome: PROGRESSING AS EXPECTED   POC discussed with the pt. Informed of pending MRI of lumbar spine. MRI screening form completed.

## 2020-01-21 NOTE — CONSULTS
DATE OF SERVICE:  01/20/2020    SURGICAL CONSULTATION    REFERRING PHYSICIAN:  [facility] hospitalist service.    HISTORY OF PRESENT ILLNESS:  This woman who apparently been in the hospital   for over a week in [place] with radicular pain that has been going on for   about a month.  She told me that surgeons would not operate on her because she   did not have insurance, so they stuck her on a bus and transferred her to [place].    This woman who I have operated in the past as well as Dr. French.  She has   got some chronic numbness in her right leg, which is unchanged she states with   a new pain in the left leg, which is 4-5 pattern, she has got some new   numbness there.  She is ambulatory.  She is voiding.  We have a report from   MRI in [place], which does mention stenosis.  However, the study was not   transferred with her.    REVIEW OF SYSTEMS:  No history of bleeding disorder.  She did have a DVT in   the past, but is no longer on anticoagulants.      PHYSICAL EXAMINATION:  VITAL SIGNS:  Recorded.  GENERAL:  Patient is awake, alert, conversant.  She has no distress.  NEUROLOGIC:  Patient is overweight.  She has diffuse giveaway weakness, but   can generate at least 4-4+/5 power.  Trace reflexes.  No abnormality in motor   tone.    IMPRESSION:  Lumbar stenosis by history.    PLAN:  Patient is  being admitted for pain control.  We need to get a new   lumbar MRI.  Patient has significant stenosis, we will take care of it during   this hospitalization.       ____________________________________     MD JANES PRADHAN / YENNI    DD:  01/20/2020 13:39:31  DT:  01/20/2020 16:18:29    D#:  [id number]  Job#:  [id number]

## 2020-01-21 NOTE — DISCHARGE PLANNING
Patient is eligible for Medicaid Meds to Beds at discharge if they have coverage with [place] Medicaid, Medicaid FFS, Medicaid HMO ([facility]), or [place]. This service is provided through the [facility] if orders are received by the pharmacy prior to 4pm Monday through Friday excluding holidays. Preferred pharmacy has been changed to [facility]. Please call [phone] prior to discharge.

## 2020-01-21 NOTE — PROGRESS NOTES
Neurosurgery Progress Note    Subjective:  States LLE pain, numbness, weakness.   AwaitingMRI    Exam:  RLE intact, LLE giveaway throughout at least 4-4+/5.     BP  Min: 101/63  Max: 147/87  Pulse  Av.6  Min: 73  Max: 87  Resp  Av  Min: 16  Max: 18  Temp  Av.8 °C (98.2 °F)  Min: 36.7 °C (98 °F)  Max: 37.1 °C (98.7 °F)  SpO2  Av.1 %  Min: 90 %  Max: 100 %    No data recorded    Recent Labs     20  0843   WBC 8.0   RBC 4.86   HEMOGLOBIN 14.6   HEMATOCRIT 44.4   MCV 91.4   MCH 30.0   MCHC 32.9*   RDW 39.8   PLATELETCT 315   MPV 10.0     Recent Labs     20  0843   SODIUM 137   POTASSIUM 4.1   CHLORIDE 106   CO2 24   GLUCOSE 72   BUN 8   CREATININE 0.95   CALCIUM 9.2               Intake/Output       20 0700 - 20 0659 20 0700 - 20 0659      [id number] 5441-9808 Total [id number] 1407-8299 Total       Intake    P.O.  --  340 340  120  -- 120    P.O. -- 340 340 120 -- 120    Total Intake -- 340 340 120 -- 120       Output    Urine  --  -- --  --  -- --    Number of Times Voided -- 2 x 2 x -- -- --    Total Output -- -- -- -- -- --       Net I/O     -- 340 340 120 -- 120            Intake/Output Summary (Last 24 hours) at 2020 0917  Last data filed at 2020 0746  Gross per 24 hour   Intake 460 ml   Output --   Net 460 ml            • predniSONE  60 mg DAILY   • gabapentin  600 mg TID   • haloperidol  5 mg TID   • trihexyphenidyl  4 mg QHS   • senna-docusate  2 Tab BID    And   • polyethylene glycol/lytes  1 Packet QDAY PRN    And   • magnesium hydroxide  30 mL QDAY PRN    And   • bisacodyl  10 mg QDAY PRN   • enoxaparin  40 mg DAILY   • acetaminophen  650 mg Q6HRS PRN   • enalaprilat  1.25 mg Q6HRS PRN   • ondansetron  4 mg Q4HRS PRN   • ondansetron  4 mg Q4HRS PRN   • promethazine  12.5-25 mg Q4HRS PRN   • promethazine  12.5-25 mg Q4HRS PRN   • prochlorperazine  5-10 mg Q4HRS PRN   • ketorolac  30 mg Q6HRS PRN   • lidocaine  1 Patch Q24HR   •  oxyCODONE-acetaminophen  1 Tab Q4HRS PRN       Assessment and Plan:  Hospital day #2 Lumbar radiculopathy  Prophylactic anticoagulation: no         Start date/time: tbd    Await lumbar MRI/CT  Continue pain control

## 2020-01-21 NOTE — PROGRESS NOTES
Pt. Received in bed awake, orientedx4. With complaints of mod to severe pain PRN pain med on board. BLE numbness at times with some tingling on L leg otherwise able to ambulate with standby assistance. PIV intact on saline lock. Educated about fall risks and precautions, pt. Calls appropriately prior to getting out of bed. Other due meds given. Needs attended.

## 2020-01-21 NOTE — ASSESSMENT & PLAN NOTE
MRI lumbar spine shows malpositioned right L5 pedicle screw/solid L5/S1 fusion.  Status post Microscopic bilateral L3-L4 partial laminectomies, left medial facetectomy, microscopic excision of herniated disk, decompression of thecal sac and left L4 nerve root by Dr. Garcia 1/23/2020  Gabapentin 600 mg 3 times daily  Methocarbamol, Flexeril, Valium  oxycodone as needed, status post PCA pump, discontinued 1/24

## 2020-01-21 NOTE — ASSESSMENT & PLAN NOTE
History of right popliteal vein DVT in 2012  Not on anticoagulation as outpatient  Currently off heparin.  Restart DVT prophylaxis when okay per surgery

## 2020-01-21 NOTE — ASSESSMENT & PLAN NOTE
Appears to be stable.   Continue trihexyphenidyl  Scheduled Haldol  Monitor  Follow-up with mental health as outpatient

## 2020-01-21 NOTE — PROGRESS NOTES
2 RN skin check done with Dasha.  Old surgical scar on lumbar spine and midline abdomen, scar at old PEG tube site.  SKin otherwise unremarkable.  No breakdown, lesions or abnormalities to note

## 2020-01-22 ENCOUNTER — APPOINTMENT (OUTPATIENT)
Dept: RADIOLOGY | Facility: MEDICAL CENTER | Age: 40
End: 2020-01-22
Attending: NURSE PRACTITIONER
Payer: MEDICAID

## 2020-01-22 LAB
ANION GAP SERPL CALC-SCNC: 9 MMOL/L (ref 0–11.9)
BASOPHILS # BLD AUTO: 0.3 % (ref 0–1.8)
BASOPHILS # BLD: 0.03 K/UL (ref 0–0.12)
BUN SERPL-MCNC: 16 MG/DL (ref 8–22)
CALCIUM SERPL-MCNC: 9.6 MG/DL (ref 8.5–10.5)
CHLORIDE SERPL-SCNC: 106 MMOL/L (ref 96–112)
CO2 SERPL-SCNC: 24 MMOL/L (ref 20–33)
CREAT SERPL-MCNC: 0.98 MG/DL (ref 0.5–1.4)
EKG IMPRESSION: NORMAL
EOSINOPHIL # BLD AUTO: 0 K/UL (ref 0–0.51)
EOSINOPHIL NFR BLD: 0 % (ref 0–6.9)
ERYTHROCYTE [DISTWIDTH] IN BLOOD BY AUTOMATED COUNT: 41.8 FL (ref 35.9–50)
GLUCOSE SERPL-MCNC: 104 MG/DL (ref 65–99)
HCT VFR BLD AUTO: 45.2 % (ref 37–47)
HGB BLD-MCNC: 14.5 G/DL (ref 12–16)
IMM GRANULOCYTES # BLD AUTO: 0.05 K/UL (ref 0–0.11)
IMM GRANULOCYTES NFR BLD AUTO: 0.5 % (ref 0–0.9)
LYMPHOCYTES # BLD AUTO: 1.67 K/UL (ref 1–4.8)
LYMPHOCYTES NFR BLD: 17.9 % (ref 22–41)
MAGNESIUM SERPL-MCNC: 2 MG/DL (ref 1.5–2.5)
MCH RBC QN AUTO: 29.8 PG (ref 27–33)
MCHC RBC AUTO-ENTMCNC: 32.1 G/DL (ref 33.6–35)
MCV RBC AUTO: 93 FL (ref 81.4–97.8)
MONOCYTES # BLD AUTO: 0.11 K/UL (ref 0–0.85)
MONOCYTES NFR BLD AUTO: 1.2 % (ref 0–13.4)
NEUTROPHILS # BLD AUTO: 7.49 K/UL (ref 2–7.15)
NEUTROPHILS NFR BLD: 80.1 % (ref 44–72)
NRBC # BLD AUTO: 0 K/UL
NRBC BLD-RTO: 0 /100 WBC
PHOSPHATE SERPL-MCNC: 1.9 MG/DL (ref 2.5–4.5)
PLATELET # BLD AUTO: 264 K/UL (ref 164–446)
PMV BLD AUTO: 9.4 FL (ref 9–12.9)
POTASSIUM SERPL-SCNC: 4.1 MMOL/L (ref 3.6–5.5)
RBC # BLD AUTO: 4.86 M/UL (ref 4.2–5.4)
SODIUM SERPL-SCNC: 139 MMOL/L (ref 135–145)
WBC # BLD AUTO: 9.4 K/UL (ref 4.8–10.8)

## 2020-01-22 PROCEDURE — 71046 X-RAY EXAM CHEST 2 VIEWS: CPT

## 2020-01-22 PROCEDURE — 83735 ASSAY OF MAGNESIUM: CPT

## 2020-01-22 PROCEDURE — 85025 COMPLETE CBC W/AUTO DIFF WBC: CPT

## 2020-01-22 PROCEDURE — 700111 HCHG RX REV CODE 636 W/ 250 OVERRIDE (IP): Performed by: EMERGENCY MEDICINE

## 2020-01-22 PROCEDURE — 36415 COLL VENOUS BLD VENIPUNCTURE: CPT

## 2020-01-22 PROCEDURE — 700101 HCHG RX REV CODE 250: Performed by: HOSPITALIST

## 2020-01-22 PROCEDURE — 700102 HCHG RX REV CODE 250 W/ 637 OVERRIDE(OP): Performed by: INTERNAL MEDICINE

## 2020-01-22 PROCEDURE — 99226 PR SUBSEQUENT OBSERVATION CARE,LEVEL III: CPT | Performed by: INTERNAL MEDICINE

## 2020-01-22 PROCEDURE — 84100 ASSAY OF PHOSPHORUS: CPT

## 2020-01-22 PROCEDURE — A9270 NON-COVERED ITEM OR SERVICE: HCPCS | Performed by: INTERNAL MEDICINE

## 2020-01-22 PROCEDURE — 700102 HCHG RX REV CODE 250 W/ 637 OVERRIDE(OP): Performed by: HOSPITALIST

## 2020-01-22 PROCEDURE — 90471 IMMUNIZATION ADMIN: CPT

## 2020-01-22 PROCEDURE — A9270 NON-COVERED ITEM OR SERVICE: HCPCS | Performed by: HOSPITALIST

## 2020-01-22 PROCEDURE — 80048 BASIC METABOLIC PNL TOTAL CA: CPT

## 2020-01-22 PROCEDURE — 90686 IIV4 VACC NO PRSV 0.5 ML IM: CPT | Performed by: INTERNAL MEDICINE

## 2020-01-22 PROCEDURE — G0378 HOSPITAL OBSERVATION PER HR: HCPCS

## 2020-01-22 PROCEDURE — 93005 ELECTROCARDIOGRAM TRACING: CPT | Performed by: NURSE PRACTITIONER

## 2020-01-22 PROCEDURE — 93010 ELECTROCARDIOGRAM REPORT: CPT | Performed by: INTERNAL MEDICINE

## 2020-01-22 PROCEDURE — 700111 HCHG RX REV CODE 636 W/ 250 OVERRIDE (IP): Performed by: INTERNAL MEDICINE

## 2020-01-22 PROCEDURE — 700101 HCHG RX REV CODE 250: Performed by: NURSE PRACTITIONER

## 2020-01-22 RX ORDER — SODIUM CHLORIDE AND POTASSIUM CHLORIDE 150; 900 MG/100ML; MG/100ML
INJECTION, SOLUTION INTRAVENOUS CONTINUOUS
Status: DISCONTINUED | OUTPATIENT
Start: 2020-01-22 | End: 2020-01-22

## 2020-01-22 RX ADMIN — PREDNISONE 60 MG: 20 TABLET ORAL at 05:06

## 2020-01-22 RX ADMIN — HALOPERIDOL 5 MG: 5 TABLET ORAL at 05:06

## 2020-01-22 RX ADMIN — DIBASIC SODIUM PHOSPHATE, MONOBASIC POTASSIUM PHOSPHATE AND MONOBASIC SODIUM PHOSPHATE 2 TABLET: 852; 155; 130 TABLET ORAL at 18:08

## 2020-01-22 RX ADMIN — LIDOCAINE 1 PATCH: 50 PATCH TOPICAL at 13:32

## 2020-01-22 RX ADMIN — POTASSIUM CHLORIDE AND SODIUM CHLORIDE: 900; 150 INJECTION, SOLUTION INTRAVENOUS at 09:35

## 2020-01-22 RX ADMIN — HALOPERIDOL 5 MG: 5 TABLET ORAL at 13:32

## 2020-01-22 RX ADMIN — TRIHEXYPHENIDYL HYDROCHLORIDE 4 MG: 2 TABLET ORAL at 22:14

## 2020-01-22 RX ADMIN — HALOPERIDOL 5 MG: 5 TABLET ORAL at 20:32

## 2020-01-22 RX ADMIN — GABAPENTIN 600 MG: 300 CAPSULE ORAL at 05:06

## 2020-01-22 RX ADMIN — NICOTINE TRANSDERMAL SYSTEM 21 MG: 21 PATCH, EXTENDED RELEASE TRANSDERMAL at 05:05

## 2020-01-22 RX ADMIN — OXYCODONE HYDROCHLORIDE AND ACETAMINOPHEN 1 TABLET: 5; 325 TABLET ORAL at 20:34

## 2020-01-22 RX ADMIN — GABAPENTIN 600 MG: 300 CAPSULE ORAL at 13:32

## 2020-01-22 RX ADMIN — OXYCODONE HYDROCHLORIDE AND ACETAMINOPHEN 1 TABLET: 5; 325 TABLET ORAL at 11:31

## 2020-01-22 RX ADMIN — INFLUENZA A VIRUS A/BRISBANE/02/2018 IVR-190 (H1N1) ANTIGEN (FORMALDEHYDE INACTIVATED), INFLUENZA A VIRUS A/KANSAS/14/2017 X-327 (H3N2) ANTIGEN (FORMALDEHYDE INACTIVATED), INFLUENZA B VIRUS B/PHUKET/3073/2013 ANTIGEN (FORMALDEHYDE INACTIVATED), AND INFLUENZA B VIRUS B/MARYLAND/15/2016 BX-69A ANTIGEN (FORMALDEHYDE INACTIVATED) 0.5 ML: 15; 15; 15; 15 INJECTION, SUSPENSION INTRAMUSCULAR at 20:42

## 2020-01-22 RX ADMIN — GABAPENTIN 600 MG: 300 CAPSULE ORAL at 20:31

## 2020-01-22 RX ADMIN — OXYCODONE HYDROCHLORIDE AND ACETAMINOPHEN 1 TABLET: 5; 325 TABLET ORAL at 07:23

## 2020-01-22 RX ADMIN — OXYCODONE HYDROCHLORIDE AND ACETAMINOPHEN 1 TABLET: 5; 325 TABLET ORAL at 15:40

## 2020-01-22 RX ADMIN — DIBASIC SODIUM PHOSPHATE, MONOBASIC POTASSIUM PHOSPHATE AND MONOBASIC SODIUM PHOSPHATE 2 TABLET: 852; 155; 130 TABLET ORAL at 14:49

## 2020-01-22 ASSESSMENT — ENCOUNTER SYMPTOMS
SINUS PAIN: 0
COUGH: 0
VOMITING: 0
SHORTNESS OF BREATH: 0
SPUTUM PRODUCTION: 0
DEPRESSION: 0
CONSTIPATION: 0
DIARRHEA: 0
BACK PAIN: 1
LOSS OF CONSCIOUSNESS: 0
NAUSEA: 0
TINGLING: 1
FALLS: 0
NERVOUS/ANXIOUS: 0
SEIZURES: 0
HEMOPTYSIS: 0
PALPITATIONS: 0
WEAKNESS: 0
FEVER: 0
CHILLS: 0
HEADACHES: 0
SORE THROAT: 0
DIZZINESS: 0
ABDOMINAL PAIN: 0
BLOOD IN STOOL: 0
WHEEZING: 0
BLURRED VISION: 0

## 2020-01-22 NOTE — THERAPY
PT consult received. Per chart, plan is for laminectomy/discectomy tomorrow 1/23. Will complete eval post-op as able/appropriate to assess mobility for DC needs.

## 2020-01-22 NOTE — CARE PLAN
Problem: Communication  Goal: The ability to communicate needs accurately and effectively will improve  Outcome: PROGRESSING AS EXPECTED  Note:   Pt remains able to communicate needs accurately and effectively on my shift.     Problem: Safety  Goal: Will remain free from injury  Outcome: PROGRESSING AS EXPECTED  Goal: Will remain free from falls  Outcome: PROGRESSING AS EXPECTED  Note:   Pt has remained free from falls and injury on my shift.     Pt is A&O, VS WNL/baseline, patient skilled for daily nursing care and observation on my shift. Pt has had no s/s of resp/cardiac distress on my shift. Pt planned to be NPO at midnight for procedure tomorrow, pt educated on preop process and NPO status at midnight. Pt had EKG and CXR on my shift (see results). Call bell within reach, safety maintained on my shift.

## 2020-01-22 NOTE — CARE PLAN
Problem: Pain Management  Goal: Pain level will decrease to patient's comfort goal  Outcome: PROGRESSING AS EXPECTED  Flowsheets (Taken 1/21/2020 2228)  Pain Rating Scale (NPRS): 7  PRn pain med given     Problem: Safety  Goal: Will remain free from injury  Outcome: PROGRESSING AS EXPECTED  Goal: Will remain free from falls  Outcome: PROGRESSING AS EXPECTED   Educated about fall risks and precautions.

## 2020-01-22 NOTE — PROGRESS NOTES
Skye ANDRADE called pre op and verbalized that Dr. Garcia wants pt to be in pre op at 0530 tomorrow morning (1/23). I called the current floor RN and asked her to pass message along to night shift for completion of CHG wipes and preoperative checklist prior to 0500 tomorrow morning, RN verbalized understanding

## 2020-01-22 NOTE — PROGRESS NOTES
MRI/CT reviewed.  Large Left L3-4 HNP.  Malpositioned Rt L5 pedicle screw/solid L5-S1 fusion.  Pt tentatively scheduled for L3-4 arceo/discectomy Thursday 7:30 am.

## 2020-01-22 NOTE — PROGRESS NOTES
Neurosurgery Progress Note    Subjective:  States LLE pain, numbness, weakness.   Feels is worsening    Exam:  RLE intact, LLE giveaway throughout at least 4-4+/5.     BP  Min: 100/50  Max: 120/66  Pulse  Av.3  Min: 65  Max: 95  Resp  Av.5  Min: 16  Max: 18  Temp  Av.6 °C (97.9 °F)  Min: 36.1 °C (97 °F)  Max: 37.1 °C (98.7 °F)  SpO2  Av.3 %  Min: 91 %  Max: 97 %    No data recorded    Recent Labs     20  0843 20  0948   WBC 8.0 9.4   RBC 4.86 4.86   HEMOGLOBIN 14.6 14.5   HEMATOCRIT 44.4 45.2   MCV 91.4 93.0   MCH 30.0 29.8   MCHC 32.9* 32.1*   RDW 39.8 41.8   PLATELETCT 315 264   MPV 10.0 9.4     Recent Labs     20  0843   SODIUM 137   POTASSIUM 4.1   CHLORIDE 106   CO2 24   GLUCOSE 72   BUN 8   CREATININE 0.95   CALCIUM 9.2               Intake/Output       20 0700 - 20 0659 20 0700 - 20 0659      [id number] 7894-6360 Total 0994-4475 1468-7996 Total       Intake    P.O.  360  240 600  240  -- 240    P.O. 360 240 600 240 -- 240    Total Intake 360 240 600 240 -- 240       Output    Urine  --  -- --  --  -- --    Number of Times Voided -- -- -- 1 x -- 1 x    Total Output -- -- -- -- -- --       Net I/O     360 240 600 240 -- 240            Intake/Output Summary (Last 24 hours) at 2020 1013  Last data filed at 2020 0834  Gross per 24 hour   Intake 600 ml   Output --   Net 600 ml            • 0.9 % NaCl with KCl 20 mEq 1,000 mL   Continuous   • nicotine  21 mg Daily-0600   • predniSONE  60 mg DAILY   • gabapentin  600 mg TID   • haloperidol  5 mg TID   • trihexyphenidyl  4 mg QHS   • senna-docusate  2 Tab BID    And   • polyethylene glycol/lytes  1 Packet QDAY PRN    And   • magnesium hydroxide  30 mL QDAY PRN    And   • bisacodyl  10 mg QDAY PRN   • acetaminophen  650 mg Q6HRS PRN   • enalaprilat  1.25 mg Q6HRS PRN   • ondansetron  4 mg Q4HRS PRN   • ondansetron  4 mg Q4HRS PRN   • promethazine  12.5-25 mg Q4HRS PRN   • promethazine  12.5-25 mg  Q4HRS PRN   • prochlorperazine  5-10 mg Q4HRS PRN   • lidocaine  1 Patch Q24HR   • oxyCODONE-acetaminophen  1 Tab Q4HRS PRN       Assessment and Plan:  Hospital day #3 Lumbar radiculopathy  Prophylactic anticoagulation: no         Start date/time: tbd    Surgery arian 7 am- see dr schwarz note  Lovenox dcd/toradol dcd  coags in am- cxr, ekg now  Continue pain control  NPO at midnight

## 2020-01-22 NOTE — PROGRESS NOTES
[facility] Medicine Daily Progress Note    Date of Service  1/21/2020    Chief Complaint  39 y.o. female admitted 1/20/2020 with acute on chronic back pain    Hospital Course    Ms. Cira Ferguson is a 39 y.o. female with multiple prior back surgeries who presented with acute on chronic back pain.  Had acute exacerbation of back pain in December after dancing all night at a [employer] concert.  Was seen at hospital in [place] and no surgery was recommended.  On admission to [facility] she reports her back pain radiates down bilateral lower extremities.  No red flags.  Neurosurgery was consulted.  Plan for laminectomy/discectomy Thursday at 0730        Interval Problem Update  Patient was seen and examined at bedside.  There were no acute events overnight.  I have personally reviewed vitals, labs, and imaging.  Patient denies fever, chills, chest pain, shortness of breath.  Patient needed Ativan to relax for lumbar MRI.  Reports back pain is not well controlled although she is joking around and looks very comfortable.        Consultants/Specialty  Neurosurgery    Code Status  FULL    Disposition  TBD    Review of Systems  Review of Systems   Constitutional: Negative for chills and fever.   HENT: Negative for congestion, sinus pain and sore throat.    Eyes: Negative for blurred vision.   Respiratory: Negative for cough, hemoptysis, sputum production, shortness of breath and wheezing.    Cardiovascular: Negative for chest pain, palpitations and leg swelling.   Gastrointestinal: Negative for abdominal pain, blood in stool, constipation, diarrhea, nausea and vomiting.   Genitourinary: Negative for dysuria, frequency, hematuria and urgency.   Musculoskeletal: Positive for back pain. Negative for falls.   Skin: Negative for rash.   Neurological: Positive for tingling. Negative for dizziness, seizures, loss of consciousness, weakness and headaches.   Psychiatric/Behavioral: Negative for depression and suicidal ideas. The patient  is not nervous/anxious.    All other systems reviewed and are negative.       Physical Exam  Temp:  [36.1 °C (97 °F)-37.1 °C (98.7 °F)] 36.1 °C (97 °F)  Pulse:  [73-95] 95  Resp:  [16-18] 18  BP: (100-119)/(50-63) 100/50  SpO2:  [90 %-96 %] 96 %    Physical Exam  Vitals signs and nursing note reviewed.   Constitutional:       General: She is not in acute distress.     Appearance: Normal appearance.   HENT:      Head: Normocephalic and atraumatic.      Nose: Nose normal.      Mouth/Throat:      Mouth: Mucous membranes are moist.      Pharynx: Oropharynx is clear. No oropharyngeal exudate or posterior oropharyngeal erythema.   Eyes:      Extraocular Movements: Extraocular movements intact.      Conjunctiva/sclera: Conjunctivae normal.   Neck:      Musculoskeletal: Normal range of motion and neck supple.   Cardiovascular:      Rate and Rhythm: Normal rate and regular rhythm.      Pulses: Normal pulses.      Heart sounds: Normal heart sounds. No murmur.   Pulmonary:      Effort: Pulmonary effort is normal. No respiratory distress.      Breath sounds: Normal breath sounds. No stridor. No wheezing or rales.   Abdominal:      General: Abdomen is flat. Bowel sounds are normal. There is no distension.      Palpations: Abdomen is soft. There is no mass.      Tenderness: There is no tenderness.   Skin:     General: Skin is warm.      Capillary Refill: Capillary refill takes less than 2 seconds.   Neurological:      General: No focal deficit present.      Mental Status: She is alert and oriented to person, place, and time. Mental status is at baseline.      Cranial Nerves: No cranial nerve deficit.      Motor: No weakness.   Psychiatric:         Mood and Affect: Mood normal.         Behavior: Behavior normal.         Fluids    Intake/Output Summary (Last 24 hours) at 1/21/2020 2232  Last data filed at 1/21/2020 1935  Gross per 24 hour   Intake 700 ml   Output --   Net 700 ml       Laboratory  Recent Labs     01/20/20  8987    WBC 8.0   RBC 4.86   HEMOGLOBIN 14.6   HEMATOCRIT 44.4   MCV 91.4   MCH 30.0   MCHC 32.9*   RDW 39.8   PLATELETCT 315   MPV 10.0     Recent Labs     01/20/20  0843   SODIUM 137   POTASSIUM 4.1   CHLORIDE 106   CO2 24   GLUCOSE 72   BUN 8   CREATININE 0.95   CALCIUM 9.2                   Imaging  MR-LUMBAR SPINE-W/O   Final Result      1.  Previous laminectomy and bilateral pedicle screw and fatoumata fixation at the L5-S1 level. The right-sided pedicle screw at the L5 level courses medial to the right-sided pedicle.      2.  Grade 1 spondylolisthesis at the L5-S1 level.      3.  Large left paracentral disc extrusion at the L3-4 level markedly compressing the left ventral surface of thecal sac and the left L4 nerve root in its lateral recess.      4.  Small left paracentral disc extrusion at the L4-5 level effacing left ventral surface of thecal sac.      5.  Mild central canal stenosis at the L3-4 level secondary to facet arthropathy.      6.  Minimal to mild multilevel lumbar spondylotic change.      OUTSIDE IMAGES-CT LUMBAR SPINE   Final Result           Assessment/Plan  * Lumbar nerve root compression- (present on admission)  Assessment & Plan  MRI lumbar spine shows malpositioned right L5 pedicle screw/solid L5/S1 fusion.  Plan for laminectomy/discectomy Thursday 0730  Gabapentin 600 mg 3 times daily, PRN Toradol and Ultram    Chronic hepatitis C without hepatic coma (HCC)- (present on admission)  Assessment & Plan  Noted per history    Tobacco dependence- (present on admission)  Assessment & Plan  -counseled for more than 10 minutes on tobacco cessation 24431   -I have ordered nicotine replacement therapy      Schizoaffective disorder (HCC)- (present on admission)  Assessment & Plan  Continue Artane  Consider psychology consult    History of DVT (deep vein thrombosis)- (present on admission)  Assessment & Plan  Previous history and not on anticoagulation at this time  DVT prophylaxis with Lovenox    Back pain-  (present on admission)  Assessment & Plan  Acute on chronic  Neurosurgery consulting Dr. Garcia  Pain management  As needed Toradol and Percocet  On prednisone 60 mg daily       Gastrointestinal prophylaxis: The patient has no risk factors for developing gastric ulcers or gastritis.  As the patient is tolerating oral intake, GI prophylaxis is not indicated at this time.  Antibiotics: None  Diet: Cardiac  Code status: FULL  Prognosis: Guarded  Risk: The Patient is at HIGH risk for complications and decompensation secondary to her multiple cormorbidities including acute on chronic back pain with multiple back surgeries requiring IV pain medication, hepatitis C, tobacco abuse    I have personally reviewed notes, labs, vitals, imaging.  I discussed the plan of care with bedside RN as well as on multidisciplinary rounds      VTE prophylaxis: Enoxaparin

## 2020-01-23 ENCOUNTER — ANESTHESIA EVENT (OUTPATIENT)
Dept: SURGERY | Facility: MEDICAL CENTER | Age: 40
End: 2020-01-23
Payer: MEDICAID

## 2020-01-23 ENCOUNTER — APPOINTMENT (OUTPATIENT)
Dept: RADIOLOGY | Facility: MEDICAL CENTER | Age: 40
End: 2020-01-23
Attending: NEUROLOGICAL SURGERY
Payer: MEDICAID

## 2020-01-23 ENCOUNTER — ANESTHESIA (OUTPATIENT)
Dept: SURGERY | Facility: MEDICAL CENTER | Age: 40
End: 2020-01-23
Payer: MEDICAID

## 2020-01-23 LAB
ANION GAP SERPL CALC-SCNC: 8 MMOL/L (ref 0–11.9)
APTT PPP: 22.7 SEC (ref 24.7–36)
BASOPHILS # BLD AUTO: 0.3 % (ref 0–1.8)
BASOPHILS # BLD: 0.03 K/UL (ref 0–0.12)
BUN SERPL-MCNC: 16 MG/DL (ref 8–22)
CALCIUM SERPL-MCNC: 9.1 MG/DL (ref 8.5–10.5)
CFT BLD TEG: 2.2 MIN (ref 5–10)
CFT P HPASE BLD TEG: 2.2 MIN (ref 5–10)
CHLORIDE SERPL-SCNC: 106 MMOL/L (ref 96–112)
CLOT ANGLE BLD TEG: 66 DEGREES (ref 53–72)
CLOT ANGLE P HPASE BLD TEG: 68.5 DEGREES (ref 53–72)
CLOT INIT P HPASE BLD TEG: 1.7 MIN (ref 1–3)
CLOT LYSIS 30M P MA LENFR BLD TEG: 0 % (ref 0–8)
CLOT LYSIS 30M P MA LENFR BLD TEG: 0 % (ref 0–8)
CO2 SERPL-SCNC: 26 MMOL/L (ref 20–33)
CREAT SERPL-MCNC: 0.82 MG/DL (ref 0.5–1.4)
CT.EXTRINSIC BLD ROTEM: 1.9 MIN (ref 1–3)
EOSINOPHIL # BLD AUTO: 0.01 K/UL (ref 0–0.51)
EOSINOPHIL NFR BLD: 0.1 % (ref 0–6.9)
ERYTHROCYTE [DISTWIDTH] IN BLOOD BY AUTOMATED COUNT: 39.5 FL (ref 35.9–50)
GLUCOSE SERPL-MCNC: 82 MG/DL (ref 65–99)
HCT VFR BLD AUTO: 39.6 % (ref 37–47)
HGB BLD-MCNC: 13.1 G/DL (ref 12–16)
IMM GRANULOCYTES # BLD AUTO: 0.03 K/UL (ref 0–0.11)
IMM GRANULOCYTES NFR BLD AUTO: 0.3 % (ref 0–0.9)
INR PPP: 0.99 (ref 0.87–1.13)
LYMPHOCYTES # BLD AUTO: 4.72 K/UL (ref 1–4.8)
LYMPHOCYTES NFR BLD: 42.9 % (ref 22–41)
MAGNESIUM SERPL-MCNC: 2 MG/DL (ref 1.5–2.5)
MCF BLD TEG: 61.1 MM (ref 50–70)
MCF P HPASE BLD TEG: 64 MM (ref 50–70)
MCH RBC QN AUTO: 29.9 PG (ref 27–33)
MCHC RBC AUTO-ENTMCNC: 33.1 G/DL (ref 33.6–35)
MCV RBC AUTO: 90.4 FL (ref 81.4–97.8)
MONOCYTES # BLD AUTO: 0.57 K/UL (ref 0–0.85)
MONOCYTES NFR BLD AUTO: 5.2 % (ref 0–13.4)
NEUTROPHILS # BLD AUTO: 5.64 K/UL (ref 2–7.15)
NEUTROPHILS NFR BLD: 51.2 % (ref 44–72)
NRBC # BLD AUTO: 0 K/UL
NRBC BLD-RTO: 0 /100 WBC
PA AA BLD-ACNC: 23.6 %
PA ADP BLD-ACNC: 0 %
PHOSPHATE SERPL-MCNC: 4.4 MG/DL (ref 2.5–4.5)
PLATELET # BLD AUTO: 267 K/UL (ref 164–446)
PMV BLD AUTO: 9.5 FL (ref 9–12.9)
POTASSIUM SERPL-SCNC: 4 MMOL/L (ref 3.6–5.5)
PROTHROMBIN TIME: 13.3 SEC (ref 12–14.6)
RBC # BLD AUTO: 4.38 M/UL (ref 4.2–5.4)
SODIUM SERPL-SCNC: 140 MMOL/L (ref 135–145)
TEG ALGORITHM TGALG: ABNORMAL
WBC # BLD AUTO: 11 K/UL (ref 4.8–10.8)

## 2020-01-23 PROCEDURE — A9270 NON-COVERED ITEM OR SERVICE: HCPCS | Performed by: ANESTHESIOLOGY

## 2020-01-23 PROCEDURE — G0378 HOSPITAL OBSERVATION PER HR: HCPCS

## 2020-01-23 PROCEDURE — 160041 HCHG SURGERY MINUTES - EA ADDL 1 MIN LEVEL 4: Performed by: NEUROLOGICAL SURGERY

## 2020-01-23 PROCEDURE — 36415 COLL VENOUS BLD VENIPUNCTURE: CPT

## 2020-01-23 PROCEDURE — 160035 HCHG PACU - 1ST 60 MINS PHASE I: Performed by: NEUROLOGICAL SURGERY

## 2020-01-23 PROCEDURE — 83735 ASSAY OF MAGNESIUM: CPT

## 2020-01-23 PROCEDURE — A9270 NON-COVERED ITEM OR SERVICE: HCPCS | Performed by: NURSE PRACTITIONER

## 2020-01-23 PROCEDURE — 72020 X-RAY EXAM OF SPINE 1 VIEW: CPT

## 2020-01-23 PROCEDURE — 160036 HCHG PACU - EA ADDL 30 MINS PHASE I: Performed by: NEUROLOGICAL SURGERY

## 2020-01-23 PROCEDURE — 700111 HCHG RX REV CODE 636 W/ 250 OVERRIDE (IP): Performed by: ANESTHESIOLOGY

## 2020-01-23 PROCEDURE — 700102 HCHG RX REV CODE 250 W/ 637 OVERRIDE(OP): Performed by: HOSPITALIST

## 2020-01-23 PROCEDURE — 99226 PR SUBSEQUENT OBSERVATION CARE,LEVEL III: CPT | Performed by: INTERNAL MEDICINE

## 2020-01-23 PROCEDURE — 700102 HCHG RX REV CODE 250 W/ 637 OVERRIDE(OP): Performed by: ANESTHESIOLOGY

## 2020-01-23 PROCEDURE — 700105 HCHG RX REV CODE 258: Performed by: ANESTHESIOLOGY

## 2020-01-23 PROCEDURE — 85025 COMPLETE CBC W/AUTO DIFF WBC: CPT

## 2020-01-23 PROCEDURE — 160029 HCHG SURGERY MINUTES - 1ST 30 MINS LEVEL 4: Performed by: NEUROLOGICAL SURGERY

## 2020-01-23 PROCEDURE — 84100 ASSAY OF PHOSPHORUS: CPT

## 2020-01-23 PROCEDURE — 700102 HCHG RX REV CODE 250 W/ 637 OVERRIDE(OP): Performed by: INTERNAL MEDICINE

## 2020-01-23 PROCEDURE — 80048 BASIC METABOLIC PNL TOTAL CA: CPT

## 2020-01-23 PROCEDURE — 160002 HCHG RECOVERY MINUTES (STAT): Performed by: NEUROLOGICAL SURGERY

## 2020-01-23 PROCEDURE — 700111 HCHG RX REV CODE 636 W/ 250 OVERRIDE (IP): Performed by: NURSE PRACTITIONER

## 2020-01-23 PROCEDURE — A9270 NON-COVERED ITEM OR SERVICE: HCPCS | Performed by: HOSPITALIST

## 2020-01-23 PROCEDURE — 160048 HCHG OR STATISTICAL LEVEL 1-5: Performed by: NEUROLOGICAL SURGERY

## 2020-01-23 PROCEDURE — 700101 HCHG RX REV CODE 250: Performed by: NURSE PRACTITIONER

## 2020-01-23 PROCEDURE — 85576 BLOOD PLATELET AGGREGATION: CPT | Mod: 91

## 2020-01-23 PROCEDURE — 85384 FIBRINOGEN ACTIVITY: CPT

## 2020-01-23 PROCEDURE — 85730 THROMBOPLASTIN TIME PARTIAL: CPT

## 2020-01-23 PROCEDURE — 85347 COAGULATION TIME ACTIVATED: CPT

## 2020-01-23 PROCEDURE — 700111 HCHG RX REV CODE 636 W/ 250 OVERRIDE (IP): Performed by: EMERGENCY MEDICINE

## 2020-01-23 PROCEDURE — 96375 TX/PRO/DX INJ NEW DRUG ADDON: CPT | Mod: XU

## 2020-01-23 PROCEDURE — 85610 PROTHROMBIN TIME: CPT

## 2020-01-23 PROCEDURE — 501838 HCHG SUTURE GENERAL: Performed by: NEUROLOGICAL SURGERY

## 2020-01-23 PROCEDURE — 96365 THER/PROPH/DIAG IV INF INIT: CPT | Mod: XU

## 2020-01-23 PROCEDURE — 700102 HCHG RX REV CODE 250 W/ 637 OVERRIDE(OP): Performed by: NURSE PRACTITIONER

## 2020-01-23 PROCEDURE — 160009 HCHG ANES TIME/MIN: Performed by: NEUROLOGICAL SURGERY

## 2020-01-23 PROCEDURE — 700101 HCHG RX REV CODE 250: Performed by: NEUROLOGICAL SURGERY

## 2020-01-23 PROCEDURE — 700101 HCHG RX REV CODE 250: Performed by: ANESTHESIOLOGY

## 2020-01-23 PROCEDURE — 110454 HCHG SHELL REV 250: Performed by: NEUROLOGICAL SURGERY

## 2020-01-23 PROCEDURE — A9270 NON-COVERED ITEM OR SERVICE: HCPCS | Performed by: INTERNAL MEDICINE

## 2020-01-23 RX ORDER — BACITRACIN 50000 [IU]/1
INJECTION, POWDER, FOR SOLUTION INTRAMUSCULAR
Status: DISCONTINUED | OUTPATIENT
Start: 2020-01-23 | End: 2020-01-23 | Stop reason: HOSPADM

## 2020-01-23 RX ORDER — DIPHENHYDRAMINE HCL 25 MG
25 TABLET ORAL EVERY 6 HOURS PRN
Status: DISCONTINUED | OUTPATIENT
Start: 2020-01-23 | End: 2020-01-27 | Stop reason: HOSPADM

## 2020-01-23 RX ORDER — SODIUM CHLORIDE, SODIUM LACTATE, POTASSIUM CHLORIDE, CALCIUM CHLORIDE 600; 310; 30; 20 MG/100ML; MG/100ML; MG/100ML; MG/100ML
INJECTION, SOLUTION INTRAVENOUS CONTINUOUS
Status: DISCONTINUED | OUTPATIENT
Start: 2020-01-23 | End: 2020-01-23 | Stop reason: HOSPADM

## 2020-01-23 RX ORDER — LABETALOL HYDROCHLORIDE 5 MG/ML
5 INJECTION, SOLUTION INTRAVENOUS
Status: DISCONTINUED | OUTPATIENT
Start: 2020-01-23 | End: 2020-01-23 | Stop reason: HOSPADM

## 2020-01-23 RX ORDER — BISACODYL 10 MG
10 SUPPOSITORY, RECTAL RECTAL
Status: DISCONTINUED | OUTPATIENT
Start: 2020-01-23 | End: 2020-01-27 | Stop reason: HOSPADM

## 2020-01-23 RX ORDER — MEPERIDINE HYDROCHLORIDE 25 MG/ML
12.5 INJECTION INTRAMUSCULAR; INTRAVENOUS; SUBCUTANEOUS
Status: DISCONTINUED | OUTPATIENT
Start: 2020-01-23 | End: 2020-01-23 | Stop reason: HOSPADM

## 2020-01-23 RX ORDER — ONDANSETRON 2 MG/ML
4 INJECTION INTRAMUSCULAR; INTRAVENOUS EVERY 4 HOURS PRN
Status: DISCONTINUED | OUTPATIENT
Start: 2020-01-23 | End: 2020-01-27 | Stop reason: HOSPADM

## 2020-01-23 RX ORDER — DOCUSATE SODIUM 100 MG/1
100 CAPSULE, LIQUID FILLED ORAL 2 TIMES DAILY
Status: DISCONTINUED | OUTPATIENT
Start: 2020-01-23 | End: 2020-01-27 | Stop reason: HOSPADM

## 2020-01-23 RX ORDER — MORPHINE SULFATE 4 MG/ML
1 INJECTION, SOLUTION INTRAMUSCULAR; INTRAVENOUS
Status: DISCONTINUED | OUTPATIENT
Start: 2020-01-23 | End: 2020-01-23

## 2020-01-23 RX ORDER — METHOCARBAMOL 750 MG/1
750 TABLET, FILM COATED ORAL EVERY 8 HOURS PRN
Status: DISCONTINUED | OUTPATIENT
Start: 2020-01-23 | End: 2020-01-27 | Stop reason: HOSPADM

## 2020-01-23 RX ORDER — PROMETHAZINE HYDROCHLORIDE 12.5 MG/1
12.5-25 SUPPOSITORY RECTAL EVERY 4 HOURS PRN
Status: DISCONTINUED | OUTPATIENT
Start: 2020-01-23 | End: 2020-01-27 | Stop reason: HOSPADM

## 2020-01-23 RX ORDER — ENEMA 19; 7 G/133ML; G/133ML
1 ENEMA RECTAL
Status: DISCONTINUED | OUTPATIENT
Start: 2020-01-23 | End: 2020-01-27 | Stop reason: HOSPADM

## 2020-01-23 RX ORDER — AMOXICILLIN 250 MG
1 CAPSULE ORAL NIGHTLY
Status: DISCONTINUED | OUTPATIENT
Start: 2020-01-23 | End: 2020-01-27 | Stop reason: HOSPADM

## 2020-01-23 RX ORDER — HALOPERIDOL 5 MG/ML
1 INJECTION INTRAMUSCULAR
Status: DISCONTINUED | OUTPATIENT
Start: 2020-01-23 | End: 2020-01-23 | Stop reason: HOSPADM

## 2020-01-23 RX ORDER — ROCURONIUM BROMIDE 10 MG/ML
INJECTION, SOLUTION INTRAVENOUS PRN
Status: DISCONTINUED | OUTPATIENT
Start: 2020-01-23 | End: 2020-01-23 | Stop reason: SURG

## 2020-01-23 RX ORDER — SODIUM CHLORIDE AND POTASSIUM CHLORIDE 150; 900 MG/100ML; MG/100ML
INJECTION, SOLUTION INTRAVENOUS CONTINUOUS
Status: DISCONTINUED | OUTPATIENT
Start: 2020-01-23 | End: 2020-01-24

## 2020-01-23 RX ORDER — BUPIVACAINE HYDROCHLORIDE AND EPINEPHRINE 5; 5 MG/ML; UG/ML
INJECTION, SOLUTION PERINEURAL
Status: DISCONTINUED | OUTPATIENT
Start: 2020-01-23 | End: 2020-01-23 | Stop reason: HOSPADM

## 2020-01-23 RX ORDER — HYDRALAZINE HYDROCHLORIDE 20 MG/ML
5 INJECTION INTRAMUSCULAR; INTRAVENOUS
Status: DISCONTINUED | OUTPATIENT
Start: 2020-01-23 | End: 2020-01-23 | Stop reason: HOSPADM

## 2020-01-23 RX ORDER — DIAZEPAM 5 MG/1
5 TABLET ORAL EVERY 4 HOURS PRN
Status: DISCONTINUED | OUTPATIENT
Start: 2020-01-23 | End: 2020-01-27 | Stop reason: HOSPADM

## 2020-01-23 RX ORDER — CYCLOBENZAPRINE HCL 10 MG
10 TABLET ORAL EVERY 8 HOURS PRN
Status: DISCONTINUED | OUTPATIENT
Start: 2020-01-23 | End: 2020-01-27 | Stop reason: HOSPADM

## 2020-01-23 RX ORDER — CEFAZOLIN SODIUM 2 G/100ML
2 INJECTION, SOLUTION INTRAVENOUS EVERY 8 HOURS
Status: COMPLETED | OUTPATIENT
Start: 2020-01-23 | End: 2020-01-23

## 2020-01-23 RX ORDER — OXYCODONE HYDROCHLORIDE AND ACETAMINOPHEN 5; 325 MG/1; MG/1
1 TABLET ORAL
Status: COMPLETED | OUTPATIENT
Start: 2020-01-23 | End: 2020-01-23

## 2020-01-23 RX ORDER — CEFAZOLIN SODIUM 1 G/3ML
INJECTION, POWDER, FOR SOLUTION INTRAMUSCULAR; INTRAVENOUS PRN
Status: DISCONTINUED | OUTPATIENT
Start: 2020-01-23 | End: 2020-01-23 | Stop reason: SURG

## 2020-01-23 RX ORDER — ONDANSETRON 2 MG/ML
INJECTION INTRAMUSCULAR; INTRAVENOUS PRN
Status: DISCONTINUED | OUTPATIENT
Start: 2020-01-23 | End: 2020-01-23 | Stop reason: SURG

## 2020-01-23 RX ORDER — SODIUM CHLORIDE, SODIUM LACTATE, POTASSIUM CHLORIDE, CALCIUM CHLORIDE 600; 310; 30; 20 MG/100ML; MG/100ML; MG/100ML; MG/100ML
INJECTION, SOLUTION INTRAVENOUS
Status: DISCONTINUED | OUTPATIENT
Start: 2020-01-23 | End: 2020-01-23 | Stop reason: SURG

## 2020-01-23 RX ORDER — MORPHINE SULFATE 10 MG/ML
5 INJECTION, SOLUTION INTRAMUSCULAR; INTRAVENOUS
Status: DISCONTINUED | OUTPATIENT
Start: 2020-01-23 | End: 2020-01-23

## 2020-01-23 RX ORDER — MIDAZOLAM HYDROCHLORIDE 1 MG/ML
INJECTION INTRAMUSCULAR; INTRAVENOUS PRN
Status: DISCONTINUED | OUTPATIENT
Start: 2020-01-23 | End: 2020-01-23 | Stop reason: SURG

## 2020-01-23 RX ORDER — DIPHENHYDRAMINE HYDROCHLORIDE 50 MG/ML
25 INJECTION INTRAMUSCULAR; INTRAVENOUS EVERY 6 HOURS PRN
Status: DISCONTINUED | OUTPATIENT
Start: 2020-01-23 | End: 2020-01-27 | Stop reason: HOSPADM

## 2020-01-23 RX ORDER — ONDANSETRON 4 MG/1
4 TABLET, ORALLY DISINTEGRATING ORAL EVERY 4 HOURS PRN
Status: DISCONTINUED | OUTPATIENT
Start: 2020-01-23 | End: 2020-01-27 | Stop reason: HOSPADM

## 2020-01-23 RX ORDER — AMOXICILLIN 250 MG
1 CAPSULE ORAL
Status: DISCONTINUED | OUTPATIENT
Start: 2020-01-23 | End: 2020-01-27 | Stop reason: HOSPADM

## 2020-01-23 RX ORDER — METOPROLOL TARTRATE 1 MG/ML
1 INJECTION, SOLUTION INTRAVENOUS
Status: DISCONTINUED | OUTPATIENT
Start: 2020-01-23 | End: 2020-01-23 | Stop reason: HOSPADM

## 2020-01-23 RX ORDER — POLYETHYLENE GLYCOL 3350 17 G/17G
1 POWDER, FOR SOLUTION ORAL 2 TIMES DAILY PRN
Status: DISCONTINUED | OUTPATIENT
Start: 2020-01-23 | End: 2020-01-27 | Stop reason: HOSPADM

## 2020-01-23 RX ORDER — PROCHLORPERAZINE EDISYLATE 5 MG/ML
5-10 INJECTION INTRAMUSCULAR; INTRAVENOUS EVERY 4 HOURS PRN
Status: DISCONTINUED | OUTPATIENT
Start: 2020-01-23 | End: 2020-01-27 | Stop reason: HOSPADM

## 2020-01-23 RX ORDER — OXYCODONE HYDROCHLORIDE AND ACETAMINOPHEN 5; 325 MG/1; MG/1
2 TABLET ORAL
Status: COMPLETED | OUTPATIENT
Start: 2020-01-23 | End: 2020-01-23

## 2020-01-23 RX ORDER — DEXAMETHASONE SODIUM PHOSPHATE 4 MG/ML
INJECTION, SOLUTION INTRA-ARTICULAR; INTRALESIONAL; INTRAMUSCULAR; INTRAVENOUS; SOFT TISSUE PRN
Status: DISCONTINUED | OUTPATIENT
Start: 2020-01-23 | End: 2020-01-23 | Stop reason: SURG

## 2020-01-23 RX ORDER — MORPHINE SULFATE 4 MG/ML
2 INJECTION, SOLUTION INTRAMUSCULAR; INTRAVENOUS
Status: DISCONTINUED | OUTPATIENT
Start: 2020-01-23 | End: 2020-01-23

## 2020-01-23 RX ORDER — PROMETHAZINE HYDROCHLORIDE 25 MG/1
12.5-25 TABLET ORAL EVERY 4 HOURS PRN
Status: DISCONTINUED | OUTPATIENT
Start: 2020-01-23 | End: 2020-01-27 | Stop reason: HOSPADM

## 2020-01-23 RX ORDER — DIPHENHYDRAMINE HYDROCHLORIDE 50 MG/ML
12.5 INJECTION INTRAMUSCULAR; INTRAVENOUS
Status: DISCONTINUED | OUTPATIENT
Start: 2020-01-23 | End: 2020-01-23 | Stop reason: HOSPADM

## 2020-01-23 RX ADMIN — OXYCODONE HYDROCHLORIDE AND ACETAMINOPHEN 1 TABLET: 5; 325 TABLET ORAL at 04:11

## 2020-01-23 RX ADMIN — HALOPERIDOL 5 MG: 5 TABLET ORAL at 14:33

## 2020-01-23 RX ADMIN — GABAPENTIN 600 MG: 300 CAPSULE ORAL at 04:11

## 2020-01-23 RX ADMIN — DIBASIC SODIUM PHOSPHATE, MONOBASIC POTASSIUM PHOSPHATE AND MONOBASIC SODIUM PHOSPHATE 2 TABLET: 852; 155; 130 TABLET ORAL at 00:06

## 2020-01-23 RX ADMIN — METHOCARBAMOL TABLETS 750 MG: 750 TABLET, COATED ORAL at 21:10

## 2020-01-23 RX ADMIN — PREDNISONE 60 MG: 20 TABLET ORAL at 04:12

## 2020-01-23 RX ADMIN — GABAPENTIN 600 MG: 300 CAPSULE ORAL at 21:09

## 2020-01-23 RX ADMIN — OXYCODONE HYDROCHLORIDE AND ACETAMINOPHEN 2 TABLET: 5; 325 TABLET ORAL at 08:47

## 2020-01-23 RX ADMIN — FENTANYL CITRATE 50 MCG: 0.05 INJECTION, SOLUTION INTRAMUSCULAR; INTRAVENOUS at 09:00

## 2020-01-23 RX ADMIN — FENTANYL CITRATE 50 MCG: 50 INJECTION, SOLUTION INTRAMUSCULAR; INTRAVENOUS at 07:20

## 2020-01-23 RX ADMIN — HALOPERIDOL LACTATE 1 MG: 5 INJECTION, SOLUTION INTRAMUSCULAR at 09:26

## 2020-01-23 RX ADMIN — SODIUM CHLORIDE, POTASSIUM CHLORIDE, SODIUM LACTATE AND CALCIUM CHLORIDE: 600; 310; 30; 20 INJECTION, SOLUTION INTRAVENOUS at 06:57

## 2020-01-23 RX ADMIN — FENTANYL CITRATE 50 MCG: 0.05 INJECTION, SOLUTION INTRAMUSCULAR; INTRAVENOUS at 08:52

## 2020-01-23 RX ADMIN — ROCURONIUM BROMIDE 50 MG: 10 INJECTION, SOLUTION INTRAVENOUS at 07:09

## 2020-01-23 RX ADMIN — SENNOSIDES AND DOCUSATE SODIUM 1 TABLET: 8.6; 5 TABLET ORAL at 21:10

## 2020-01-23 RX ADMIN — SUGAMMADEX 200 MG: 100 INJECTION, SOLUTION INTRAVENOUS at 08:21

## 2020-01-23 RX ADMIN — Medication: at 09:38

## 2020-01-23 RX ADMIN — GABAPENTIN 600 MG: 300 CAPSULE ORAL at 14:32

## 2020-01-23 RX ADMIN — HALOPERIDOL 5 MG: 5 TABLET ORAL at 04:11

## 2020-01-23 RX ADMIN — CEFAZOLIN SODIUM 2 G: 2 INJECTION, SOLUTION INTRAVENOUS at 21:11

## 2020-01-23 RX ADMIN — DEXAMETHASONE SODIUM PHOSPHATE 8 MG: 4 INJECTION, SOLUTION INTRA-ARTICULAR; INTRALESIONAL; INTRAMUSCULAR; INTRAVENOUS; SOFT TISSUE at 06:57

## 2020-01-23 RX ADMIN — POTASSIUM CHLORIDE AND SODIUM CHLORIDE: 900; 150 INJECTION, SOLUTION INTRAVENOUS at 18:03

## 2020-01-23 RX ADMIN — HALOPERIDOL 5 MG: 5 TABLET ORAL at 21:09

## 2020-01-23 RX ADMIN — PROPOFOL 200 MG: 10 INJECTION, EMULSION INTRAVENOUS at 07:09

## 2020-01-23 RX ADMIN — POTASSIUM CHLORIDE AND SODIUM CHLORIDE: 900; 150 INJECTION, SOLUTION INTRAVENOUS at 10:50

## 2020-01-23 RX ADMIN — NICOTINE TRANSDERMAL SYSTEM 21 MG: 21 PATCH, EXTENDED RELEASE TRANSDERMAL at 04:12

## 2020-01-23 RX ADMIN — FENTANYL CITRATE 50 MCG: 0.05 INJECTION, SOLUTION INTRAMUSCULAR; INTRAVENOUS at 08:50

## 2020-01-23 RX ADMIN — ONDANSETRON 4 MG: 2 INJECTION INTRAMUSCULAR; INTRAVENOUS at 08:21

## 2020-01-23 RX ADMIN — CEFAZOLIN SODIUM 2 G: 2 INJECTION, SOLUTION INTRAVENOUS at 14:33

## 2020-01-23 RX ADMIN — FENTANYL CITRATE 50 MCG: 0.05 INJECTION, SOLUTION INTRAMUSCULAR; INTRAVENOUS at 09:08

## 2020-01-23 RX ADMIN — CEFAZOLIN 3 G: 330 INJECTION, POWDER, FOR SOLUTION INTRAMUSCULAR; INTRAVENOUS at 06:56

## 2020-01-23 RX ADMIN — MIDAZOLAM HYDROCHLORIDE 2 MG: 1 INJECTION, SOLUTION INTRAMUSCULAR; INTRAVENOUS at 06:56

## 2020-01-23 RX ADMIN — FENTANYL CITRATE 150 MCG: 50 INJECTION, SOLUTION INTRAMUSCULAR; INTRAVENOUS at 07:09

## 2020-01-23 ASSESSMENT — ENCOUNTER SYMPTOMS
VOMITING: 0
LOSS OF CONSCIOUSNESS: 0
BLURRED VISION: 0
COUGH: 0
ABDOMINAL PAIN: 0
FALLS: 0
DIZZINESS: 0
SHORTNESS OF BREATH: 0
FEVER: 0
BLOOD IN STOOL: 0
PALPITATIONS: 0
SORE THROAT: 0
HEMOPTYSIS: 0
SPUTUM PRODUCTION: 0
CHILLS: 0
TINGLING: 1
HEADACHES: 0
NERVOUS/ANXIOUS: 0
WHEEZING: 0
SINUS PAIN: 0
BACK PAIN: 1
WEAKNESS: 0
NAUSEA: 0
CONSTIPATION: 0
DIARRHEA: 0
DEPRESSION: 0
SEIZURES: 0

## 2020-01-23 ASSESSMENT — PAIN SCALES - GENERAL: PAIN_LEVEL: 2

## 2020-01-23 ASSESSMENT — PATIENT HEALTH QUESTIONNAIRE - PHQ9
2. FEELING DOWN, DEPRESSED, IRRITABLE, OR HOPELESS: NOT AT ALL
SUM OF ALL RESPONSES TO PHQ9 QUESTIONS 1 AND 2: 0
1. LITTLE INTEREST OR PLEASURE IN DOING THINGS: NOT AT ALL

## 2020-01-23 NOTE — PROGRESS NOTES
[facility] Medicine Daily Progress Note    Date of Service  1/22/2020    Chief Complaint  39 y.o. female admitted 1/20/2020 with acute on chronic back pain    Hospital Course    Ms. Cira Ferguson is a 39 y.o. female with multiple prior back surgeries who presented with acute on chronic back pain.  Had acute exacerbation of back pain in December after dancing all night at a [employer] concert.  Was seen at hospital in [place] and no surgery was recommended.  On admission to [facility] she reports her back pain radiates down bilateral lower extremities.  No red flags.  Neurosurgery was consulted.  Plan for laminectomy/discectomy Thursday at 0730        Interval Problem Update  Patient was seen and examined at bedside.  There were no acute events overnight.  I have personally reviewed vitals, labs, and imaging.  Patient denies fever, chills, chest pain, shortness of breath.  Back pain is not well controlled, currently rated 6/10.  Patient looks very comfortable and has stable vitals.      Consultants/Specialty  Neurosurgery    Code Status  FULL    Disposition  TBD    Review of Systems  Review of Systems   Constitutional: Negative for chills and fever.   HENT: Negative for congestion, sinus pain and sore throat.    Eyes: Negative for blurred vision.   Respiratory: Negative for cough, hemoptysis, sputum production, shortness of breath and wheezing.    Cardiovascular: Negative for chest pain, palpitations and leg swelling.   Gastrointestinal: Negative for abdominal pain, blood in stool, constipation, diarrhea, nausea and vomiting.   Genitourinary: Negative for dysuria, frequency, hematuria and urgency.   Musculoskeletal: Positive for back pain. Negative for falls.   Skin: Negative for rash.   Neurological: Positive for tingling. Negative for dizziness, seizures, loss of consciousness, weakness and headaches.   Psychiatric/Behavioral: Negative for depression and suicidal ideas. The patient is not nervous/anxious.    All other  systems reviewed and are negative.       Physical Exam  Temp:  [36.1 °C (97 °F)-37.1 °C (98.7 °F)] 36.9 °C (98.4 °F)  Pulse:  [65-95] 69  Resp:  [18] 18  BP: (100-120)/(50-75) 110/75  SpO2:  [91 %-97 %] 96 %    Physical Exam  Vitals signs and nursing note reviewed.   Constitutional:       General: She is not in acute distress.     Appearance: Normal appearance.   HENT:      Head: Normocephalic and atraumatic.      Nose: Nose normal.      Mouth/Throat:      Mouth: Mucous membranes are moist.      Pharynx: Oropharynx is clear. No oropharyngeal exudate or posterior oropharyngeal erythema.   Eyes:      Extraocular Movements: Extraocular movements intact.      Conjunctiva/sclera: Conjunctivae normal.   Neck:      Musculoskeletal: Normal range of motion and neck supple.   Cardiovascular:      Rate and Rhythm: Normal rate and regular rhythm.      Pulses: Normal pulses.      Heart sounds: Normal heart sounds. No murmur.   Pulmonary:      Effort: Pulmonary effort is normal. No respiratory distress.      Breath sounds: Normal breath sounds. No stridor. No wheezing or rales.   Abdominal:      General: Abdomen is flat. Bowel sounds are normal. There is no distension.      Palpations: Abdomen is soft. There is no mass.      Tenderness: There is no tenderness.   Skin:     General: Skin is warm.      Capillary Refill: Capillary refill takes less than 2 seconds.   Neurological:      General: No focal deficit present.      Mental Status: She is alert and oriented to person, place, and time. Mental status is at baseline.      Cranial Nerves: No cranial nerve deficit.      Motor: No weakness.   Psychiatric:         Mood and Affect: Mood normal.         Behavior: Behavior normal.         Fluids    Intake/Output Summary (Last 24 hours) at 1/22/2020 1657  Last data filed at 1/22/2020 1300  Gross per 24 hour   Intake 720 ml   Output --   Net 720 ml       Laboratory  Recent Labs     01/20/20  0843 01/22/20  0948   WBC 8.0 9.4   RBC 4.86  4.86   HEMOGLOBIN 14.6 14.5   HEMATOCRIT 44.4 45.2   MCV 91.4 93.0   MCH 30.0 29.8   MCHC 32.9* 32.1*   RDW 39.8 41.8   PLATELETCT 315 264   MPV 10.0 9.4     Recent Labs     01/20/20  0843 01/22/20  0948   SODIUM 137 139   POTASSIUM 4.1 4.1   CHLORIDE 106 106   CO2 24 24   GLUCOSE 72 104*   BUN 8 16   CREATININE 0.95 0.98   CALCIUM 9.2 9.6                   Imaging  DX-CHEST-2 VIEWS   Final Result      No evidence of acute cardiopulmonary disease      MR-LUMBAR SPINE-W/O   Final Result      1.  Previous laminectomy and bilateral pedicle screw and fatoumata fixation at the L5-S1 level. The right-sided pedicle screw at the L5 level courses medial to the right-sided pedicle.      2.  Grade 1 spondylolisthesis at the L5-S1 level.      3.  Large left paracentral disc extrusion at the L3-4 level markedly compressing the left ventral surface of thecal sac and the left L4 nerve root in its lateral recess.      4.  Small left paracentral disc extrusion at the L4-5 level effacing left ventral surface of thecal sac.      5.  Mild central canal stenosis at the L3-4 level secondary to facet arthropathy.      6.  Minimal to mild multilevel lumbar spondylotic change.      OUTSIDE IMAGES-CT LUMBAR SPINE   Final Result           Assessment/Plan  * Lumbar nerve root compression- (present on admission)  Assessment & Plan  MRI lumbar spine shows malpositioned right L5 pedicle screw/solid L5/S1 fusion.  Plan for laminectomy/discectomy Thursday 0730  Gabapentin 600 mg 3 times daily, PRN Toradol and Ultram    Chronic hepatitis C without hepatic coma (HCC)- (present on admission)  Assessment & Plan  Noted per history    Tobacco dependence- (present on admission)  Assessment & Plan  -counseled for more than 10 minutes on tobacco cessation 90891   -I have ordered nicotine replacement therapy      Schizoaffective disorder (HCC)- (present on admission)  Assessment & Plan  Continue trihexyphenidyl  Consider psychology consult    History of DVT (deep  vein thrombosis)- (present on admission)  Assessment & Plan  Anticoagulation on hold prior to surgery tomorrow    Back pain- (present on admission)  Assessment & Plan  Acute on chronic  Neurosurgery consulted Dr. Garcia  Pain management  As needed Percocet.  Toradol DC'd per neurosurgery prior to surgery  On prednisone 60 mg daily  Patient appears very comfortable on exam and has stable vitals.  She is in good spirits and joking around.  Currently pain rated a 6/10.  She is on a Lidoderm patch.  States she has tried muscle relaxants with no relief.  Reports being on Oxy 10 prior to admission, but this has not been confirmed.     Gastrointestinal prophylaxis: The patient has no risk factors for developing gastric ulcers or gastritis.  As the patient is tolerating oral intake, GI prophylaxis is not indicated at this time.  Antibiotics: None  Diet: Cardiac  Code status: FULL  Prognosis: Guarded  Risk: The Patient is at HIGH risk for complications and decompensation secondary to her multiple cormorbidities including acute on chronic back pain with multiple back surgeries requiring IV pain medication, hepatitis C, tobacco abuse    I have personally reviewed notes, labs, vitals, imaging.  I discussed the plan of care with bedside RN as well as on multidisciplinary rounds    I have performed a physical exam and reviewed and updated ROS and Plan today (1/22/2020). In review of yesterday's note (1/21/2020), there are no changes except as documented above.       VTE prophylaxis: SCDs.

## 2020-01-23 NOTE — ANESTHESIA PROCEDURE NOTES
Airway  Date/Time: 1/23/2020 7:09 AM  Performed by: Barrett Merritt D.O.  Authorized by: Barrett Merritt D.O.     Location:  OR  Urgency:  Elective  Indications for Airway Management:  Anesthesia  Spontaneous Ventilation: absent    Sedation Level:  Deep  Preoxygenated: Yes    Patient Position:  Sniffing  Mask Difficulty Assessment:  2 - vent by mask + OA or adjuvant +/- NMBA  Final Airway Type:  Endotracheal airway  Final Endotracheal Airway:  ETT  Cuffed: Yes    Technique Used for Successful ETT Placement:  Direct laryngoscopy  Insertion Site:  Oral  Blade Type:  Ene  Laryngoscope Blade/Videolaryngoscope Blade Size:  3  ETT Size (mm):  7.5  Measured from:  Lips  ETT to Lips (cm):  22  Placement Verified by: auscultation and capnometry    Cormack-Lehane Classification:  Grade I - full view of glottis  Number of Attempts at Approach:  1

## 2020-01-23 NOTE — ANESTHESIA POSTPROCEDURE EVALUATION
Patient: Cira Ferguson    Procedure Summary     Date:  01/23/20 Room / Location:  [facility] OR 05 / SURGERY [facility]    Anesthesia Start:  0656 Anesthesia Stop:  0841    Procedure:  LAMINECTOMY, SPINE, LUMBAR, WITH DISCECTOMY  L3-L4 (Left Spine Lumbar) Diagnosis:  (Lumbar radiculopathy)    Surgeon:  Tae Garcia M.D. Responsible Provider:  Barrett Merritt D.O.    Anesthesia Type:  general ASA Status:  2          Final Anesthesia Type: general  Last vitals  BP   Blood Pressure: 136/79    Temp   36.8 °C (98.2 °F)    Pulse   Pulse: 68   Resp   17    SpO2   96 %      Anesthesia Post Evaluation    Patient location during evaluation: PACU  Patient participation: complete - patient participated  Level of consciousness: awake and alert  Pain score: 2    Airway patency: patent  Anesthetic complications: no  Cardiovascular status: hemodynamically stable  Respiratory status: acceptable  Hydration status: euvolemic    PONV: none           Nurse Pain Score: 0 (NPRS)

## 2020-01-23 NOTE — ANESTHESIA QCDR
2019 [facility] Clinical Data Registry (for Quality Improvement)     Postoperative nausea/vomiting risk protocol (Adult = 18 yrs and Pediatric 3-17 yrs)- (430 and 463)  General inhalation anesthetic (NOT TIVA) with PONV risk factors: Yes  Provision of anti-emetic therapy with at least 2 different classes of agents: Yes   Patient DID NOT receive anti-emetic therapy and reason is documented in Medical Record:  N/A    Multimodal Pain Management- (477)  Non-emergent surgery AND patient age >= 18: No  Use of Multimodal Pain Management, two or more drugs and/or interventions, NOT including systemic opioids:   Exception: Documented allergy to multiple classes of analgesics:     Smoking Abstinence (404)  Patient is current smoker (cigarette, pipe, e-cig, marijuanna): Yes  Elective Surgery: No  Abstinence instructions provided prior to day of surgery:   Patient abstained from smoking on day of surgery:     Pre-Op Beta-Blocker in Isolated CABG (44)  Isolated CABG AND patient age >= 18: No  Beta-blocker admin within 24 hours of surgical incision:   Exception:of medical reason(s) for not administering beta blocker within 24 hours prior to surgical incision (e.g., not  indicated,other medical reason):     PACU assessment of acute postoperative pain prior to Anesthesia Care End- Applies to Patients Age = 18- (ABG7)  Initial PACU pain score is which of the following: < 7/10  Patient unable to report pain score: N/A    Post-anesthetic transfer of care checklist/protocol to PACU/ICU- (426 and 427)  Upon conclusion of case, patient transferred to which of the following locations: PACU/Non-ICU  Use of transfer checklist/protocol: Yes  Exclusion: Service Performed in Patient Hospital Room (and thus did not require transfer): N/A  Unplanned admission to ICU related to anesthesia service up through end of PACU care- (MD51)  Unplanned admission to ICU (not initially anticipated at anesthesia start time): No

## 2020-01-23 NOTE — PROGRESS NOTES
Patient report received from [facility] PACU RN, vitals stable, need to void by 3pm, hooked up to post op vitals machine AND  machine, morphine PCA confirmed with Pat LOGAN, patient axox4, neuro check performed, patient states she now has feeling in her bilateral lower extremities, Hemovac drain no output but line is full of sanguinous drainage, incision dressing CDI    Patient report handed off to Luiza LOGAN approx 1530, stable vitals, adequate I/os, antibiotics given, fluids administered, patient ambulated to bathroom x 2, pain controlled with morphine pca, diet, oxygenation, dressing type, hemovac, history, and rest of full patient report given.

## 2020-01-23 NOTE — PROGRESS NOTES
1545 Report received over the phone from DESMOND An.     1715 Received patient from  [place] [id number] via bed accompanied by one male transport.    1740 Patient's sitting up in bed. Rates back pain 5/10. On Morphine PCA. Educated on the importance/use of IS at least 10x every hour while awake, able to reach 2250. Fall Protocol in effect. Call light within reach. Reminded patient to call for assist. Assessment completed. No distress noted. Plan of care reviewed with the patient. Verbalized understanding.    1805 Patient's sitting up in bed, having Dinner. No distress noted.

## 2020-01-23 NOTE — ANESTHESIA PREPROCEDURE EVALUATION
Relevant Problems   PULMONARY   (+) Asthma      NEURO   (+) History of DVT (deep vein thrombosis)         (+) Chronic hepatitis C without hepatic coma (HCC)       Physical Exam    Airway   Mallampati: II  TM distance: >3 FB  Neck ROM: full       Cardiovascular - normal exam  Rhythm: regular  Rate: normal  (-) murmur     Dental - normal exam         Pulmonary - normal exam  Breath sounds clear to auscultation     Abdominal    Neurological - normal exam                 Anesthesia Plan    ASA 2       Plan - general       Airway plan will be ETT        Induction: intravenous    Postoperative Plan: Postoperative administration of opioids is intended.    Pertinent diagnostic labs and testing reviewed    Informed Consent:    Anesthetic plan and risks discussed with patient.    Use of blood products discussed with: patient whom consented to blood products.

## 2020-01-23 NOTE — PROGRESS NOTES
Patient received from OR at 0840, bedside report received from anesthesia/OR RN, 2 patient identifiers confirmed . Patient connected to monitors, assessed for general head to toe and pain/nausea. Ordered reviewed and checked. Family updated via telephone on patient status, no answer but RN left message.    At this time, patient meets criteria for D/C to phase II. VSS, awake and appropriate, able to sleep through pain, PCA set up for pain control, patient able to press button and is appropriately using, states numbness in BLE improved since surgery, neuro checks all WNL with the exception of minimal BLE numbness. Hemovac line filled but not enough drainage to dump, dressing CDI.     Neurosurgery will not have a room for patient until 12-1pm,  Per PACU charge at this time patient will go back to [facility] and transfer from existing room later today. Updated patient on plan. Report called to Wilver LOGAN.

## 2020-01-23 NOTE — ANESTHESIA TIME REPORT
Anesthesia Start and Stop Event Times     Date Time Event    1/23/2020 0640 Ready for Procedure     0656 Anesthesia Start     0841 Anesthesia Stop        Responsible Staff  01/23/20    Name Role Begin End    Barrett Merritt D.O. Anesth 0656 0841        Preop Diagnosis (Free Text):  Pre-op Diagnosis     Lumbar radiculopathy        Preop Diagnosis (Codes):    Post op Diagnosis  Lumbar radiculopathy      Premium Reason  A. 3PM - 7AM    Comments:

## 2020-01-23 NOTE — OP REPORT
DATE OF SERVICE:  01/23/2020    PREOPERATIVE DIAGNOSIS:  Huge central and left-sided L3-L4 herniated nucleus   pulposus with thecal sac and left L4 nerve root compression.    POSTOPERATIVE DIAGNOSIS:  Huge central and left-sided L3-L4 herniated nucleus   pulposus with thecal sac and left L4 nerve root compression.    OPERATION PERFORMED:  Microscopic bilateral L3-L4 partial laminectomies, left   medial facetectomy, microscopic excision of herniated disk, decompression of   thecal sac and left L4 nerve root.    SURGEON:  Tae Garcia MD    ASSISTANT:  RAYMOND Arellano    ANESTHESIA:  General endotracheal.    ANESTHESIOLOGIST:  Barrett Merritt DO    PREPARATION:  ChloraPrep.    MEDICATIONS:  Patient given Ancef prior to incision.    INDICATIONS:  This woman with severe radicular pain, marked compression of her   thecal sac, and a very large disk herniation.  The patient is felt to be a   candidate for proposed procedure to relieve radicular pain, prevent further   loss of neurologic function, and optimize the potential for the return of loss   of function.  The patient understood major risks and complications such as   neurologic worsening relatively rare, small risk of wound infection, spinal   fluid leak, biggest risk of any low back surgery's nonresponse, which is   10-15%; chance she will require another operation rest of her life 15-20%.    The patient is understanding and agreed to proceed, signed consent.    DESCRIPTION OF PROCEDURE:  The patient was brought to operating room,   peripheral venous lines in place.  General anesthesia was induced, patient   intubated.  No Hillman catheter was placed.  The patient was laid prone on the   OSI table using 6 posts, pressure points carefully padded.  The back was   shaved, sterilely prepped and draped.  Planned incision was marked in midline   of the spinous processes of 3 and 4.  Skin was infiltrated with local,   dissected out the spinous processes and  lamina of 3 and 4 bilaterally.  Deep   Gelpi retractor was placed.  Levels confirmed with intraoperative fluoroscopy.    Using Midas Alexander drill, I did a bilateral decompression from a unilateral   left side approach undercutting the base of the spinous process, removing the   inferior portion of lamina 3, superior portion of lamina 4, plus the medial   facet on the left side.  Operating microscope was brought in.  Under high   power magnification, I identified the 4 roots.  We did a foraminotomy over the   4 root, then working proximally, the disk was identified, then the ligament   was incised and a large free fragment was removed in several pieces with small   pituitary rongeurs and micro cup forceps.  Once no further nuclear material   was forthcoming, the thecal sac was well decompressed as well as the 4 root.    I did not appreciate any significant annular defect.  I did not enter the disk   space.  Once decompression was completed, wound was irrigated with antibiotic   irrigation.  Medium Hemovac drain was placed in depth of wound, brought out   through a separate stab incision.  Deep fascia was closed with 0 Vicryl,   subcutaneous fascia with 0 Vicryl, subcuticular closed with 3-0 Vicryl, and   skin edges approximated with quarter-inch Steri-Strips.  Sterile dressing was   placed.  The patient was laid supine on the bed, extubated, taken to recovery   room in satisfactory condition.  The patient tolerated the procedure well   without apparent complication.    ESTIMATED BLOOD LOSS:  100 mL.             ____________________________________     MD JANES PRADHAN / YENNI    DD:  01/23/2020 08:48:52  DT:  01/23/2020 10:25:07    D#:  [id number]  Job#:  [id number]    cc: WATSON DONATO DO

## 2020-01-24 PROCEDURE — 700102 HCHG RX REV CODE 250 W/ 637 OVERRIDE(OP): Performed by: NURSE PRACTITIONER

## 2020-01-24 PROCEDURE — 700102 HCHG RX REV CODE 250 W/ 637 OVERRIDE(OP): Performed by: HOSPITALIST

## 2020-01-24 PROCEDURE — A9270 NON-COVERED ITEM OR SERVICE: HCPCS | Performed by: NURSE PRACTITIONER

## 2020-01-24 PROCEDURE — G0378 HOSPITAL OBSERVATION PER HR: HCPCS

## 2020-01-24 PROCEDURE — 700102 HCHG RX REV CODE 250 W/ 637 OVERRIDE(OP): Performed by: INTERNAL MEDICINE

## 2020-01-24 PROCEDURE — 700112 HCHG RX REV CODE 229: Performed by: NURSE PRACTITIONER

## 2020-01-24 PROCEDURE — 97161 PT EVAL LOW COMPLEX 20 MIN: CPT

## 2020-01-24 PROCEDURE — A9270 NON-COVERED ITEM OR SERVICE: HCPCS | Performed by: INTERNAL MEDICINE

## 2020-01-24 PROCEDURE — A9270 NON-COVERED ITEM OR SERVICE: HCPCS | Performed by: HOSPITALIST

## 2020-01-24 PROCEDURE — 99225 PR SUBSEQUENT OBSERVATION CARE,LEVEL II: CPT | Performed by: INTERNAL MEDICINE

## 2020-01-24 PROCEDURE — 700111 HCHG RX REV CODE 636 W/ 250 OVERRIDE (IP): Performed by: EMERGENCY MEDICINE

## 2020-01-24 PROCEDURE — 97165 OT EVAL LOW COMPLEX 30 MIN: CPT

## 2020-01-24 PROCEDURE — 700111 HCHG RX REV CODE 636 W/ 250 OVERRIDE (IP): Performed by: NURSE PRACTITIONER

## 2020-01-24 RX ORDER — OXYCODONE HYDROCHLORIDE AND ACETAMINOPHEN 5; 325 MG/1; MG/1
1-2 TABLET ORAL EVERY 4 HOURS PRN
Status: DISCONTINUED | OUTPATIENT
Start: 2020-01-24 | End: 2020-01-27 | Stop reason: HOSPADM

## 2020-01-24 RX ORDER — HALOPERIDOL 5 MG/1
5 TABLET ORAL 3 TIMES DAILY
Status: DISCONTINUED | OUTPATIENT
Start: 2020-01-24 | End: 2020-01-27 | Stop reason: HOSPADM

## 2020-01-24 RX ORDER — HALOPERIDOL 5 MG/1
5 TABLET ORAL EVERY 6 HOURS PRN
Status: DISCONTINUED | OUTPATIENT
Start: 2020-01-24 | End: 2020-01-24

## 2020-01-24 RX ADMIN — GABAPENTIN 600 MG: 300 CAPSULE ORAL at 19:39

## 2020-01-24 RX ADMIN — NICOTINE TRANSDERMAL SYSTEM 21 MG: 21 PATCH, EXTENDED RELEASE TRANSDERMAL at 04:25

## 2020-01-24 RX ADMIN — METHOCARBAMOL TABLETS 750 MG: 750 TABLET, COATED ORAL at 18:12

## 2020-01-24 RX ADMIN — OXYCODONE AND ACETAMINOPHEN 2 TABLET: 5; 325 TABLET ORAL at 16:11

## 2020-01-24 RX ADMIN — OXYCODONE AND ACETAMINOPHEN 2 TABLET: 5; 325 TABLET ORAL at 20:25

## 2020-01-24 RX ADMIN — PREDNISONE 60 MG: 20 TABLET ORAL at 04:23

## 2020-01-24 RX ADMIN — GABAPENTIN 600 MG: 300 CAPSULE ORAL at 12:56

## 2020-01-24 RX ADMIN — Medication: at 04:19

## 2020-01-24 RX ADMIN — HALOPERIDOL 5 MG: 5 TABLET ORAL at 16:12

## 2020-01-24 RX ADMIN — GABAPENTIN 600 MG: 300 CAPSULE ORAL at 04:23

## 2020-01-24 RX ADMIN — SENNOSIDES AND DOCUSATE SODIUM 1 TABLET: 8.6; 5 TABLET ORAL at 19:39

## 2020-01-24 RX ADMIN — DOCUSATE SODIUM 100 MG: 100 CAPSULE, LIQUID FILLED ORAL at 04:23

## 2020-01-24 RX ADMIN — HALOPERIDOL 5 MG: 5 TABLET ORAL at 11:44

## 2020-01-24 RX ADMIN — OXYCODONE AND ACETAMINOPHEN 2 TABLET: 5; 325 TABLET ORAL at 11:44

## 2020-01-24 RX ADMIN — TRIHEXYPHENIDYL HYDROCHLORIDE 4 MG: 2 TABLET ORAL at 19:39

## 2020-01-24 RX ADMIN — HALOPERIDOL 5 MG: 5 TABLET ORAL at 04:25

## 2020-01-24 ASSESSMENT — COGNITIVE AND FUNCTIONAL STATUS - GENERAL
HELP NEEDED FOR BATHING: A LITTLE
MOBILITY SCORE: 24
SUGGESTED CMS G CODE MODIFIER MOBILITY: CH
SUGGESTED CMS G CODE MODIFIER DAILY ACTIVITY: CI
DAILY ACTIVITIY SCORE: 23

## 2020-01-24 ASSESSMENT — ACTIVITIES OF DAILY LIVING (ADL): TOILETING: INDEPENDENT

## 2020-01-24 ASSESSMENT — ENCOUNTER SYMPTOMS
DIZZINESS: 0
WHEEZING: 0
HEADACHES: 0
TREMORS: 0
LOSS OF CONSCIOUSNESS: 0
TINGLING: 1
WEAKNESS: 0
SPUTUM PRODUCTION: 0
SEIZURES: 0
NERVOUS/ANXIOUS: 0
HEMOPTYSIS: 0
WEIGHT LOSS: 0
BLOOD IN STOOL: 0
DIARRHEA: 0
DOUBLE VISION: 0
PALPITATIONS: 0
BLURRED VISION: 0
FEVER: 0
FALLS: 0
BACK PAIN: 1
COUGH: 0
NECK PAIN: 0
ABDOMINAL PAIN: 0
VOMITING: 0
CONSTIPATION: 0
SORE THROAT: 0
PHOTOPHOBIA: 0
DEPRESSION: 0
CHILLS: 0
SINUS PAIN: 0
NAUSEA: 0
SHORTNESS OF BREATH: 0

## 2020-01-24 ASSESSMENT — GAIT ASSESSMENTS
DISTANCE (FEET): 200
GAIT LEVEL OF ASSIST: SUPERVISED
ASSISTIVE DEVICE: FRONT WHEEL WALKER

## 2020-01-24 ASSESSMENT — PATIENT HEALTH QUESTIONNAIRE - PHQ9
1. LITTLE INTEREST OR PLEASURE IN DOING THINGS: NOT AT ALL
SUM OF ALL RESPONSES TO PHQ9 QUESTIONS 1 AND 2: 0
2. FEELING DOWN, DEPRESSED, IRRITABLE, OR HOPELESS: NOT AT ALL

## 2020-01-24 NOTE — PROGRESS NOTES
Surgery patient?: yes  Date of surgery: 1/23/2020  Ambulated 50 ft on day of surgery? (N/A if today is not date of surgery): n/a  Number of times ambulated 50 feet or greater today: 4  Patient has been up to chair, edge of bed or HOB 90 degrees for all meals?:yes   Goal met? (goal is ambulating at least 50 feet 2 times on day shift, one time on night shift): yes  If patient did not meet mobility goal, why?:

## 2020-01-24 NOTE — THERAPY
"Occupational Therapy Evaluation completed.   Functional Status:  Pt presents to skilled OT services following bilateral L3-4 partial lami, and decompression of thecal sac and L4 nerve root, no brace ordered. Pt performed bed mobility, LB dressing, ambulated to BR, toilet t/f, toileting, standing h/g tasks with supervision with good follow through with lumbar spine precautions post education and training in ADL modifications. Pt reports she has limited support in the community, but reports will need a day pass to stay in the shelter during daytime, will need SC, FWW and can use microwave there to make light meals, reported no concerns discharging to shelter once medically cleared. Pt appears close to her functional baseline and doesn't demonstrate the need for further acute OT needs indicated after initial session.   Plan of Care: Patient with no further skilled OT needs in the acute care setting at this time  Discharge Recommendations:  Equipment: Front-Wheel Walker, Shower Chair and Grab Bars. Post-acute therapy Patient will not be actively followed for occupational therapy services at this time, however may be seen if requested by physician for 1 more visit within 30 days to address any discharge or equipment needs. NO HH OT needed.       See \"Rehab Therapy-Acute\" Patient Summary Report for complete documentation.    "

## 2020-01-24 NOTE — DIETARY
NUTRITION SERVICES: BMI - Pt with BMI >40 (=Body mass index is 41.86 kg/m².), morbid obesity. Weight loss counseling not appropriate in acute care setting. RECOMMEND - Referral to outpatient nutrition services for weight management after D/C.

## 2020-01-24 NOTE — PROGRESS NOTES
2 RN skin check done, no breakdown noted on skin, on bony prominences and under devices, incision CDI. Patient turns self.

## 2020-01-24 NOTE — CARE PLAN
Problem: Safety  Goal: Will remain free from injury  Outcome: PROGRESSING AS EXPECTED  Note:   Safety precaution in effect. Call light within reach. Reminded patient to call for assist. Non skid socks in use. Hourly rounds in effect. Verbalized understanding.     Problem: Infection  Goal: Will remain free from infection  Outcome: PROGRESSING AS EXPECTED  Note:   Implement Standard precaution. Hand washing every encounter & before & after patient care. Verbalized understanding.     Problem: Knowledge Deficit  Goal: Knowledge of disease process/condition, treatment plan, diagnostic tests, and medications will improve  Outcome: PROGRESSING AS EXPECTED  Note:   Discussed Plan of care. PT & OT Eval. Questions answered. Verbalized understanding.     Problem: Pain Management  Goal: Pain level will decrease to patient's comfort goal  Outcome: PROGRESSING AS EXPECTED  Note:   Educated on pain scale. Encouraged patient to verbalize pain. On Morphine PCA. Will medicate as per MAR.

## 2020-01-24 NOTE — THERAPY
"Physical Therapy Evaluation completed.   Bed Mobility:  Supine to Sit: Supervised(good log roll)  Transfers: Sit to Stand: Supervised  Gait: Level Of Assist: Supervised with Front-Wheel Walker       Plan of Care: Patient with no further skilled PT needs in the acute care setting at this time  Discharge Recommendations: Equipment: Front-Wheel Walker. Post-acute therapy Currently anticipate no further skilled therapy needs once patient is discharged from the inpatient setting.    Pt presents s/p lumbar surgery. Today, pt shows good understanding of spinal precautions/log roll from prior surgeries. Pt is supervised for OOB, transfers and ambulation x 200 feet using FWW. Pt is staying at  homeless shelter locally. Pt's son lives in [place], but pt unable to stay with him. Pt may need case mgmt assist for d/c options. Patient will not be actively followed for physical therapy services at this time, however may be seen if requested by physician for 1 more visit within 30 days to address any discharge or equipment needs    See \"Rehab Therapy-Acute\" Patient Summary Report for complete documentation.     "

## 2020-01-24 NOTE — PROGRESS NOTES
0650 Patient's in bed. Bedside report received from KRYSTLE Taylor RN at the beginning of the shift.     0720 Patient's sitting up in bed. Rates back pain 6/10, on Morphine PCA. Educated on the importance/use of IS at least 10x every hour while awake, able to reach 2500. Report BLE numbness and tingling  getting better per patient. IVF patent & infusing. Fall protocol in effect. cAll light within reach. Reminded patient to call for assist. Assessment completed. No distress noted. Plan of care reviewed with the patient. Verbalized understanding.    0915 RAYMOND Alvarez visited. POC discussed with the patient.    0920 Dr Camarena visited. POC discussed with the patient.    0927 New order received and acknowledged for the IVF and Morphine PCA to be dc' from RAYMOND Alvarez.    1006 IVF and Morphine PCA dc'd/stopped as per order    1100  PATRICIA Esteban working with the patient. Ambulated patient in the hallway with fWW.    1144 Medicated with Percocet (see MAR) for c/o's back pain, rates pain 6/10. Cold pack given.    1158 Placed a call to RAYMOND Alvarez. Awaiting for response.    1222 Received a call from RAYMOND Alvarez. Stated that It's OK for OT to eval patient as per Dr Camarena's order.    1300 Patient's sitting up in a chair, having lunch.    1400 Mirna, OT working with the patient.    1612 Medicated with Percocet (see MAR) for c/o's back pain, rates pain 6/10. Cold pack given.    1812 Patient's sitting up in bed, having Dinner. Medicated with Robaxin(see MAR) for c/o's back pain, rates pain 4/10. Cold pack given.

## 2020-01-24 NOTE — PROGRESS NOTES
Surgery patient?: yes  Date of surgery: 1/23/2020  Ambulated 50 ft on day of surgery? (N/A if today is not date of surgery): n/a  Number of times ambulated 50 feet or greater today: 1  Patient has been up to chair, edge of bed or HOB 90 degrees for all meals?: yes  Goal met? (goal is ambulating at least 50 feet 2 times on day shift, one time on night shift): no  If patient did not meet mobility goal, why?: new admit from [facility] 6 @6267

## 2020-01-24 NOTE — PROGRESS NOTES
[facility] Medicine Daily Progress Note    Date of Service  1/23/2020    Chief Complaint  39 y.o. female admitted 1/20/2020 with acute on chronic back pain    Hospital Course    Ms. Cira Ferguson is a 39 y.o. female with multiple prior back surgeries who presented with acute on chronic back pain.  Had acute exacerbation of back pain in December after dancing all night at a [employer] concert.  Was seen at hospital in [place] and no surgery was recommended.  On admission to [facility] she reports her back pain radiates down bilateral lower extremities.  No red flags.  Neurosurgery was consulted Dr. Garcia.  Status post bilateral L3-L4 partial laminectomies, left   medial facetectomy, microscopic excision of herniated disk, decompression of thecal sac and left L4 nerve root 1/23/2020.  Patient is now on Morphine PCA and moved to neurosurgery.        Interval Problem Update  Patient was seen and examined at bedside.  There were no acute events overnight.  I have personally reviewed vitals, labs, and imaging.  Patient denies fever, chills, chest pain, shortness of breath.      Back pain is improved after surgery.  Reports her numbness is significantly improved and she can feel better on her right lower extremity more than left.      Consultants/Specialty  Neurosurgery    Code Status  FULL    Disposition  TBD    Review of Systems  Review of Systems   Constitutional: Negative for chills and fever.   HENT: Negative for congestion, sinus pain and sore throat.    Eyes: Negative for blurred vision.   Respiratory: Negative for cough, hemoptysis, sputum production, shortness of breath and wheezing.    Cardiovascular: Negative for chest pain, palpitations and leg swelling.   Gastrointestinal: Negative for abdominal pain, blood in stool, constipation, diarrhea, nausea and vomiting.   Genitourinary: Negative for dysuria, frequency, hematuria and urgency.   Musculoskeletal: Positive for back pain. Negative for falls.   Skin: Negative  for rash.   Neurological: Positive for tingling. Negative for dizziness, seizures, loss of consciousness, weakness and headaches.   Psychiatric/Behavioral: Negative for depression and suicidal ideas. The patient is not nervous/anxious.    All other systems reviewed and are negative.       Physical Exam  Temp:  [35.8 °C (96.5 °F)-37.1 °C (98.8 °F)] 36.3 °C (97.4 °F)  Pulse:  [54-87] 59  Resp:  [16-18] 16  BP: ()/(48-79) 114/55  SpO2:  [91 %-98 %] 95 %    Physical Exam  Vitals signs and nursing note reviewed.   Constitutional:       General: She is not in acute distress.     Appearance: Normal appearance.   HENT:      Head: Normocephalic and atraumatic.      Nose: Nose normal.      Mouth/Throat:      Mouth: Mucous membranes are moist.      Pharynx: Oropharynx is clear. No oropharyngeal exudate or posterior oropharyngeal erythema.   Eyes:      Extraocular Movements: Extraocular movements intact.      Conjunctiva/sclera: Conjunctivae normal.   Neck:      Musculoskeletal: Normal range of motion and neck supple.   Cardiovascular:      Rate and Rhythm: Normal rate and regular rhythm.      Pulses: Normal pulses.      Heart sounds: Normal heart sounds. No murmur.   Pulmonary:      Effort: Pulmonary effort is normal. No respiratory distress.      Breath sounds: Normal breath sounds. No stridor. No wheezing or rales.   Abdominal:      General: Abdomen is flat. Bowel sounds are normal. There is no distension.      Palpations: Abdomen is soft. There is no mass.      Tenderness: There is no tenderness.   Skin:     General: Skin is warm.      Capillary Refill: Capillary refill takes less than 2 seconds.   Neurological:      General: No focal deficit present.      Mental Status: She is alert and oriented to person, place, and time. Mental status is at baseline.      Cranial Nerves: No cranial nerve deficit.      Motor: No weakness.   Psychiatric:         Mood and Affect: Mood normal.         Behavior: Behavior normal.          Fluids    Intake/Output Summary (Last 24 hours) at 1/23/2020 1742  Last data filed at 1/23/2020 1540  Gross per 24 hour   Intake 1490 ml   Output 860 ml   Net 630 ml       Laboratory  Recent Labs     01/22/20  0948 01/23/20  0332   WBC 9.4 11.0*   RBC 4.86 4.38   HEMOGLOBIN 14.5 13.1   HEMATOCRIT 45.2 39.6   MCV 93.0 90.4   MCH 29.8 29.9   MCHC 32.1* 33.1*   RDW 41.8 39.5   PLATELETCT 264 267   MPV 9.4 9.5     Recent Labs     01/22/20  0948 01/23/20  0332   SODIUM 139 140   POTASSIUM 4.1 4.0   CHLORIDE 106 106   CO2 24 26   GLUCOSE 104* 82   BUN 16 16   CREATININE 0.98 0.82   CALCIUM 9.6 9.1     Recent Labs     01/23/20  0328   APTT 22.7*   INR 0.99               Imaging  DX-PORTABLE FLUORO > 1 HOUR   Final Result      Portable fluoroscopy utilized for 1 second.         INTERPRETING LOCATION: [address] [place] [address], [facility]      DX-SPINE-ANY ONE VIEW   Final Result      Fluoroscopic image(s) obtained during lumbar spinal instrumentation. Please see the patient's chart for full procedural details.      Fluoroscopy time 1 second.         DX-CHEST-2 VIEWS   Final Result      No evidence of acute cardiopulmonary disease      MR-LUMBAR SPINE-W/O   Final Result      1.  Previous laminectomy and bilateral pedicle screw and fatoumata fixation at the L5-S1 level. The right-sided pedicle screw at the L5 level courses medial to the right-sided pedicle.      2.  Grade 1 spondylolisthesis at the L5-S1 level.      3.  Large left paracentral disc extrusion at the L3-4 level markedly compressing the left ventral surface of thecal sac and the left L4 nerve root in its lateral recess.      4.  Small left paracentral disc extrusion at the L4-5 level effacing left ventral surface of thecal sac.      5.  Mild central canal stenosis at the L3-4 level secondary to facet arthropathy.      6.  Minimal to mild multilevel lumbar spondylotic change.      OUTSIDE IMAGES-CT LUMBAR SPINE   Final Result           Assessment/Plan  * Lumbar nerve  root compression- (present on admission)  Assessment & Plan  MRI lumbar spine shows malpositioned right L5 pedicle screw/solid L5/S1 fusion.  Status post Microscopic bilateral L3-L4 partial laminectomies, left medial facetectomy, microscopic excision of herniated disk, decompression of thecal sac and left L4 nerve root by Dr. Garcia 1/23/2020  Gabapentin 600 mg 3 times daily  On Morphine PCA post surgery    Chronic hepatitis C without hepatic coma (HCC)- (present on admission)  Assessment & Plan  Noted per history    Tobacco dependence- (present on admission)  Assessment & Plan  -counseled for more than 10 minutes on tobacco cessation [id number]   -I have ordered nicotine replacement therapy      Schizoaffective disorder (HCC)- (present on admission)  Assessment & Plan  Continue trihexyphenidyl  Haldol PRN agitation  Monitor  Consider psychology consult    History of DVT (deep vein thrombosis)- (present on admission)  Assessment & Plan  Will restart when cleared by surgery    Back pain- (present on admission)  Assessment & Plan  Acute on chronic  Neurosurgery consulted Dr. Garcia  Morphine PCA as above  On prednisone 60 mg daily       Gastrointestinal prophylaxis: The patient has no risk factors for developing gastric ulcers or gastritis.  As the patient is tolerating oral intake, GI prophylaxis is not indicated at this time.  Antibiotics: Perioerpative Cefazolin  Diet: Cardiac  Code status: FULL  Prognosis: Guarded  Risk: The Patient is at HIGH risk for complications and decompensation secondary to her multiple cormorbidities including acute on chronic back pain with multiple back surgeries requiring IV pain medication, Morphine PCA, hepatitis C, tobacco abuse    I have personally reviewed notes, labs, vitals, imaging.  I discussed the plan of care with bedside RN as well as on multidisciplinary rounds    I have performed a physical exam and reviewed and updated ROS and Plan today (1/23/2020). In review of yesterday's  note (1/22/2020), there are no changes except as documented above.       VTE prophylaxis: SCDs.

## 2020-01-24 NOTE — PROGRESS NOTES
Neurosurgery Progress Note    Subjective:  States LLE significantly improved.  Eating, min amb, voiding, PCA    Exam:  BLE grossly intact with coaching, some giveaway on left. CDI with hvac 40    BP  Min: 97/63  Max: 122/68  Pulse  Av.2  Min: 54  Max: 86  Resp  Av.4  Min: 16  Max: 18  Temp  Av.3 °C (97.4 °F)  Min: 35.8 °C (96.5 °F)  Max: 37.1 °C (98.8 °F)  SpO2  Av.5 %  Min: 90 %  Max: 98 %    No data recorded    Recent Labs     20  0948 20  0332   WBC 9.4 11.0*   RBC 4.86 4.38   HEMOGLOBIN 14.5 13.1   HEMATOCRIT 45.2 39.6   MCV 93.0 90.4   MCH 29.8 29.9   MCHC 32.1* 33.1*   RDW 41.8 39.5   PLATELETCT 264 267   MPV 9.4 9.5     Recent Labs     20  0948 20  0332   SODIUM 139 140   POTASSIUM 4.1 4.0   CHLORIDE 106 106   CO2 24 26   GLUCOSE 104* 82   BUN 16 16   CREATININE 0.98 0.82   CALCIUM 9.6 9.1     Recent Labs     20  0328   APTT 22.7*   INR 0.99     Recent Labs     20  0328   REACTMIN 2.2*   CLOTKINET 1.9   CLOTANGL 66.0   MAXCLOTS 61.1   [id number] 0.0   PRCINADP 0.0   PRCINAA 23.6       Intake/Output       20 0700 - 20 0659 20 07 - 20 0659      [id number] 7494-1971 Total 5875-0907 3780-8469 Total       Intake    P.O.  300  480 780  480  -- 480    P.O. 300 480 780 480 -- 480    I.V.  838  -- 838  18.8  -- 18.8    PCA End of Shift Total Volume (ml) 38 -- 38 18.8 -- 18.8    Volume (mL) (Lactated Ringers) 800 -- 800 -- -- --    Total Intake 7370 252 1171 498.8 -- 498.8       Output    Urine  800  -- 800  --  -- --    Number of Times Voided 1 x 2 x 3 x 1 x -- 1 x    Urine Void (mL) 800 -- 800 -- -- --    Drains  0  100 100  --  -- --    Output (mL) (Closed/Suction Drain 1 Posterior Back Hemovac) 0 100 100 -- -- --    Blood  60  -- 60  --  -- --    Est. Blood Loss 60 -- 60 -- -- --    Total Output 860 100 960 -- -- --       Net I/O     278 380 658 498.8 -- 498.8            Intake/Output Summary (Last 24 hours) at 2020 8339  Last data  filed at 1/24/2020 0900  Gross per 24 hour   Intake 1316.8 ml   Output 900 ml   Net 416.8 ml            • Pharmacy Consult Request  1 Each PHARMACY TO DOSE   • MD DELCID...DO NOT ADMINISTER NSAIDS or ASPIRIN unless ORDERED By Neurosurgery  1 Each PRN   • docusate sodium  100 mg BID   • senna-docusate  1 Tab Nightly   • senna-docusate  1 Tab Q24HRS PRN   • polyethylene glycol/lytes  1 Packet BID PRN   • magnesium hydroxide  30 mL QDAY PRN   • bisacodyl  10 mg Q24HRS PRN   • fleet  1 Each Once PRN   • 0.9 % NaCl with KCl 20 mEq 1,000 mL   Continuous   • morphine   Continuous   • diphenhydrAMINE  25 mg Q6HRS PRN    Or   • diphenhydrAMINE  25 mg Q6HRS PRN   • ondansetron  4 mg Q4HRS PRN   • ondansetron  4 mg Q4HRS PRN   • promethazine  12.5-25 mg Q4HRS PRN   • promethazine  12.5-25 mg Q4HRS PRN   • prochlorperazine  5-10 mg Q4HRS PRN   • methocarbamol  750 mg Q8HRS PRN    Or   • cyclobenzaprine  10 mg Q8HRS PRN    Or   • diazePAM  5 mg Q4HRS PRN   • nicotine  21 mg Daily-0600   • predniSONE  60 mg DAILY   • gabapentin  600 mg TID   • haloperidol  5 mg TID   • trihexyphenidyl  4 mg QHS   • acetaminophen  650 mg Q6HRS PRN   • enalaprilat  1.25 mg Q6HRS PRN   • lidocaine  1 Patch Q24HR       Assessment and Plan:  POD #1 L3-4 Lami/disc  Prophylactic anticoagulation: no         Start date/time: tbd    PTOT  CM- lives in shelter  DC pca- start orals  DC planning

## 2020-01-24 NOTE — PROGRESS NOTES
1850  Patient's in bed. No changes in status. Bedside report given to Oncoming NOC RN (Claudia SAPP).

## 2020-01-24 NOTE — PROGRESS NOTES
[facility] Medicine Daily Progress Note    Date of Service  1/24/2020    Chief Complaint  39 y.o. female admitted 1/20/2020 with acute on chronic back pain    Hospital Course    Ms. Cira Ferguson is a 39 y.o. female with multiple prior back surgeries who presented with acute on chronic back pain.  Had acute exacerbation of back pain in December after dancing all night at a [employer] concert.  Was seen at hospital in [place] and no surgery was recommended.  On admission to [facility] she reports her back pain radiates down bilateral lower extremities.  No red flags.  Neurosurgery was consulted Dr. Garcia.  Status post bilateral L3-L4 partial laminectomies, left   medial facetectomy, microscopic excision of herniated disk, decompression of thecal sac and left L4 nerve root 1/23/2020.          Interval Problem Update  Patient was seen and examined at bedside.    Patient stated lower back significantly improving.  She had bowel movement yesterday last time.  She has no difficulty with urination.  PCA pump discontinued by neurosurgery.  She was started on oral medications.  PT evaluation pending.      Consultants/Specialty  Neurosurgery    Code Status  FULL    Disposition  PT OT evaluation pending    Review of Systems  Review of Systems   Constitutional: Negative for chills, fever and weight loss.   HENT: Negative for congestion, sinus pain and sore throat.    Eyes: Negative for blurred vision, double vision and photophobia.   Respiratory: Negative for cough, hemoptysis, sputum production, shortness of breath and wheezing.    Cardiovascular: Negative for chest pain, palpitations and leg swelling.   Gastrointestinal: Negative for abdominal pain, blood in stool, constipation, diarrhea, nausea and vomiting.   Genitourinary: Negative for dysuria, frequency, hematuria and urgency.   Musculoskeletal: Positive for back pain. Negative for falls, joint pain and neck pain.   Skin: Negative for rash.   Neurological: Positive for  tingling. Negative for dizziness, tremors, seizures, loss of consciousness, weakness and headaches.   Psychiatric/Behavioral: Negative for depression and suicidal ideas. The patient is not nervous/anxious.    All other systems reviewed and are negative.       Physical Exam  Temp:  [35.8 °C (96.5 °F)-37.1 °C (98.8 °F)] 36 °C (96.8 °F)  Pulse:  [54-86] 59  Resp:  [16-17] 16  BP: ()/(54-71) 98/55  SpO2:  [90 %-96 %] 94 %    Physical Exam  Vitals signs and nursing note reviewed.   Constitutional:       General: She is not in acute distress.     Appearance: Normal appearance.   HENT:      Head: Normocephalic and atraumatic.      Nose: Nose normal.      Mouth/Throat:      Mouth: Mucous membranes are moist.      Pharynx: Oropharynx is clear. No oropharyngeal exudate or posterior oropharyngeal erythema.   Eyes:      Extraocular Movements: Extraocular movements intact.      Conjunctiva/sclera: Conjunctivae normal.   Neck:      Musculoskeletal: Normal range of motion and neck supple.   Cardiovascular:      Rate and Rhythm: Normal rate and regular rhythm.      Pulses: Normal pulses.      Heart sounds: Normal heart sounds. No murmur.   Pulmonary:      Effort: Pulmonary effort is normal. No respiratory distress.      Breath sounds: Normal breath sounds. No stridor. No wheezing or rales.   Abdominal:      General: Abdomen is flat. Bowel sounds are normal. There is no distension.      Palpations: Abdomen is soft. There is no mass.      Tenderness: There is no tenderness.   Musculoskeletal:      Comments: Tenderness to palpation paraspinal area at L3-L4.  Surgical site CDI   Skin:     General: Skin is warm.      Capillary Refill: Capillary refill takes less than 2 seconds.   Neurological:      General: No focal deficit present.      Mental Status: She is alert and oriented to person, place, and time. Mental status is at baseline.      Cranial Nerves: No cranial nerve deficit.      Motor: No weakness.   Psychiatric:          Mood and Affect: Mood normal.         Behavior: Behavior normal.         Fluids    Intake/Output Summary (Last 24 hours) at 1/24/2020 1045  Last data filed at 1/24/2020 1006  Gross per 24 hour   Intake 1332.8 ml   Output 900 ml   Net 432.8 ml       Laboratory  Recent Labs     01/22/20  0948 01/23/20  0332   WBC 9.4 11.0*   RBC 4.86 4.38   HEMOGLOBIN 14.5 13.1   HEMATOCRIT 45.2 39.6   MCV 93.0 90.4   MCH 29.8 29.9   MCHC 32.1* 33.1*   RDW 41.8 39.5   PLATELETCT 264 267   MPV 9.4 9.5     Recent Labs     01/22/20  0948 01/23/20  0332   SODIUM 139 140   POTASSIUM 4.1 4.0   CHLORIDE 106 106   CO2 24 26   GLUCOSE 104* 82   BUN 16 16   CREATININE 0.98 0.82   CALCIUM 9.6 9.1     Recent Labs     01/23/20  0328   APTT 22.7*   INR 0.99               Imaging  DX-PORTABLE FLUORO > 1 HOUR   Final Result      Portable fluoroscopy utilized for 1 second.         INTERPRETING LOCATION: [address] [place] [address], [facility]      DX-SPINE-ANY ONE VIEW   Final Result      Fluoroscopic image(s) obtained during lumbar spinal instrumentation. Please see the patient's chart for full procedural details.      Fluoroscopy time 1 second.         DX-CHEST-2 VIEWS   Final Result      No evidence of acute cardiopulmonary disease      MR-LUMBAR SPINE-W/O   Final Result      1.  Previous laminectomy and bilateral pedicle screw and fatoumata fixation at the L5-S1 level. The right-sided pedicle screw at the L5 level courses medial to the right-sided pedicle.      2.  Grade 1 spondylolisthesis at the L5-S1 level.      3.  Large left paracentral disc extrusion at the L3-4 level markedly compressing the left ventral surface of thecal sac and the left L4 nerve root in its lateral recess.      4.  Small left paracentral disc extrusion at the L4-5 level effacing left ventral surface of thecal sac.      5.  Mild central canal stenosis at the L3-4 level secondary to facet arthropathy.      6.  Minimal to mild multilevel lumbar spondylotic change.      OUTSIDE IMAGES-CT  LUMBAR SPINE   Final Result           Assessment/Plan  * Lumbar nerve root compression- (present on admission)  Assessment & Plan  MRI lumbar spine shows malpositioned right L5 pedicle screw/solid L5/S1 fusion.  Status post Microscopic bilateral L3-L4 partial laminectomies, left medial facetectomy, microscopic excision of herniated disk, decompression of thecal sac and left L4 nerve root by Dr. Garcia 1/23/2020  Gabapentin 600 mg 3 times daily  Methocarbamol, Flexeril, Valium  oxycodone as needed, status post PCA pump, discontinued 1/24    Chronic hepatitis C without hepatic coma (HCC)- (present on admission)  Assessment & Plan  Noted per history    Tobacco dependence- (present on admission)  Assessment & Plan  Cessation counseling provided      Schizoaffective disorder (HCC)- (present on admission)  Assessment & Plan  Appears to be stable.   Continue trihexyphenidyl  Scheduled Haldol  Monitor  Follow-up with mental health as outpatient    History of DVT (deep vein thrombosis)- (present on admission)  Assessment & Plan  History of right popliteal vein DVT in 2012  Not on anticoagulation as outpatient  Currently off heparin.  Restart DVT prophylaxis when okay per surgery    Back pain- (present on admission)  Assessment & Plan  status post bilateral L3-L4 partial laminectomies, left   medial facetectomy, microscopic excision of herniated disk, decompression of   thecal sac and left L4 nerve root on 1/24  Lower back pain is improving  On prednisone 60 mg daily; will taper down  PCA pump discontinued, transition to oral oxycodone as needed, continue methocarbamol, Flexeril  PT OT evaluation pending     Gastrointestinal prophylaxis: The patient has no risk factors for developing gastric ulcers or gastritis.  As the patient is tolerating oral intake, GI prophylaxis is not indicated at this time.  Antibiotics: Perioerpative Cefazolin  Diet: Cardiac  Code status: FULL  Prognosis: Guarded  Risk: The Patient is at HIGH risk  for complications and decompensation secondary to her multiple cormorbidities including acute on chronic back pain with multiple back surgeries requiring IV pain medication,   hepatitis C, tobacco abuse    I have personally reviewed notes, labs, vitals, imaging.  I discussed the plan of care with bedside RN as well as on multidisciplinary rounds    I have performed a physical exam and reviewed and updated ROS and Plan today (1/24/2020). In review of yesterday's note (1/23/2020), there are no changes except as documented above.     VTE prophylaxis: SCDs.

## 2020-01-24 NOTE — CARE PLAN
Problem: Communication  Goal: The ability to communicate needs accurately and effectively will improve  Outcome: PROGRESSING AS EXPECTED  Note:   Effectively communicates needs with staff      Problem: Infection  Goal: Will remain free from infection  Outcome: PROGRESSING AS EXPECTED  Note:   Antibiotics administered as ordered      Problem: Pain Management  Goal: Pain level will decrease to patient's comfort goal  Outcome: PROGRESSING AS EXPECTED  Note:   Pain level within acceptable limits with use of PRN medications

## 2020-01-25 PROBLEM — D72.829 LEUKOCYTOSIS: Status: ACTIVE | Noted: 2020-01-25

## 2020-01-25 PROCEDURE — A9270 NON-COVERED ITEM OR SERVICE: HCPCS | Performed by: HOSPITALIST

## 2020-01-25 PROCEDURE — A9270 NON-COVERED ITEM OR SERVICE: HCPCS | Performed by: NURSE PRACTITIONER

## 2020-01-25 PROCEDURE — 700102 HCHG RX REV CODE 250 W/ 637 OVERRIDE(OP): Performed by: HOSPITALIST

## 2020-01-25 PROCEDURE — 99225 PR SUBSEQUENT OBSERVATION CARE,LEVEL II: CPT | Performed by: INTERNAL MEDICINE

## 2020-01-25 PROCEDURE — A9270 NON-COVERED ITEM OR SERVICE: HCPCS | Performed by: INTERNAL MEDICINE

## 2020-01-25 PROCEDURE — G0378 HOSPITAL OBSERVATION PER HR: HCPCS

## 2020-01-25 PROCEDURE — 700102 HCHG RX REV CODE 250 W/ 637 OVERRIDE(OP): Performed by: NURSE PRACTITIONER

## 2020-01-25 PROCEDURE — 700111 HCHG RX REV CODE 636 W/ 250 OVERRIDE (IP): Performed by: INTERNAL MEDICINE

## 2020-01-25 PROCEDURE — 700112 HCHG RX REV CODE 229: Performed by: NURSE PRACTITIONER

## 2020-01-25 PROCEDURE — 700102 HCHG RX REV CODE 250 W/ 637 OVERRIDE(OP): Performed by: INTERNAL MEDICINE

## 2020-01-25 RX ADMIN — SENNOSIDES AND DOCUSATE SODIUM 1 TABLET: 8.6; 5 TABLET ORAL at 20:12

## 2020-01-25 RX ADMIN — GABAPENTIN 600 MG: 300 CAPSULE ORAL at 20:12

## 2020-01-25 RX ADMIN — TRIHEXYPHENIDYL HYDROCHLORIDE 4 MG: 2 TABLET ORAL at 20:12

## 2020-01-25 RX ADMIN — METHOCARBAMOL TABLETS 750 MG: 750 TABLET, COATED ORAL at 02:18

## 2020-01-25 RX ADMIN — METHOCARBAMOL TABLETS 750 MG: 750 TABLET, COATED ORAL at 20:12

## 2020-01-25 RX ADMIN — OXYCODONE AND ACETAMINOPHEN 2 TABLET: 5; 325 TABLET ORAL at 00:12

## 2020-01-25 RX ADMIN — GABAPENTIN 600 MG: 300 CAPSULE ORAL at 12:36

## 2020-01-25 RX ADMIN — CYCLOBENZAPRINE 10 MG: 10 TABLET, FILM COATED ORAL at 23:22

## 2020-01-25 RX ADMIN — HALOPERIDOL 5 MG: 5 TABLET ORAL at 16:03

## 2020-01-25 RX ADMIN — DOCUSATE SODIUM 100 MG: 100 CAPSULE, LIQUID FILLED ORAL at 04:20

## 2020-01-25 RX ADMIN — HALOPERIDOL 5 MG: 5 TABLET ORAL at 04:20

## 2020-01-25 RX ADMIN — OXYCODONE AND ACETAMINOPHEN 2 TABLET: 5; 325 TABLET ORAL at 04:20

## 2020-01-25 RX ADMIN — HALOPERIDOL 5 MG: 5 TABLET ORAL at 11:58

## 2020-01-25 RX ADMIN — DOCUSATE SODIUM 100 MG: 100 CAPSULE, LIQUID FILLED ORAL at 16:03

## 2020-01-25 RX ADMIN — NICOTINE TRANSDERMAL SYSTEM 21 MG: 21 PATCH, EXTENDED RELEASE TRANSDERMAL at 04:19

## 2020-01-25 RX ADMIN — OXYCODONE AND ACETAMINOPHEN 2 TABLET: 5; 325 TABLET ORAL at 08:19

## 2020-01-25 RX ADMIN — OXYCODONE AND ACETAMINOPHEN 2 TABLET: 5; 325 TABLET ORAL at 12:36

## 2020-01-25 RX ADMIN — PREDNISONE 30 MG: 20 TABLET ORAL at 04:20

## 2020-01-25 RX ADMIN — OXYCODONE AND ACETAMINOPHEN 2 TABLET: 5; 325 TABLET ORAL at 17:22

## 2020-01-25 RX ADMIN — OXYCODONE AND ACETAMINOPHEN 2 TABLET: 5; 325 TABLET ORAL at 21:34

## 2020-01-25 RX ADMIN — GABAPENTIN 600 MG: 300 CAPSULE ORAL at 04:20

## 2020-01-25 ASSESSMENT — ENCOUNTER SYMPTOMS
BACK PAIN: 1
WEIGHT LOSS: 0
FEVER: 0
NAUSEA: 0
CONSTIPATION: 0
SINUS PAIN: 0
PHOTOPHOBIA: 0
TINGLING: 1
COUGH: 0
BLURRED VISION: 0
ABDOMINAL PAIN: 0
WEAKNESS: 0
SHORTNESS OF BREATH: 0
HEADACHES: 0
NECK PAIN: 0
DIARRHEA: 0
DOUBLE VISION: 0
WHEEZING: 0
DEPRESSION: 0
CHILLS: 0
VOMITING: 0
FALLS: 0
SORE THROAT: 0
TREMORS: 0
LOSS OF CONSCIOUSNESS: 0
NERVOUS/ANXIOUS: 0
DIZZINESS: 0
PALPITATIONS: 0
HEMOPTYSIS: 0
BLOOD IN STOOL: 0
SPUTUM PRODUCTION: 0
SEIZURES: 0

## 2020-01-25 NOTE — PROGRESS NOTES
Surgery patient?: yes  Date of surgery: 1/23/2020  Ambulated 50 ft on day of surgery? (N/A if today is not date of surgery): n/a  Number of times ambulated 50 feet or greater today: 5  Patient has been up to chair, edge of bed or HOB 90 degrees for all meals?: yes  Goal met? (goal is ambulating at least 50 feet 2 times on day shift, one time on night shift): yes  If patient did not meet mobility goal, why?:

## 2020-01-25 NOTE — PROGRESS NOTES
0650 Patient's in bed. Bedside report received from KRYSTLE Ramsey RN at the beginning of the shift.    0819 Patient's sitting up in bed, having Breakfast. Educated on the importance/use of IS at least 10x every hour while awake, able to reach 2500. Medicated with Percocet (see MAR) for c/o's back pain, rates pain 7/10. Ice pack provided. Fall Protocol in effect. Call light within reach. Reminded patient to call for assist. Assessment completed. No distress noted. Plan of care reviewed with the patient. Verbalized understanding.     0900 Dr Camarena visited. POC discussed with the patient.    1155 Patient's in bed. No distress noted.    1236 Medicated with Percocet (see MAR) for c/o's back pain, rates pain 8/10. Cold pack given.    1310 Patient's sitting up in a chair, having lunch. No distress noted.    1515 Patient's in bed. No distress noted.     1722  Medicated with Percocet (see MAR) for c/o's back pain, rates pain 8/10. Cold pack given.

## 2020-01-25 NOTE — PROGRESS NOTES
[facility] Medicine Daily Progress Note    Date of Service  1/25/2020    Chief Complaint  39 y.o. female admitted 1/20/2020 with acute on chronic back pain    Hospital Course    Ms. Cira Ferguson is a 39 y.o. female with multiple prior back surgeries who presented with acute on chronic back pain.  Had acute exacerbation of back pain in December after dancing all night at a [employer] concert.  Was seen at hospital in [place] and no surgery was recommended.  On admission to [facility] she reports her back pain radiates down bilateral lower extremities.  No red flags.  Neurosurgery was consulted Dr. Garcia.  Status post bilateral L3-L4 partial laminectomies, left   medial facetectomy, microscopic excision of herniated disk, decompression of thecal sac and left L4 nerve root 1/23/2020.          Interval Problem Update  Patient states lower back pain improved.  She ambulates with some assistance  White blood cells 11.0 today.  Denies cough chills fever runny nose sore throat, diarrhea, nausea     Consultants/Specialty  Neurosurgery    Code Status  FULL    Disposition  SNF for rehab, pending acceptance    Review of Systems  Review of Systems   Constitutional: Negative for chills, fever and weight loss.   HENT: Negative for congestion, sinus pain and sore throat.    Eyes: Negative for blurred vision, double vision and photophobia.   Respiratory: Negative for cough, hemoptysis, sputum production, shortness of breath and wheezing.    Cardiovascular: Negative for chest pain, palpitations and leg swelling.   Gastrointestinal: Negative for abdominal pain, blood in stool, constipation, diarrhea, nausea and vomiting.   Genitourinary: Negative for dysuria, frequency, hematuria and urgency.   Musculoskeletal: Positive for back pain. Negative for falls, joint pain and neck pain.   Skin: Negative for rash.   Neurological: Positive for tingling. Negative for dizziness, tremors, seizures, loss of consciousness, weakness and headaches.    Psychiatric/Behavioral: Negative for depression and suicidal ideas. The patient is not nervous/anxious.    All other systems reviewed and are negative.       Physical Exam  Temp:  [36.2 °C (97.2 °F)-36.8 °C (98.3 °F)] 36.3 °C (97.4 °F)  Pulse:  [61-71] 71  Resp:  [16-18] 16  BP: ()/(53-63) 97/57  SpO2:  [93 %-96 %] 93 %    Physical Exam  Vitals signs and nursing note reviewed.   Constitutional:       General: She is not in acute distress.     Appearance: Normal appearance.   HENT:      Head: Normocephalic and atraumatic.      Nose: Nose normal.      Mouth/Throat:      Mouth: Mucous membranes are moist.      Pharynx: Oropharynx is clear. No oropharyngeal exudate or posterior oropharyngeal erythema.   Eyes:      Extraocular Movements: Extraocular movements intact.      Conjunctiva/sclera: Conjunctivae normal.   Neck:      Musculoskeletal: Normal range of motion and neck supple.   Cardiovascular:      Rate and Rhythm: Normal rate and regular rhythm.      Pulses: Normal pulses.      Heart sounds: Normal heart sounds. No murmur.   Pulmonary:      Effort: Pulmonary effort is normal. No respiratory distress.      Breath sounds: Normal breath sounds. No stridor. No wheezing or rales.   Abdominal:      General: Abdomen is flat. Bowel sounds are normal. There is no distension.      Palpations: Abdomen is soft. There is no mass.      Tenderness: There is no tenderness.   Musculoskeletal:      Comments: Tenderness to palpation paraspinal area at L3-L4.  Surgical site CDI   Skin:     General: Skin is warm.      Capillary Refill: Capillary refill takes less than 2 seconds.   Neurological:      General: No focal deficit present.      Mental Status: She is alert and oriented to person, place, and time. Mental status is at baseline.      Cranial Nerves: No cranial nerve deficit.      Motor: No weakness.   Psychiatric:         Mood and Affect: Mood normal.         Behavior: Behavior normal.       I examined the patient 1/25.   No significant changes from exam from 1/24 noted except as documented above  Fluids    Intake/Output Summary (Last 24 hours) at 1/25/2020 1215  Last data filed at 1/25/2020 0930  Gross per 24 hour   Intake 1200 ml   Output 25 ml   Net 1175 ml       Laboratory  Recent Labs     01/23/20  0332   WBC 11.0*   RBC 4.38   HEMOGLOBIN 13.1   HEMATOCRIT 39.6   MCV 90.4   MCH 29.9   MCHC 33.1*   RDW 39.5   PLATELETCT 267   MPV 9.5     Recent Labs     01/23/20  0332   SODIUM 140   POTASSIUM 4.0   CHLORIDE 106   CO2 26   GLUCOSE 82   BUN 16   CREATININE 0.82   CALCIUM 9.1     Recent Labs     01/23/20  0328   APTT 22.7*   INR 0.99               Imaging  DX-PORTABLE FLUORO > 1 HOUR   Final Result      Portable fluoroscopy utilized for 1 second.         INTERPRETING LOCATION: [address] [place] [address] [place], [facility]      DX-SPINE-ANY ONE VIEW   Final Result      Fluoroscopic image(s) obtained during lumbar spinal instrumentation. Please see the patient's chart for full procedural details.      Fluoroscopy time 1 second.         DX-CHEST-2 VIEWS   Final Result      No evidence of acute cardiopulmonary disease      MR-LUMBAR SPINE-W/O   Final Result      1.  Previous laminectomy and bilateral pedicle screw and fatoumata fixation at the L5-S1 level. The right-sided pedicle screw at the L5 level courses medial to the right-sided pedicle.      2.  Grade 1 spondylolisthesis at the L5-S1 level.      3.  Large left paracentral disc extrusion at the L3-4 level markedly compressing the left ventral surface of thecal sac and the left L4 nerve root in its lateral recess.      4.  Small left paracentral disc extrusion at the L4-5 level effacing left ventral surface of thecal sac.      5.  Mild central canal stenosis at the L3-4 level secondary to facet arthropathy.      6.  Minimal to mild multilevel lumbar spondylotic change.      OUTSIDE IMAGES-CT LUMBAR SPINE   Final Result           Assessment/Plan  * Lumbar nerve root compression- (present on  admission)  Assessment & Plan  MRI lumbar spine shows malpositioned right L5 pedicle screw/solid L5/S1 fusion.  Status post Microscopic bilateral L3-L4 partial laminectomies, left medial facetectomy, microscopic excision of herniated disk, decompression of thecal sac and left L4 nerve root by Dr. Garcia 1/23/2020  Gabapentin 600 mg 3 times daily  Methocarbamol, Flexeril, Valium  oxycodone as needed, status post PCA pump, discontinued 1/24    Leukocytosis  Assessment & Plan  Probably steroid-induced.  We will continue monitor clinically    Chronic hepatitis C without hepatic coma (HCC)- (present on admission)  Assessment & Plan  Noted per history    Tobacco dependence- (present on admission)  Assessment & Plan  Cessation counseling provided      Schizoaffective disorder (HCC)- (present on admission)  Assessment & Plan  Appears to be stable.   Continue trihexyphenidyl  Scheduled Haldol  Monitor  Follow-up with mental health as outpatient    History of DVT (deep vein thrombosis)- (present on admission)  Assessment & Plan  History of right popliteal vein DVT in 2012  Not on anticoagulation as outpatient  Currently off heparin.  Restart DVT prophylaxis when okay per surgery    Back pain- (present on admission)  Assessment & Plan  status post bilateral L3-L4 partial laminectomies, left   medial facetectomy, microscopic excision of herniated disk, decompression of   thecal sac and left L4 nerve root on 1/24  Lower back pain is improving  On prednisone 60 mg daily; will taper down  PCA pump discontinued, transition to oral oxycodone as needed, continue methocarbamol, Flexeril  [facility] placement for acute rehab     Gastrointestinal prophylaxis: The patient has no risk factors for developing gastric ulcers or gastritis.  As the patient is tolerating oral intake, GI prophylaxis is not indicated at this time.  Antibiotics: Perioerpative Cefazolin  Diet: Cardiac  Code status: FULL  Prognosis: Guarded  Risk: The Patient is at HIGH  risk for complications and decompensation secondary to her multiple cormorbidities including acute on chronic back pain with multiple back surgeries requiring IV pain medication,   hepatitis C, tobacco abuse    I have personally reviewed notes, labs, vitals, imaging.  I discussed the plan of care with bedside RN as well as on multidisciplinary rounds    I have performed a physical exam and reviewed and updated ROS and Plan today (1/25/2020). In review of yesterday's note (1/24/2020), there are no changes except as documented above.     VTE prophylaxis: SCDs.

## 2020-01-25 NOTE — CARE PLAN
Problem: Safety  Goal: Will remain free from injury  Outcome: PROGRESSING AS EXPECTED  Note:   Safety precaution in effect. Non skid socks in use. Call light within reach. Reminded patient to call for assist. Hourly rounds in effect. Verbalized understanding.     Problem: Infection  Goal: Will remain free from infection  Outcome: PROGRESSING AS EXPECTED  Note:   Implement Standard precaution. Hand washing every encounter & before & after patient care. Verbalized understanding.     Problem: Knowledge Deficit  Goal: Knowledge of disease process/condition, treatment plan, diagnostic tests, and medications will improve  Outcome: PROGRESSING AS EXPECTED  Note:   Discussed Plan of care. Questions answered. Verbalized understanding.     Problem: Pain Management  Goal: Pain level will decrease to patient's comfort goal  Outcome: PROGRESSING AS EXPECTED  Note:   Educated on pain scale. Encouraged to verbalize pain. Will medicate as per MAR.

## 2020-01-25 NOTE — DISCHARGE PLANNING
0750- Referral sent to [facility].      1420- Spoke To: Meena  Agency/Facility Name: [facility]  Plan or Request: patient declined / no skilled need.    [facility] declined / drug addiction.    [facility] declined /  No medicaid at this time.
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
## 2020-01-25 NOTE — PROGRESS NOTES
Neurosurgery Progress Note    Subjective:  States LLE significantly improved.  Eating, min amb, voiding, pain controlled    Exam:  BLE grossly intact with coaching, some giveaway on left 4/5 DF/PF. CDI     BP  Min: 97/57  Max: 114/63  Pulse  Av.3  Min: 61  Max: 71  Resp  Av.4  Min: 16  Max: 18  Temp  Av.5 °C (97.7 °F)  Min: 36.2 °C (97.2 °F)  Max: 36.8 °C (98.3 °F)  SpO2  Av.5 %  Min: 93 %  Max: 96 %    No data recorded    Recent Labs     20  0332   WBC 11.0*   RBC 4.38   HEMOGLOBIN 13.1   HEMATOCRIT 39.6   MCV 90.4   MCH 29.9   MCHC 33.1*   RDW 39.5   PLATELETCT 267   MPV 9.5     Recent Labs     20  0332   SODIUM 140   POTASSIUM 4.0   CHLORIDE 106   CO2 26   GLUCOSE 82   BUN 16   CREATININE 0.82   CALCIUM 9.1     Recent Labs     20  0328   APTT 22.7*   INR 0.99     Recent Labs     20  0328   REACTMIN 2.2*   CLOTKINET 1.9   CLOTANGL 66.0   MAXCLOTS 61.1   XOL28RTB 0.0   PRCINADP 0.0   PRCINAA 23.6       Intake/Output       20 0700 - 20 0659 20 0700 - 20 0659      1076-1534 5132-0767 Total 8757-6068 4445-5957 Total       Intake    P.O.  1320  -- 1320  360  -- 360    P.O. 1320 -- 1320 360 -- 360    I.V.  434.8  -- 434.8  --  -- --    PCA End of Shift Total Volume (ml) 34.8 -- 34.8 -- -- --    Volume (mL) (0.9 % NaCl with KCl 20 mEq infusion) 400 -- 400 -- -- --    Total Intake 1754.8 -- 1754.8 360 -- 360       Output    Urine  --  -- --  --  -- --    Number of Times Voided 4 x -- 4 x 1 x -- 1 x    Drains  40  25 65  --  -- --    Output (mL) ([REMOVED] Closed/Suction Drain 1 Posterior Back Hemovac) 40  65 -- -- --    Stool  --  -- --  --  -- --    Number of Times Stooled 1 x -- 1 x -- -- --    Total Output 40  65 -- -- --       Net I/O     1714.8 -25 1689.8 360 -- 360            Intake/Output Summary (Last 24 hours) at 2020 1320  Last data filed at 2020 0930  Gross per 24 hour   Intake 840 ml   Output 25 ml   Net 815 ml            •  oxyCODONE-acetaminophen  1-2 Tab Q4HRS PRN   • predniSONE  30 mg DAILY   • haloperidol  5 mg TID   • Pharmacy Consult Request  1 Each PHARMACY TO DOSE   • MD ALERT...DO NOT ADMINISTER NSAIDS or ASPIRIN unless ORDERED By Neurosurgery  1 Each PRN   • docusate sodium  100 mg BID   • senna-docusate  1 Tab Nightly   • senna-docusate  1 Tab Q24HRS PRN   • polyethylene glycol/lytes  1 Packet BID PRN   • magnesium hydroxide  30 mL QDAY PRN   • bisacodyl  10 mg Q24HRS PRN   • fleet  1 Each Once PRN   • diphenhydrAMINE  25 mg Q6HRS PRN    Or   • diphenhydrAMINE  25 mg Q6HRS PRN   • ondansetron  4 mg Q4HRS PRN   • ondansetron  4 mg Q4HRS PRN   • promethazine  12.5-25 mg Q4HRS PRN   • promethazine  12.5-25 mg Q4HRS PRN   • prochlorperazine  5-10 mg Q4HRS PRN   • methocarbamol  750 mg Q8HRS PRN    Or   • cyclobenzaprine  10 mg Q8HRS PRN    Or   • diazePAM  5 mg Q4HRS PRN   • nicotine  21 mg Daily-0600   • gabapentin  600 mg TID   • trihexyphenidyl  4 mg QHS   • acetaminophen  650 mg Q6HRS PRN   • enalaprilat  1.25 mg Q6HRS PRN   • lidocaine  1 Patch Q24HR       Assessment and Plan:  POD #2 L3-4 Lami/disc  Prophylactic anticoagulation: no         Start date/time: tbd    PTOT  Continue pain control  DC planning- ok to snf when able    Follow up in four weeks, dc steristrips 10-14 days. No NSAIDs forone weeks.

## 2020-01-26 PROCEDURE — 99224 PR SUBSEQUENT OBSERVATION CARE,LEVEL I: CPT | Performed by: INTERNAL MEDICINE

## 2020-01-26 PROCEDURE — A9270 NON-COVERED ITEM OR SERVICE: HCPCS | Performed by: INTERNAL MEDICINE

## 2020-01-26 PROCEDURE — 700102 HCHG RX REV CODE 250 W/ 637 OVERRIDE(OP): Performed by: INTERNAL MEDICINE

## 2020-01-26 PROCEDURE — A9270 NON-COVERED ITEM OR SERVICE: HCPCS | Performed by: NURSE PRACTITIONER

## 2020-01-26 PROCEDURE — G0378 HOSPITAL OBSERVATION PER HR: HCPCS

## 2020-01-26 PROCEDURE — 700111 HCHG RX REV CODE 636 W/ 250 OVERRIDE (IP): Performed by: INTERNAL MEDICINE

## 2020-01-26 PROCEDURE — 700102 HCHG RX REV CODE 250 W/ 637 OVERRIDE(OP): Performed by: HOSPITALIST

## 2020-01-26 PROCEDURE — A9270 NON-COVERED ITEM OR SERVICE: HCPCS | Performed by: HOSPITALIST

## 2020-01-26 PROCEDURE — 700102 HCHG RX REV CODE 250 W/ 637 OVERRIDE(OP): Performed by: NURSE PRACTITIONER

## 2020-01-26 PROCEDURE — 700112 HCHG RX REV CODE 229: Performed by: NURSE PRACTITIONER

## 2020-01-26 RX ADMIN — METHOCARBAMOL TABLETS 750 MG: 750 TABLET, COATED ORAL at 21:28

## 2020-01-26 RX ADMIN — HALOPERIDOL 5 MG: 5 TABLET ORAL at 05:13

## 2020-01-26 RX ADMIN — HALOPERIDOL 5 MG: 5 TABLET ORAL at 18:15

## 2020-01-26 RX ADMIN — NICOTINE TRANSDERMAL SYSTEM 21 MG: 21 PATCH, EXTENDED RELEASE TRANSDERMAL at 05:13

## 2020-01-26 RX ADMIN — GABAPENTIN 600 MG: 300 CAPSULE ORAL at 13:55

## 2020-01-26 RX ADMIN — OXYCODONE AND ACETAMINOPHEN 2 TABLET: 5; 325 TABLET ORAL at 01:35

## 2020-01-26 RX ADMIN — PREDNISONE 30 MG: 20 TABLET ORAL at 05:13

## 2020-01-26 RX ADMIN — OXYCODONE AND ACETAMINOPHEN 2 TABLET: 5; 325 TABLET ORAL at 14:33

## 2020-01-26 RX ADMIN — HALOPERIDOL 5 MG: 5 TABLET ORAL at 10:37

## 2020-01-26 RX ADMIN — SENNOSIDES AND DOCUSATE SODIUM 1 TABLET: 8.6; 5 TABLET ORAL at 21:28

## 2020-01-26 RX ADMIN — CYCLOBENZAPRINE 10 MG: 10 TABLET, FILM COATED ORAL at 13:55

## 2020-01-26 RX ADMIN — GABAPENTIN 600 MG: 300 CAPSULE ORAL at 21:28

## 2020-01-26 RX ADMIN — DOCUSATE SODIUM 100 MG: 100 CAPSULE, LIQUID FILLED ORAL at 05:13

## 2020-01-26 RX ADMIN — TRIHEXYPHENIDYL HYDROCHLORIDE 4 MG: 2 TABLET ORAL at 21:28

## 2020-01-26 RX ADMIN — OXYCODONE AND ACETAMINOPHEN 2 TABLET: 5; 325 TABLET ORAL at 10:39

## 2020-01-26 RX ADMIN — DOCUSATE SODIUM 100 MG: 100 CAPSULE, LIQUID FILLED ORAL at 18:15

## 2020-01-26 RX ADMIN — OXYCODONE AND ACETAMINOPHEN 2 TABLET: 5; 325 TABLET ORAL at 05:52

## 2020-01-26 RX ADMIN — GABAPENTIN 600 MG: 300 CAPSULE ORAL at 05:13

## 2020-01-26 RX ADMIN — OXYCODONE AND ACETAMINOPHEN 2 TABLET: 5; 325 TABLET ORAL at 23:30

## 2020-01-26 RX ADMIN — OXYCODONE AND ACETAMINOPHEN 2 TABLET: 5; 325 TABLET ORAL at 18:15

## 2020-01-26 ASSESSMENT — ENCOUNTER SYMPTOMS
WEAKNESS: 0
DOUBLE VISION: 0
PHOTOPHOBIA: 0
NECK PAIN: 0
TREMORS: 0
CONSTIPATION: 0
ABDOMINAL PAIN: 0
NERVOUS/ANXIOUS: 0
BLURRED VISION: 0
WEIGHT LOSS: 0
FEVER: 0
DIZZINESS: 0
PALPITATIONS: 0
CHILLS: 0
TINGLING: 1
WHEEZING: 0
HEMOPTYSIS: 0
VOMITING: 0
SHORTNESS OF BREATH: 0
COUGH: 0
SPUTUM PRODUCTION: 0
SORE THROAT: 0
DEPRESSION: 0
DIARRHEA: 0
BLOOD IN STOOL: 0
NAUSEA: 0
SINUS PAIN: 0
SEIZURES: 0
HEADACHES: 0
FALLS: 0
BACK PAIN: 1
LOSS OF CONSCIOUSNESS: 0

## 2020-01-26 NOTE — PROGRESS NOTES
1850 Patient's sitting up in bed. No changes in status. Bedside report given to Marco DALTON RN (Roxy).

## 2020-01-26 NOTE — PROGRESS NOTES
Neurosurgery Progress Note    Subjective:  States LLE significantly improved.  Eating, min amb, voiding, pain controlled    Exam:  A&O  BLE grossly intact with coaching, some giveaway on left 4/5 DF/PF. Some numbness in right anterior thigh- unchanged  incision CDI     BP  Min: 105/67  Max: 125/64  Pulse  Av.3  Min: 61  Max: 83  Resp  Avg: 15  Min: 14  Max: 16  Temp  Av.7 °C (98 °F)  Min: 36.6 °C (97.8 °F)  Max: 36.8 °C (98.2 °F)  SpO2  Av %  Min: 92 %  Max: 96 %    No data recorded                      Intake/Output       20 - 20 0659 20 - 20 0659       1841-4306 Total 5640-8348 1770-6714 Total       Intake    P.O.  840  -- 840  150  -- 150    P.O. 840 -- 840 150 -- 150    Total Intake 840 -- 840 150 -- 150       Output    Urine  --  -- --  --  -- --    Number of Times Voided 6 x -- 6 x 2 x -- 2 x    Stool  --  -- --  --  -- --    Number of Times Stooled 0 x 1 x 1 x -- -- --    Total Output -- -- -- -- -- --       Net I/O     840 -- 840 150 -- 150            Intake/Output Summary (Last 24 hours) at 2020 1350  Last data filed at 2020 0715  Gross per 24 hour   Intake 150 ml   Output --   Net 150 ml            • oxyCODONE-acetaminophen  1-2 Tab Q4HRS PRN   • predniSONE  30 mg DAILY   • haloperidol  5 mg TID   • Pharmacy Consult Request  1 Each PHARMACY TO DOSE   • MD ALERT...DO NOT ADMINISTER NSAIDS or ASPIRIN unless ORDERED By Neurosurgery  1 Each PRN   • docusate sodium  100 mg BID   • senna-docusate  1 Tab Nightly   • senna-docusate  1 Tab Q24HRS PRN   • polyethylene glycol/lytes  1 Packet BID PRN   • magnesium hydroxide  30 mL QDAY PRN   • bisacodyl  10 mg Q24HRS PRN   • fleet  1 Each Once PRN   • diphenhydrAMINE  25 mg Q6HRS PRN    Or   • diphenhydrAMINE  25 mg Q6HRS PRN   • ondansetron  4 mg Q4HRS PRN   • ondansetron  4 mg Q4HRS PRN   • promethazine  12.5-25 mg Q4HRS PRN   • promethazine  12.5-25 mg Q4HRS PRN   • prochlorperazine  5-10 mg Q4HRS  PRN   • methocarbamol  750 mg Q8HRS PRN    Or   • cyclobenzaprine  10 mg Q8HRS PRN    Or   • diazePAM  5 mg Q4HRS PRN   • nicotine  21 mg Daily-0600   • gabapentin  600 mg TID   • trihexyphenidyl  4 mg QHS   • acetaminophen  650 mg Q6HRS PRN   • enalaprilat  1.25 mg Q6HRS PRN   • lidocaine  1 Patch Q24HR       Assessment and Plan:  POD #3 L3-4 Lami/disc  Prophylactic anticoagulation: no         Start date/time: tbd    PTOT  Continue pain control  DC planning- ok to snf when able    Follow up in four weeks, dc steristrips 10-14 days. No NSAIDs forone weeks.

## 2020-01-26 NOTE — PROGRESS NOTES
[facility] Medicine Daily Progress Note    Date of Service  1/26/2020    Chief Complaint  39 y.o. female admitted 1/20/2020 with acute on chronic back pain    Hospital Course    Ms. Cira Ferguson is a 39 y.o. female with multiple prior back surgeries who presented with acute on chronic back pain.  Had acute exacerbation of back pain in December after dancing all night at a [employer] concert.  Was seen at hospital in [place] and no surgery was recommended.  On admission to [facility] she reports her back pain radiates down bilateral lower extremities.  No red flags.  Neurosurgery was consulted Dr. Garica.  Status post bilateral L3-L4 partial laminectomies, left   medial facetectomy, microscopic excision of herniated disk, decompression of thecal sac and left L4 nerve root 1/23/2020.          Interval Problem Update  In no acute distress.  Vital stable.  No overnight event    Consultants/Specialty  Neurosurgery    Code Status  FULL    Disposition  SNF for rehab, pending acceptance    Review of Systems  Review of Systems   Constitutional: Negative for chills, fever and weight loss.   HENT: Negative for congestion, sinus pain and sore throat.    Eyes: Negative for blurred vision, double vision and photophobia.   Respiratory: Negative for cough, hemoptysis, sputum production, shortness of breath and wheezing.    Cardiovascular: Negative for chest pain, palpitations and leg swelling.   Gastrointestinal: Negative for abdominal pain, blood in stool, constipation, diarrhea, nausea and vomiting.   Genitourinary: Negative for dysuria, frequency, hematuria and urgency.   Musculoskeletal: Positive for back pain. Negative for falls, joint pain and neck pain.   Skin: Negative for rash.   Neurological: Positive for tingling. Negative for dizziness, tremors, seizures, loss of consciousness, weakness and headaches.   Psychiatric/Behavioral: Negative for depression and suicidal ideas. The patient is not nervous/anxious.    All other  systems reviewed and are negative.       Physical Exam  Temp:  [36.6 °C (97.8 °F)-36.8 °C (98.2 °F)] 36.6 °C (97.8 °F)  Pulse:  [61-83] 62  Resp:  [14-16] 14  BP: (105-125)/(61-67) 105/67  SpO2:  [92 %-96 %] 94 %    Physical Exam  Vitals signs and nursing note reviewed.   Constitutional:       General: She is not in acute distress.     Appearance: Normal appearance.   HENT:      Head: Normocephalic and atraumatic.      Nose: Nose normal.      Mouth/Throat:      Mouth: Mucous membranes are moist.      Pharynx: Oropharynx is clear. No oropharyngeal exudate or posterior oropharyngeal erythema.   Eyes:      Extraocular Movements: Extraocular movements intact.      Conjunctiva/sclera: Conjunctivae normal.   Neck:      Musculoskeletal: Normal range of motion and neck supple.   Cardiovascular:      Rate and Rhythm: Normal rate and regular rhythm.      Pulses: Normal pulses.      Heart sounds: Normal heart sounds. No murmur.   Pulmonary:      Effort: Pulmonary effort is normal. No respiratory distress.      Breath sounds: Normal breath sounds. No stridor. No wheezing or rales.   Abdominal:      General: Abdomen is flat. Bowel sounds are normal. There is no distension.      Palpations: Abdomen is soft. There is no mass.      Tenderness: There is no tenderness.   Musculoskeletal:      Comments: Tenderness to palpation paraspinal area at L3-L4.  Surgical site CDI   Skin:     General: Skin is warm.      Capillary Refill: Capillary refill takes less than 2 seconds.   Neurological:      General: No focal deficit present.      Mental Status: She is alert and oriented to person, place, and time. Mental status is at baseline.      Cranial Nerves: No cranial nerve deficit.      Motor: No weakness.   Psychiatric:         Mood and Affect: Mood normal.         Behavior: Behavior normal.       I examined the patient 1/27.  No significant changes from exam from 1/26 noted except as documented above  Fluids    Intake/Output Summary (Last  24 hours) at 1/26/2020 1201  Last data filed at 1/26/2020 0715  Gross per 24 hour   Intake 630 ml   Output --   Net 630 ml       Laboratory                        Imaging  DX-PORTABLE FLUORO > 1 HOUR   Final Result      Portable fluoroscopy utilized for 1 second.         INTERPRETING LOCATION: [address] [place] [address], [place], [address]      DX-SPINE-ANY ONE VIEW   Final Result      Fluoroscopic image(s) obtained during lumbar spinal instrumentation. Please see the patient's chart for full procedural details.      Fluoroscopy time 1 second.         DX-CHEST-2 VIEWS   Final Result      No evidence of acute cardiopulmonary disease      MR-LUMBAR SPINE-W/O   Final Result      1.  Previous laminectomy and bilateral pedicle screw and fatoumata fixation at the L5-S1 level. The right-sided pedicle screw at the L5 level courses medial to the right-sided pedicle.      2.  Grade 1 spondylolisthesis at the L5-S1 level.      3.  Large left paracentral disc extrusion at the L3-4 level markedly compressing the left ventral surface of thecal sac and the left L4 nerve root in its lateral recess.      4.  Small left paracentral disc extrusion at the L4-5 level effacing left ventral surface of thecal sac.      5.  Mild central canal stenosis at the L3-4 level secondary to facet arthropathy.      6.  Minimal to mild multilevel lumbar spondylotic change.      OUTSIDE IMAGES-CT LUMBAR SPINE   Final Result           Assessment/Plan  * Lumbar nerve root compression- (present on admission)  Assessment & Plan  MRI lumbar spine shows malpositioned right L5 pedicle screw/solid L5/S1 fusion.  Status post Microscopic bilateral L3-L4 partial laminectomies, left medial facetectomy, microscopic excision of herniated disk, decompression of thecal sac and left L4 nerve root by Dr. Garcia 1/23/2020  Gabapentin 600 mg 3 times daily  Methocarbamol, Flexeril, Valium  oxycodone as needed, status post PCA pump, discontinued 1/24    Leukocytosis  Assessment & Plan  Probably  steroid-induced.  We will continue monitor clinically    Chronic hepatitis C without hepatic coma (HCC)- (present on admission)  Assessment & Plan  Noted per history    Tobacco dependence- (present on admission)  Assessment & Plan  Cessation counseling provided      Schizoaffective disorder (HCC)- (present on admission)  Assessment & Plan  Appears to be stable.   Continue trihexyphenidyl  Scheduled Haldol  Monitor  Follow-up with mental health as outpatient    History of DVT (deep vein thrombosis)- (present on admission)  Assessment & Plan  History of right popliteal vein DVT in 2012  Not on anticoagulation as outpatient  Currently off heparin.  Restart DVT prophylaxis when okay per surgery    Back pain- (present on admission)  Assessment & Plan  status post bilateral L3-L4 partial laminectomies, left   medial facetectomy, microscopic excision of herniated disk, decompression of   thecal sac and left L4 nerve root on 1/24  Lower back pain is improving  On prednisone 60 mg daily; will taper down  PCA pump discontinued, transition to oral oxycodone as needed, continue methocarbamol, Flexeril  [facility] placement for acute rehab    I have performed a physical exam and reviewed and updated ROS and Plan today (1/26/2020). In review of yesterday's note (1/25/2020), there are no changes except as documented above.     VTE prophylaxis: SCDs.

## 2020-01-26 NOTE — PROGRESS NOTES
[facility] Medicine Daily Progress Note    Date of Service  1/27/2020    Chief Complaint  39 y.o. female admitted 1/20/2020 with acute on chronic back pain    Hospital Course    Ms. Cira Ferguson is a 39 y.o. female with multiple prior back surgeries who presented with acute on chronic back pain.  Had acute exacerbation of back pain in December after dancing all night at a [employer] concert.  Was seen at hospital in [place] and no surgery was recommended.  On admission to [facility] she reports her back pain radiates down bilateral lower extremities.  No red flags.  Neurosurgery was consulted Dr. Garcia.  Status post bilateral L3-L4 partial laminectomies, left   medial facetectomy, microscopic excision of herniated disk, decompression of thecal sac and left L4 nerve root 1/23/2020.          Interval Problem Update  Patient in no distress.  Stated that she had some numbness in the left inner thigh last night which resolved.  Otherwise no change in lower extremity strength or bowel and bladder function.    She does have some pressure like sensation in the surgical area, which is stable.  There is no discharge, erythema or swelling.  Consultants/Specialty  Neurosurgery    Code Status  FULL    Disposition  SNF for rehab, pending acceptance    Review of Systems  Review of Systems   Constitutional: Negative for chills, fever and weight loss.   HENT: Negative for congestion, sinus pain and sore throat.    Eyes: Negative for blurred vision, double vision and photophobia.   Respiratory: Negative for cough, hemoptysis, sputum production, shortness of breath and wheezing.    Cardiovascular: Negative for chest pain, palpitations and leg swelling.   Gastrointestinal: Negative for abdominal pain, blood in stool, constipation, diarrhea, nausea and vomiting.   Genitourinary: Negative for dysuria, frequency, hematuria and urgency.   Musculoskeletal: Positive for back pain. Negative for falls, joint pain and neck pain.   Skin: Negative  for rash.   Neurological: Positive for tingling. Negative for dizziness, tremors, seizures, loss of consciousness, weakness and headaches.   Psychiatric/Behavioral: Negative for depression and suicidal ideas. The patient is not nervous/anxious.    All other systems reviewed and are negative.       Physical Exam  Temp:  [36.6 °C (97.8 °F)-36.8 °C (98.2 °F)] 36.6 °C (97.8 °F)  Pulse:  [61-83] 62  Resp:  [14-16] 14  BP: (105-125)/(61-67) 105/67  SpO2:  [92 %-96 %] 94 %    Physical Exam  Vitals signs and nursing note reviewed.   Constitutional:       General: She is not in acute distress.     Appearance: Normal appearance.   HENT:      Head: Normocephalic and atraumatic.      Nose: Nose normal.      Mouth/Throat:      Mouth: Mucous membranes are moist.      Pharynx: Oropharynx is clear. No oropharyngeal exudate or posterior oropharyngeal erythema.   Eyes:      Extraocular Movements: Extraocular movements intact.      Conjunctiva/sclera: Conjunctivae normal.   Neck:      Musculoskeletal: Normal range of motion and neck supple.   Cardiovascular:      Rate and Rhythm: Normal rate and regular rhythm.      Pulses: Normal pulses.      Heart sounds: Normal heart sounds. No murmur.   Pulmonary:      Effort: Pulmonary effort is normal. No respiratory distress.      Breath sounds: Normal breath sounds. No stridor. No wheezing or rales.   Abdominal:      General: Abdomen is flat. Bowel sounds are normal. There is no distension.      Palpations: Abdomen is soft. There is no mass.      Tenderness: There is no tenderness.   Musculoskeletal:      Comments: Tenderness to palpation paraspinal area at L3-L4.  Surgical site CDI   Skin:     General: Skin is warm.      Capillary Refill: Capillary refill takes less than 2 seconds.   Neurological:      General: No focal deficit present.      Mental Status: She is alert and oriented to person, place, and time. Mental status is at baseline.      Cranial Nerves: No cranial nerve deficit.       Motor: No weakness.   Psychiatric:         Mood and Affect: Mood normal.         Behavior: Behavior normal.       I examined the patient 1/26.  No significant changes from exam from 1/25 noted except as documented above  Fluids    Intake/Output Summary (Last 24 hours) at 1/26/2020 1249  Last data filed at 1/26/2020 0715  Gross per 24 hour   Intake 630 ml   Output --   Net 630 ml       Laboratory                        Imaging  DX-PORTABLE FLUORO > 1 HOUR   Final Result      Portable fluoroscopy utilized for 1 second.         INTERPRETING LOCATION: [address] [place] [address], [place], [address]      DX-SPINE-ANY ONE VIEW   Final Result      Fluoroscopic image(s) obtained during lumbar spinal instrumentation. Please see the patient's chart for full procedural details.      Fluoroscopy time 1 second.         DX-CHEST-2 VIEWS   Final Result      No evidence of acute cardiopulmonary disease      MR-LUMBAR SPINE-W/O   Final Result      1.  Previous laminectomy and bilateral pedicle screw and fatoumata fixation at the L5-S1 level. The right-sided pedicle screw at the L5 level courses medial to the right-sided pedicle.      2.  Grade 1 spondylolisthesis at the L5-S1 level.      3.  Large left paracentral disc extrusion at the L3-4 level markedly compressing the left ventral surface of thecal sac and the left L4 nerve root in its lateral recess.      4.  Small left paracentral disc extrusion at the L4-5 level effacing left ventral surface of thecal sac.      5.  Mild central canal stenosis at the L3-4 level secondary to facet arthropathy.      6.  Minimal to mild multilevel lumbar spondylotic change.      OUTSIDE IMAGES-CT LUMBAR SPINE   Final Result           Assessment/Plan  * Lumbar nerve root compression- (present on admission)  Assessment & Plan  MRI lumbar spine shows malpositioned right L5 pedicle screw/solid L5/S1 fusion.  Status post Microscopic bilateral L3-L4 partial laminectomies, left medial facetectomy, microscopic excision of  herniated disk, decompression of thecal sac and left L4 nerve root by Dr. Garcia 1/23/2020  Gabapentin 600 mg 3 times daily  Methocarbamol, Flexeril, Valium  oxycodone as needed, status post PCA pump, discontinued 1/24    Leukocytosis  Assessment & Plan  Probably steroid-induced.  We will continue monitor clinically    Chronic hepatitis C without hepatic coma (HCC)- (present on admission)  Assessment & Plan  Noted per history    Tobacco dependence- (present on admission)  Assessment & Plan  Cessation counseling provided      Schizoaffective disorder (HCC)- (present on admission)  Assessment & Plan  Appears to be stable.   Continue trihexyphenidyl  Scheduled Haldol  Monitor  Follow-up with mental health as outpatient    History of DVT (deep vein thrombosis)- (present on admission)  Assessment & Plan  History of right popliteal vein DVT in 2012  Not on anticoagulation as outpatient  Currently off heparin.  Restart DVT prophylaxis when okay per surgery    Back pain- (present on admission)  Assessment & Plan  status post bilateral L3-L4 partial laminectomies, left   medial facetectomy, microscopic excision of herniated disk, decompression of   thecal sac and left L4 nerve root on 1/24  Lower back pain is improving  On prednisone 60 mg daily; will taper down  PCA pump discontinued, transition to oral oxycodone as needed, continue methocarbamol, Flexeril  [facility]i placement for acute rehab     Gastrointestinal prophylaxis: The patient has no risk factors for developing gastric ulcers or gastritis.  As the patient is tolerating oral intake, GI prophylaxis is not indicated at this time.  Antibiotics: Perioerpative Cefazolin  Diet: Cardiac  Code status: FULL  Prognosis: Guarded  Risk: The Patient is at HIGH risk for complications and decompensation secondary to her multiple cormorbidities including acute on chronic back pain with multiple back surgeries requiring IV pain medication,   hepatitis C, tobacco abuse    I have  personally reviewed notes, labs, vitals, imaging.  I discussed the plan of care with bedside RN as well as on multidisciplinary rounds    I have performed a physical exam and reviewed and updated ROS and Plan today (1/26/2020). In review of yesterday's note (1/25/2020), there are no changes except as documented above.     VTE prophylaxis: SCDs.

## 2020-01-27 VITALS
OXYGEN SATURATION: 92 % | WEIGHT: 293 LBS | HEIGHT: 72 IN | HEART RATE: 61 BPM | DIASTOLIC BLOOD PRESSURE: 60 MMHG | BODY MASS INDEX: 39.68 KG/M2 | RESPIRATION RATE: 16 BRPM | TEMPERATURE: 98 F | SYSTOLIC BLOOD PRESSURE: 119 MMHG

## 2020-01-27 PROCEDURE — A9270 NON-COVERED ITEM OR SERVICE: HCPCS | Performed by: NURSE PRACTITIONER

## 2020-01-27 PROCEDURE — A9270 NON-COVERED ITEM OR SERVICE: HCPCS | Performed by: HOSPITALIST

## 2020-01-27 PROCEDURE — 700111 HCHG RX REV CODE 636 W/ 250 OVERRIDE (IP): Performed by: INTERNAL MEDICINE

## 2020-01-27 PROCEDURE — G0378 HOSPITAL OBSERVATION PER HR: HCPCS

## 2020-01-27 PROCEDURE — 99217 PR OBSERVATION CARE DISCHARGE: CPT | Performed by: INTERNAL MEDICINE

## 2020-01-27 PROCEDURE — 700102 HCHG RX REV CODE 250 W/ 637 OVERRIDE(OP): Performed by: HOSPITALIST

## 2020-01-27 PROCEDURE — A9270 NON-COVERED ITEM OR SERVICE: HCPCS | Performed by: INTERNAL MEDICINE

## 2020-01-27 PROCEDURE — 700102 HCHG RX REV CODE 250 W/ 637 OVERRIDE(OP): Performed by: NURSE PRACTITIONER

## 2020-01-27 PROCEDURE — 700112 HCHG RX REV CODE 229: Performed by: NURSE PRACTITIONER

## 2020-01-27 PROCEDURE — 700102 HCHG RX REV CODE 250 W/ 637 OVERRIDE(OP): Performed by: INTERNAL MEDICINE

## 2020-01-27 RX ORDER — OXYCODONE HYDROCHLORIDE AND ACETAMINOPHEN 5; 325 MG/1; MG/1
1-2 TABLET ORAL EVERY 4 HOURS PRN
Qty: 15 TAB | Refills: 0 | Status: SHIPPED | OUTPATIENT
Start: 2020-01-27 | End: 2020-01-27

## 2020-01-27 RX ORDER — METHOCARBAMOL 750 MG/1
750 TABLET, FILM COATED ORAL EVERY 8 HOURS PRN
Qty: 120 TAB | Refills: 0 | Status: SHIPPED | OUTPATIENT
Start: 2020-01-27 | End: 2020-03-17

## 2020-01-27 RX ORDER — OXYCODONE HYDROCHLORIDE AND ACETAMINOPHEN 5; 325 MG/1; MG/1
1-2 TABLET ORAL EVERY 4 HOURS PRN
Qty: 15 TAB | Refills: 0 | Status: SHIPPED | OUTPATIENT
Start: 2020-01-27 | End: 2020-01-30

## 2020-01-27 RX ORDER — PREDNISONE 20 MG/1
20 TABLET ORAL DAILY
Status: DISCONTINUED | OUTPATIENT
Start: 2020-01-28 | End: 2020-01-27 | Stop reason: HOSPADM

## 2020-01-27 RX ORDER — PREDNISONE 20 MG/1
TABLET ORAL
Qty: 5 TAB | Refills: 0 | Status: SHIPPED | OUTPATIENT
Start: 2020-01-27 | End: 2020-02-02

## 2020-01-27 RX ADMIN — DOCUSATE SODIUM 100 MG: 100 CAPSULE, LIQUID FILLED ORAL at 04:32

## 2020-01-27 RX ADMIN — PREDNISONE 30 MG: 20 TABLET ORAL at 04:33

## 2020-01-27 RX ADMIN — NICOTINE TRANSDERMAL SYSTEM 21 MG: 21 PATCH, EXTENDED RELEASE TRANSDERMAL at 04:34

## 2020-01-27 RX ADMIN — OXYCODONE AND ACETAMINOPHEN 2 TABLET: 5; 325 TABLET ORAL at 10:37

## 2020-01-27 RX ADMIN — GABAPENTIN 600 MG: 300 CAPSULE ORAL at 13:34

## 2020-01-27 RX ADMIN — HALOPERIDOL 5 MG: 5 TABLET ORAL at 04:31

## 2020-01-27 RX ADMIN — HALOPERIDOL 5 MG: 5 TABLET ORAL at 13:34

## 2020-01-27 RX ADMIN — METHOCARBAMOL TABLETS 750 MG: 750 TABLET, COATED ORAL at 10:37

## 2020-01-27 RX ADMIN — OXYCODONE AND ACETAMINOPHEN 2 TABLET: 5; 325 TABLET ORAL at 04:32

## 2020-01-27 RX ADMIN — GABAPENTIN 600 MG: 300 CAPSULE ORAL at 04:32

## 2020-01-27 RX ADMIN — OXYCODONE AND ACETAMINOPHEN 2 TABLET: 5; 325 TABLET ORAL at 15:18

## 2020-01-27 NOTE — PROGRESS NOTES
Neurosurgery Progress Note    Subjective:  States LLE significantly improved.  Eating, min amb, voiding, pain controlled +bm    Exam:  A&O  BLE grossly intact with coaching, some giveaway on left 4/5 DF/PF. Some numbness in right anterior thigh- unchanged  incision CDI     BP  Min: 98/59  Max: 119/60  Pulse  Av  Min: 61  Max: 77  Resp  Av  Min: 16  Max: 16  Temp  Av.6 °C (97.8 °F)  Min: 36.2 °C (97.1 °F)  Max: 36.8 °C (98.2 °F)  SpO2  Av.3 %  Min: 90 %  Max: 97 %    No data recorded                      Intake/Output       20 - 2059 20 - 2059       Total  Total       Intake    P.O.  150  -- 150  --  -- --    P.O. 150 -- 150 -- -- --    Total Intake 150 -- 150 -- -- --       Output    Urine  --  -- --  --  -- --    Number of Times Voided 5 x -- 5 x -- -- --    Stool  --  -- --  --  -- --    Number of Times Stooled 1 x 0 x 1 x -- -- --    Total Output -- -- -- -- -- --       Net I/O     150 -- 150 -- -- --          No intake or output data in the 24 hours ending 20 1100         • [START ON 2020] predniSONE  20 mg DAILY   • oxyCODONE-acetaminophen  1-2 Tab Q4HRS PRN   • haloperidol  5 mg TID   • Pharmacy Consult Request  1 Each PHARMACY TO DOSE   • MD ALERT...DO NOT ADMINISTER NSAIDS or ASPIRIN unless ORDERED By Neurosurgery  1 Each PRN   • docusate sodium  100 mg BID   • senna-docusate  1 Tab Nightly   • senna-docusate  1 Tab Q24HRS PRN   • polyethylene glycol/lytes  1 Packet BID PRN   • magnesium hydroxide  30 mL QDAY PRN   • bisacodyl  10 mg Q24HRS PRN   • fleet  1 Each Once PRN   • diphenhydrAMINE  25 mg Q6HRS PRN    Or   • diphenhydrAMINE  25 mg Q6HRS PRN   • ondansetron  4 mg Q4HRS PRN   • ondansetron  4 mg Q4HRS PRN   • promethazine  12.5-25 mg Q4HRS PRN   • promethazine  12.5-25 mg Q4HRS PRN   • prochlorperazine  5-10 mg Q4HRS PRN   • methocarbamol  750 mg Q8HRS PRN    Or   • cyclobenzaprine  10 mg Q8HRS  PRN    Or   • diazePAM  5 mg Q4HRS PRN   • nicotine  21 mg Daily-0600   • gabapentin  600 mg TID   • trihexyphenidyl  4 mg QHS   • acetaminophen  650 mg Q6HRS PRN   • enalaprilat  1.25 mg Q6HRS PRN   • lidocaine  1 Patch Q24HR       Assessment and Plan:  POD #4 L3-4 Lami/disc  Prophylactic anticoagulation: no         Start date/time: tbd    PTOT  Continue pain control  DC planning- ok to snf when able    Follow up in four weeks, dc steristrips 10-14 days. No NSAIDs forone weeks.

## 2020-01-27 NOTE — DISCHARGE PLANNING
Spoke To: Isabel  Agency/Facility Name: [facility]   Plan or Request: patient declined / no medicaid beds.

## 2020-01-27 NOTE — DISCHARGE PLANNING
Anticipated Discharge Disposition:   Shelter  DME FWW    Action:    Explained to patient that she was declined by all SNFs.  Unable to have home health if homeless.  No needs per OT/PT.    Choice for [facility].  Pt signed form and it was faxed to [facility]. FWW delivered to patient's room.    Taxi voucher provided to patient.    She stated her mother will  her medications.     Barriers to Discharge:    None    Plan:    DC

## 2020-01-27 NOTE — DISCHARGE INSTRUCTIONS
Discharge Instructions    Discharged to other by taxi with self. Discharged via wheelchair, hospital escort: Yes.  Special equipment needed: Walker    Be sure to schedule a follow-up appointment with your primary care doctor or any specialists as instructed.     Discharge Plan:   Diet Plan: Discussed  Activity Level: Discussed  Smoking Cessation Offered: Patient Counseled  Confirmed Follow up Appointment: Patient to Call and Schedule Appointment  Confirmed Symptoms Management: Discussed  Medication Reconciliation Updated: Yes  Influenza Vaccine Indication: Not indicated: Previously immunized this influenza season and > 8 years of age  Influenza Vaccine Given - only chart on this line when given: Influenza Vaccine Given (See MAR)    I understand that a diet low in cholesterol, fat, and sodium is recommended for good health. Unless I have been given specific instructions below for another diet, I accept this instruction as my diet prescription.   Other diet: regular    Special Instructions:    • You may shower the day after your drain was removed. Leave incision open to air and pat dry.   • Don't apply creams to your incision  • No baths or hot tubs for 6 weeks post surgery or until MD   • No bending, lifting greater than 10lbs or twisting  • No NSAIDs (non-steroidal anti-inflammatories) for 7 days after surgery (Advil, Celebrex, Naproxen, Ibuprofen).   • Don't drive for 2 weeks or while taking narcotics  • Take stool softeners/laxatives while taking narcotics  • Schedule follow up appointment with [facility] neurosurgery 4 weeks after surgery  • Inspect the incision and call the office if there is redness, swelling, increased pain, drainage from the incision.     Contact a health care provider if:  · Your pain gets worse.  · You have a fever.  · You have redness, swelling, or pain at the site of your incision.  · You have fluid, blood, or pus coming from your incision.  · You have numbness, tingling, or weakness in any  part of your body.  Get help right away if:  · Your incision feels swollen and tender, and the surrounding area looks like a lump. The lump may be red or bluish in color.  · You cannot move any part of your body (paralysis).  · You cannot control your bladder or bowels.      · Is patient discharged on Warfarin / Coumadin?   No       Laminectomy  Laminectomy is a surgery to remove:  · Small pieces of bone in the spine (lamina).  · Tough, cord-like tissues that connect bones to other bones (ligaments) underneath the lamina. These ligaments connect the bones in the spine (vertebrae).  · Parts of joints in the spine (facet joints) that have grown too large.  The goals of this surgery are:  · To reduce pressure on nerves and the spinal cord.  · To reduce pain, numbness, and discomfort.  You may need this procedure if you have narrowing of the spinal canal (spinal stenosis) or if you have a spinal tumor, spinal injury, or Paget disease of bone.  Tell a health care provider about:  · Any allergies you have.  · All medicines you are taking, including vitamins, herbs, eye drops, creams, and over-the-counter medicines.  · Any problems you or family members have had with anesthetic medicines.  · Any blood disorders you have.  · Any surgeries you have had.  · Any medical conditions you have, including a cold or any infections.  · Whether you are pregnant or may be pregnant.  What are the risks?  Generally, this is a safe procedure. However, problems may occur, including:  · Infection.  · Bleeding.  · Allergic reactions to medicines or dyes.  · Damage to other structures or organs, such as nerves. Nerve damage can cause pain, weakness, or numbness.  · Leaking of spinal fluid.  · A blood clot in a leg. The clot can move to the lungs, which can be very serious.  · Inability to control when you urinate (urinary incontinence) or when you have bowel movements (fecal incontinence). This is rare.  What happens before the  procedure?  Staying hydrated   Follow instructions from your health care provider about hydration, which may include:  · Up to 2 hours before the procedure - you may continue to drink clear liquids, such as water, clear fruit juice, black coffee, and plain tea.  Eating and drinking restrictions   Follow instructions from your health care provider about eating and drinking, which may include:  · 24 hours before the procedure - stop drinking alcohol.  · 8 hours before the procedure - stop eating heavy meals or foods such as meat, fried foods, or fatty foods.  · 6 hours before the procedure - stop eating light meals or foods, such as toast or cereal.  · 6 hours before the procedure - stop drinking milk or drinks that contain milk.  · 2 hours before the procedure - stop drinking clear liquids.  Medicines  · Ask your health care provider about:  ¨ Changing or stopping your regular medicines. This is especially important if you are taking diabetes medicines or blood thinners. If your health care provider asks you to keep taking some medicines, take them with a sip of water.  ¨ Taking medicines such as aspirin and ibuprofen. These medicines can thin your blood. Do not take these medicines before your procedure if your health care provider instructs you not to.  · You may be given antibiotic medicine to help prevent infection.  General instructions  · Do not use any products that contain nicotine or tobacco, such as cigarettes and e-cigarettes, for at least 2 weeks before the procedure. If you need help quitting, ask your health care provider.  · Ask your health care provider how your surgical site will be marked or identified.  · You may have tests done, such as blood tests or an electrocardiogram (ECG).  · If you will be going home right after the procedure, plan to have someone with you for 24 hours.  · Plan to have someone:  ¨ Take you home from the hospital or clinic.  ¨ Help you at home for the first week after the  procedure.  What happens during the procedure?  · To reduce your risk of infection, your health care team will wash or sanitize their hands.  · Small monitors will be placed on your body to check your heart rate, blood pressure, and oxygen level.  · An IV tube will be inserted into one of your veins.  · You will be given a medicine to make you fall asleep (general anesthetic). You may also be given a medicine to help you relax (sedative).  · A breathing tube will be placed into your lungs.  · Your back will be cleaned with a germ-killing solution.  · An incision will be made in your back. The incision may be 2-5 inches (5-13 cm) long, depending on how many vertebrae are being operated on.  · Muscles in your back will be moved away from the vertebrae and pulled to the side.  · Pieces of lamina will be removed.  · Ligaments and thickened facet joints will be removed. How much tissue and bone is removed depends on how much of the tissue is putting pressure on your nerves.  · Your nerves will be identified and evaluated to check for excessive tightness.  · Your back muscles will be moved back into their normal position.  · The area under your skin will be closed with small, dissolvable stitches (sutures).  · Your skin will be closed with small, absorbable sutures or staples.  · A bandage (dressing) will be placed over your incision.  The procedure may vary among health care providers and hospitals.  What happens after the procedure?  · Your blood pressure, heart rate, breathing rate, and blood oxygen level will be monitored until the medicines you were given have worn off.  · You may continue receive medicines and fluids through an IV tube.  · You will have some back pain. You will be given pain medicine as needed.  · It is important to be up and moving as soon as possible after this procedure. Physical therapists will help you start walking.  · To prevent blood clots in your legs:  ¨ You may have to wear compression  stockings. These stockings also reduce swelling in your legs.  ¨ You may need to take medicines.  · You may need to do breathing exercises to help prevent lung infection.  · Do not drive for 24 hours if you received a sedative.  Summary  · Laminectomy is a procedure that is done to reduce pressure on nerves and the spinal cord and to reduce pain, numbness, and discomfort.  · If you will be going home right after the procedure, plan to have someone with you for 24 hours.  · You will have some back pain. You will be given pain medicine as needed.  · It is important to be up and moving as soon as possible after this procedure. Physical therapists will help you start walking.  This information is not intended to replace advice given to you by your health care provider. Make sure you discuss any questions you have with your health care provider.  Document Released: 12/06/2010 Document Revised: 08/03/2017 Document Reviewed: 06/04/2017  [employer] Patient Education © 2017 [employer]        Depression / Suicide Risk    As you are discharged from this [facility] facility, it is important to learn how to keep safe from harming yourself.    Recognize the warning signs:  · Abrupt changes in personality, positive or negative- including increase in energy   · Giving away possessions  · Change in eating patterns- significant weight changes-  positive or negative  · Change in sleeping patterns- unable to sleep or sleeping all the time   · Unwillingness or inability to communicate  · Depression  · Unusual sadness, discouragement and loneliness  · Talk of wanting to die  · Neglect of personal appearance   · Rebelliousness- reckless behavior  · Withdrawal from people/activities they love  · Confusion- inability to concentrate     If you or a loved one observes any of these behaviors or has concerns about self-harm, here's what you can do:  · Talk about it- your feelings and reasons for harming yourself  · Remove any  means that you might use to hurt yourself (examples: pills, rope, extension cords, firearm)  · Get professional help from the community (Mental Health, Substance Abuse, psychological counseling)  · Do not be alone:Call your Safe Contact- someone whom you trust who will be there for you.  · Call your local CRISIS HOTLINE 732-0761 or 230-538-1471  · Call your local Children's Mobile Crisis Response Team [place] (748) 546-7795 or www.[employer]  · Call the toll free National Suicide Prevention Hotlines   · National Suicide Prevention Lifeline 078-241-NNMB (3555)  · National Hope Line Network 800-SUICIDE (404-2617)

## 2020-01-27 NOTE — PROGRESS NOTES
Pt being dc'd to the shelter with walker. No brace. The following prescriptions given percocet. IV dc'd. Flu vaccine up to date. Dc instructions discussed. All questions answered. Pt dc'd with all belongings. Patient agreeable to dc plan. Taxi voucher given to patient. Pt's home meds returned to patient.

## 2020-01-27 NOTE — DISCHARGE PLANNING
Received Choice form at [phone]  Agency/Facility Name: [facility]  Referral sent per Choice form @ [phone]

## 2020-01-27 NOTE — DISCHARGE SUMMARY
Discharge Summary    CHIEF COMPLAINT ON ADMISSION  Chief Complaint   Patient presents with   • Leg Pain       Reason for Admission  EMS     Admission Date  1/20/2020    CODE STATUS  Full Code    HPI & HOSPITAL COURSE     Ms. Cira Ferguson is a 39 y.o. female with multiple prior back surgeries who presented with acute on chronic back pain.  Had acute exacerbation of back pain in December after dancing all night at a [employer] concert.  Was seen at hospital in [place] and no surgery was recommended.  On admission to [facility] she reports her back pain radiates down bilateral lower extremities.  No red flags.  Neurosurgery was consulted Dr. Garcia. Patient undergone spinal surgery:  bilateral L3-L4 partial laminectomies, left  medial facetectomy, microscopic excision of herniated disk, decompression of thecal sac and left L4 nerve root 1/23/2020.     PT OT evaluated and recommended o/p PT OT , as well as FWW and shower chair.  PLacement to SNF attempted , but no SNFs accepted the patient.     Therefore, she is discharged in good and stable condition to home with close outpatient follow-up.        Discharge Date  1/27/2020    FOLLOW UP ITEMS POST DISCHARGE  Neurosurgery  (Follow up in Neurosurgery clinic in four weeks, dc steristrips 10-14 days. No NSAIDs forone weeks)    DISCHARGE DIAGNOSES  Principal Problem:    Lumbar nerve root compression POA: Yes  Active Problems:    Back pain POA: Yes    History of DVT (deep vein thrombosis) POA: Yes    Schizoaffective disorder (HCC) POA: Yes    Tobacco dependence POA: Yes    Chronic hepatitis C without hepatic coma (HCC) POA: Yes    Leukocytosis POA: Unknown  Resolved Problems:    * No resolved hospital problems. *      FOLLOW UP  No future appointments.  No follow-up provider specified.    MEDICATIONS ON DISCHARGE     Medication List      START taking these medications      Instructions   methocarbamol 750 MG Tabs  Commonly known as:  ROBAXIN   Take 1 Tab by mouth every 8 hours  as needed.  Dose:  750 mg     oxyCODONE-acetaminophen 5-325 MG Tabs  Commonly known as:  PERCOCET   Take 1-2 Tabs by mouth every four hours as needed for up to 3 days.  Dose:  1-2 Tab     predniSONE 20 MG Tabs  Start taking on:  January 27, 2020  Commonly known as:  DELTASONE   Take 1 Tab by mouth every day for 3 days, THEN 0.5 Tabs every day for 3 days.        CONTINUE taking these medications      Instructions   gabapentin 600 MG tablet  Commonly known as:  NEURONTIN   Take 600 mg by mouth 3 times a day.  Dose:  600 mg     haloperidol 5 MG Tabs  Commonly known as:  HALDOL   Take 5 mg by mouth 3 times a day.  Dose:  5 mg     trihexyphenidyl 2 MG Tabs  Commonly known as:  ARTANE   Take 4 mg by mouth every bedtime.  Dose:  4 mg            Allergies  Allergies   Allergen Reactions   • Contrast Media With Iodine [Iodine]    • Hydromorphone Hives and Itching   • Sulfa Drugs      Unknown rxn       DIET  Orders Placed This Encounter   Procedures   • Diet Order Regular     Standing Status:   Standing     Number of Occurrences:   1     Order Specific Question:   Diet:     Answer:   Regular [1]       ACTIVITY  As tolerated.  Weight bearing as tolerated    CONSULTATIONS  Neurosurgery    PROCEDURES  1/23/2020  Microscopic bilateral L3-L4 partial laminectomies, left   medial facetectomy, microscopic excision of herniated disk, decompression of   thecal sac and left L4 nerve root.    LABORATORY  Lab Results   Component Value Date    SODIUM 140 01/23/2020    POTASSIUM 4.0 01/23/2020    CHLORIDE 106 01/23/2020    CO2 26 01/23/2020    GLUCOSE 82 01/23/2020    BUN 16 01/23/2020    CREATININE 0.82 01/23/2020        Lab Results   Component Value Date    WBC 11.0 (H) 01/23/2020    HEMOGLOBIN 13.1 01/23/2020    HEMATOCRIT 39.6 01/23/2020    PLATELETCT 267 01/23/2020      DX-PORTABLE FLUORO > 1 HOUR   Final Result      Portable fluoroscopy utilized for 1 second.         INTERPRETING LOCATION: [address] [place] [address] [place], [id number]       DX-SPINE-ANY ONE VIEW   Final Result      Fluoroscopic image(s) obtained during lumbar spinal instrumentation. Please see the patient's chart for full procedural details.      Fluoroscopy time 1 second.         DX-CHEST-2 VIEWS   Final Result      No evidence of acute cardiopulmonary disease      MR-LUMBAR SPINE-W/O   Final Result      1.  Previous laminectomy and bilateral pedicle screw and fatoumata fixation at the L5-S1 level. The right-sided pedicle screw at the L5 level courses medial to the right-sided pedicle.      2.  Grade 1 spondylolisthesis at the L5-S1 level.      3.  Large left paracentral disc extrusion at the L3-4 level markedly compressing the left ventral surface of thecal sac and the left L4 nerve root in its lateral recess.      4.  Small left paracentral disc extrusion at the L4-5 level effacing left ventral surface of thecal sac.      5.  Mild central canal stenosis at the L3-4 level secondary to facet arthropathy.      6.  Minimal to mild multilevel lumbar spondylotic change.      OUTSIDE IMAGES-CT LUMBAR SPINE   Final Result            Total time of the discharge process exceeds 37 minutes.

## 2020-01-27 NOTE — FACE TO FACE
Face to Face Note  -  Durable Medical Equipment    Mat Camarena M.D. - NPI: [id number]  I certify that this patient is under my care and that they had a durable medical equipment(DME)face to face encounter by myself that meets the physician DME face-to-face encounter requirements with this patient on:    Date of encounter:   Patient:                    MRN:                       YOB: 2020  Cira Ferguson  [id number]  1980     The encounter with the patient was in whole, or in part, for the following medical condition, which is the primary reason for durable medical equipment:  Post-Op Surgery    I certify that, based on my findings, the following durable medical equipment is medically necessary:  Walkers.  Shower chair    HOME O2 Saturation Measurements:(Values must be present for Home Oxygen orders)         ,     ,         My Clinical findings support the need for the above equipment due to:  Abnormal Gait    Supporting Symptoms: poor balance

## 2020-02-08 ENCOUNTER — HOSPITAL ENCOUNTER (EMERGENCY)
Facility: MEDICAL CENTER | Age: 40
End: 2020-02-08
Attending: EMERGENCY MEDICINE
Payer: MEDICAID

## 2020-02-08 VITALS
OXYGEN SATURATION: 97 % | DIASTOLIC BLOOD PRESSURE: 79 MMHG | WEIGHT: 230 LBS | SYSTOLIC BLOOD PRESSURE: 116 MMHG | HEIGHT: 72 IN | BODY MASS INDEX: 31.15 KG/M2 | RESPIRATION RATE: 18 BRPM | HEART RATE: 75 BPM | TEMPERATURE: 98.1 F

## 2020-02-08 DIAGNOSIS — R45.851 SUICIDAL IDEATION: ICD-10-CM

## 2020-02-08 DIAGNOSIS — M54.50 CHRONIC LOW BACK PAIN, UNSPECIFIED BACK PAIN LATERALITY, UNSPECIFIED WHETHER SCIATICA PRESENT: ICD-10-CM

## 2020-02-08 DIAGNOSIS — G89.29 CHRONIC LOW BACK PAIN, UNSPECIFIED BACK PAIN LATERALITY, UNSPECIFIED WHETHER SCIATICA PRESENT: ICD-10-CM

## 2020-02-08 DIAGNOSIS — F43.21 SITUATIONAL DEPRESSION: ICD-10-CM

## 2020-02-08 DIAGNOSIS — Z59.00 HOMELESSNESS: ICD-10-CM

## 2020-02-08 LAB
AMPHET UR QL SCN: POSITIVE
BARBITURATES UR QL SCN: NEGATIVE
BENZODIAZ UR QL SCN: NEGATIVE
BZE UR QL SCN: NEGATIVE
CANNABINOIDS UR QL SCN: NEGATIVE
METHADONE UR QL SCN: NEGATIVE
OPIATES UR QL SCN: NEGATIVE
OXYCODONE UR QL SCN: NEGATIVE
PCP UR QL SCN: NEGATIVE
POC BREATHALIZER: 0 PERCENT (ref 0–0.01)
PROPOXYPH UR QL SCN: NEGATIVE

## 2020-02-08 PROCEDURE — 700111 HCHG RX REV CODE 636 W/ 250 OVERRIDE (IP): Performed by: EMERGENCY MEDICINE

## 2020-02-08 PROCEDURE — 700102 HCHG RX REV CODE 250 W/ 637 OVERRIDE(OP): Performed by: EMERGENCY MEDICINE

## 2020-02-08 PROCEDURE — 99284 EMERGENCY DEPT VISIT MOD MDM: CPT | Performed by: PSYCHIATRY & NEUROLOGY

## 2020-02-08 PROCEDURE — 90791 PSYCH DIAGNOSTIC EVALUATION: CPT

## 2020-02-08 PROCEDURE — 80307 DRUG TEST PRSMV CHEM ANLYZR: CPT

## 2020-02-08 PROCEDURE — A9270 NON-COVERED ITEM OR SERVICE: HCPCS | Performed by: EMERGENCY MEDICINE

## 2020-02-08 PROCEDURE — 99285 EMERGENCY DEPT VISIT HI MDM: CPT

## 2020-02-08 PROCEDURE — 302970 POC BREATHALIZER: Performed by: EMERGENCY MEDICINE

## 2020-02-08 RX ORDER — PREDNISONE 20 MG/1
60 TABLET ORAL DAILY
Status: DISCONTINUED | OUTPATIENT
Start: 2020-02-08 | End: 2020-02-08 | Stop reason: HOSPADM

## 2020-02-08 RX ORDER — METHOCARBAMOL 750 MG/1
750 TABLET, FILM COATED ORAL EVERY 8 HOURS PRN
Status: DISCONTINUED | OUTPATIENT
Start: 2020-02-08 | End: 2020-02-08 | Stop reason: HOSPADM

## 2020-02-08 RX ORDER — HALOPERIDOL 5 MG/1
5 TABLET ORAL 3 TIMES DAILY
Status: DISCONTINUED | OUTPATIENT
Start: 2020-02-08 | End: 2020-02-08 | Stop reason: HOSPADM

## 2020-02-08 RX ORDER — TRIHEXYPHENIDYL HYDROCHLORIDE 2 MG/1
2 TABLET ORAL
Status: DISCONTINUED | OUTPATIENT
Start: 2020-02-08 | End: 2020-02-08 | Stop reason: HOSPADM

## 2020-02-08 RX ORDER — GABAPENTIN 300 MG/1
600 CAPSULE ORAL 3 TIMES DAILY
Status: DISCONTINUED | OUTPATIENT
Start: 2020-02-08 | End: 2020-02-08 | Stop reason: HOSPADM

## 2020-02-08 RX ORDER — PREDNISONE 20 MG/1
60 TABLET ORAL DAILY
Status: DISCONTINUED | OUTPATIENT
Start: 2020-02-08 | End: 2020-02-08

## 2020-02-08 RX ADMIN — HALOPERIDOL 5 MG: 5 TABLET ORAL at 02:49

## 2020-02-08 RX ADMIN — GABAPENTIN 600 MG: 300 CAPSULE ORAL at 10:14

## 2020-02-08 RX ADMIN — METHOCARBAMOL TABLETS 750 MG: 750 TABLET, COATED ORAL at 02:48

## 2020-02-08 RX ADMIN — HALOPERIDOL 5 MG: 5 TABLET ORAL at 10:15

## 2020-02-08 RX ADMIN — GABAPENTIN 600 MG: 300 CAPSULE ORAL at 02:48

## 2020-02-08 RX ADMIN — PREDNISONE 60 MG: 20 TABLET ORAL at 02:48

## 2020-02-08 SDOH — ECONOMIC STABILITY - HOUSING INSECURITY: HOMELESSNESS UNSPECIFIED: Z59.00

## 2020-02-08 ASSESSMENT — LIFESTYLE VARIABLES: SUBSTANCE_ABUSE: 1

## 2020-02-08 ASSESSMENT — ENCOUNTER SYMPTOMS
NEUROLOGICAL NEGATIVE: 1
CONSTITUTIONAL NEGATIVE: 1
RESPIRATORY NEGATIVE: 1
DEPRESSION: 1
GASTROINTESTINAL NEGATIVE: 1
BACK PAIN: 1
EYES NEGATIVE: 1
HALLUCINATIONS: 1
CARDIOVASCULAR NEGATIVE: 1

## 2020-02-08 NOTE — DISCHARGE PLANNING
Medical Social Work    Referral: Legal Hold    Intervention: Legal Hold Paperwork given to SW by Life Skills RN Reagan    Legal Hold Initiated: Date: 2/8/2020 Time: 0200    Patient’s Insurance Listed on Face Sheet: Medicaid FFS    Referrals sent to: [facility], [facility], and [facility].    This referral contains the following information:  1) Face sheet __x__  2) Page 1 and Page 2 of Legal Hold __x__  3) Alert Team Assessment/Psych Assessment __x__  4) Head to toe physical exam __x__  5) Urine Drug Screen __x__  6) Blood Alcohol __x__  7) Vital signs __x__  8) Pregnancy test when applicable _x__  9) Medications list __x__    Plan: Patient will transfer to mental health facility once acceptance is obtained

## 2020-02-08 NOTE — ED NOTES
VSS on room air. Pt provided water, no other needs at this time. 1:1 sitter within direct view or pt.

## 2020-02-08 NOTE — ED PROVIDER NOTES
ED Provider Note    Patient has been seen by the alert team and by our inpatient psychiatrist.  Her legal hold has been lifted.  Patient contracts for safety.  She is discharged home with resources
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
## 2020-02-08 NOTE — ED TRIAGE NOTES
Chief Complaint   Patient presents with   • Suicidal Ideation   • Back Pain     recent laminectomy on L1-3 on Tuesday     Pt ambulatory to room 36 with steady gait. Pt reports having a laminectomy on Tuesday and reports she has been sleeping on the ground outside and is experiencing back pain and leg numbness.     Pt has history of schizoaffective disorder and borderline personality disorder. Pt reports being off her haldol and keeps having auditory hallucinations of voices telling her to hurt herself. Pt states she would kill herself by jumping in front of a car. Denies HI. Pt also reports she has been using meth for the past 4 days. Last used 4 hours ago.
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
## 2020-02-08 NOTE — ED NOTES
Pt sleeping on left lateral side. NAD noted. Observed even unlabored chest rise and fall. 1:1 sitter situated within direct view of pt. Frequent rounding in place

## 2020-02-08 NOTE — ED NOTES
Report received from DESMOND Camejo. Pt sleeping on right lateral side. NAD noted. 1:1 sitter situated within direct view of pt.

## 2020-02-08 NOTE — DISCHARGE PLANNING
ALERT team  note:  39 year old female admitted this AM, legal hold, SI; Medicaid FFS insurance plan; pt evaluated by [facility] ED psychiatrist, Dr. Sheehan, legal hold DC'd; no SI, HI, or self-harm ideation noted; writer RN reviewed community MH and CD resources with pt, with written information given, including [facility]); writer RN faxed pt referral to [facility] fax #[phone]; pt verbalized understanding; pt to DC to [facility] today with transport by [facility];

## 2020-02-20 ENCOUNTER — HOSPITAL ENCOUNTER (EMERGENCY)
Dept: HOSPITAL 8 - ED | Age: 40
Discharge: LEFT BEFORE DISCHARGE | End: 2020-02-20
Payer: MEDICAID

## 2020-02-20 VITALS — BODY MASS INDEX: 41.02 KG/M2 | WEIGHT: 293 LBS | HEIGHT: 71 IN

## 2020-02-20 DIAGNOSIS — M54.5: Primary | ICD-10-CM

## 2020-02-20 DIAGNOSIS — R50.9: ICD-10-CM

## 2020-02-20 DIAGNOSIS — R32: ICD-10-CM

## 2020-02-20 LAB
ALBUMIN SERPL-MCNC: 3.4 G/DL (ref 3.4–5)
ALP SERPL-CCNC: 75 U/L (ref 45–117)
ALT SERPL-CCNC: 15 U/L (ref 12–78)
ANION GAP SERPL CALC-SCNC: 8 MMOL/L (ref 5–15)
BASOPHILS # BLD AUTO: 0.04 X10^3/UL (ref 0–0.1)
BASOPHILS NFR BLD AUTO: 0 % (ref 0–1)
BILIRUB SERPL-MCNC: 0.7 MG/DL (ref 0.2–1)
CALCIUM SERPL-MCNC: 8.8 MG/DL (ref 8.5–10.1)
CHLORIDE SERPL-SCNC: 103 MMOL/L (ref 98–107)
CREAT SERPL-MCNC: 0.9 MG/DL (ref 0.55–1.02)
EOSINOPHIL # BLD AUTO: 0.01 X10^3/UL (ref 0–0.4)
EOSINOPHIL NFR BLD AUTO: 0 % (ref 1–7)
ERYTHROCYTE [DISTWIDTH] IN BLOOD BY AUTOMATED COUNT: 13.4 % (ref 9.6–15.2)
LYMPHOCYTES # BLD AUTO: 1.83 X10^3/UL (ref 1–3.4)
LYMPHOCYTES NFR BLD AUTO: 10 % (ref 22–44)
MCH RBC QN AUTO: 31.1 PG (ref 27–34.8)
MCHC RBC AUTO-ENTMCNC: 33.8 G/DL (ref 32.4–35.8)
MCV RBC AUTO: 91.8 FL (ref 80–100)
MD: (no result)
MONOCYTES # BLD AUTO: 0.89 X10^3/UL (ref 0.2–0.8)
MONOCYTES NFR BLD AUTO: 5 % (ref 2–9)
NEUTROPHILS # BLD AUTO: 15.68 X10^3/UL (ref 1.8–6.8)
NEUTROPHILS NFR BLD AUTO: 85 % (ref 42–75)
PLATELET # BLD AUTO: 258 X10^3/UL (ref 130–400)
PMV BLD AUTO: 8.3 FL (ref 7.4–10.4)
PROT SERPL-MCNC: 7.2 G/DL (ref 6.4–8.2)
RBC # BLD AUTO: 4.3 X10^6/UL (ref 3.82–5.3)

## 2020-02-20 PROCEDURE — 80053 COMPREHEN METABOLIC PANEL: CPT

## 2020-02-20 PROCEDURE — 99283 EMERGENCY DEPT VISIT LOW MDM: CPT

## 2020-02-20 PROCEDURE — 85025 COMPLETE CBC W/AUTO DIFF WBC: CPT

## 2020-02-20 PROCEDURE — 83605 ASSAY OF LACTIC ACID: CPT

## 2020-02-20 PROCEDURE — 36415 COLL VENOUS BLD VENIPUNCTURE: CPT

## 2020-02-20 PROCEDURE — 87040 BLOOD CULTURE FOR BACTERIA: CPT

## 2020-03-17 ENCOUNTER — APPOINTMENT (OUTPATIENT)
Dept: RADIOLOGY | Facility: MEDICAL CENTER | Age: 40
End: 2020-03-17
Attending: EMERGENCY MEDICINE
Payer: MEDICAID

## 2020-03-17 ENCOUNTER — HOSPITAL ENCOUNTER (EMERGENCY)
Facility: MEDICAL CENTER | Age: 40
End: 2020-03-18
Attending: EMERGENCY MEDICINE
Payer: MEDICAID

## 2020-03-17 DIAGNOSIS — M54.16 LUMBAR RADICULOPATHY: ICD-10-CM

## 2020-03-17 LAB
ANION GAP SERPL CALC-SCNC: 11 MMOL/L (ref 7–16)
APTT PPP: 30.1 SEC (ref 24.7–36)
BASOPHILS # BLD AUTO: 0.3 % (ref 0–1.8)
BASOPHILS # BLD: 0.03 K/UL (ref 0–0.12)
BUN SERPL-MCNC: 9 MG/DL (ref 8–22)
CALCIUM SERPL-MCNC: 9.7 MG/DL (ref 8.5–10.5)
CHLORIDE SERPL-SCNC: 101 MMOL/L (ref 96–112)
CO2 SERPL-SCNC: 23 MMOL/L (ref 20–33)
CREAT SERPL-MCNC: 0.75 MG/DL (ref 0.5–1.4)
CRP SERPL HS-MCNC: 2.5 MG/DL (ref 0–0.75)
EOSINOPHIL # BLD AUTO: 0.12 K/UL (ref 0–0.51)
EOSINOPHIL NFR BLD: 1.3 % (ref 0–6.9)
ERYTHROCYTE [DISTWIDTH] IN BLOOD BY AUTOMATED COUNT: 39.9 FL (ref 35.9–50)
ERYTHROCYTE [SEDIMENTATION RATE] IN BLOOD BY WESTERGREN METHOD: 30 MM/HOUR (ref 0–20)
GLUCOSE SERPL-MCNC: 76 MG/DL (ref 65–99)
HCT VFR BLD AUTO: 37.2 % (ref 37–47)
HGB BLD-MCNC: 12.6 G/DL (ref 12–16)
IMM GRANULOCYTES # BLD AUTO: 0.01 K/UL (ref 0–0.11)
IMM GRANULOCYTES NFR BLD AUTO: 0.1 % (ref 0–0.9)
INR PPP: 1.08 (ref 0.87–1.13)
LYMPHOCYTES # BLD AUTO: 3.41 K/UL (ref 1–4.8)
LYMPHOCYTES NFR BLD: 37.8 % (ref 22–41)
MCH RBC QN AUTO: 30.1 PG (ref 27–33)
MCHC RBC AUTO-ENTMCNC: 33.9 G/DL (ref 33.6–35)
MCV RBC AUTO: 89 FL (ref 81.4–97.8)
MONOCYTES # BLD AUTO: 0.53 K/UL (ref 0–0.85)
MONOCYTES NFR BLD AUTO: 5.9 % (ref 0–13.4)
NEUTROPHILS # BLD AUTO: 4.93 K/UL (ref 2–7.15)
NEUTROPHILS NFR BLD: 54.6 % (ref 44–72)
NRBC # BLD AUTO: 0 K/UL
NRBC BLD-RTO: 0 /100 WBC
PLATELET # BLD AUTO: 270 K/UL (ref 164–446)
PMV BLD AUTO: 9.4 FL (ref 9–12.9)
POTASSIUM SERPL-SCNC: 3.8 MMOL/L (ref 3.6–5.5)
PROTHROMBIN TIME: 14.2 SEC (ref 12–14.6)
RBC # BLD AUTO: 4.18 M/UL (ref 4.2–5.4)
SODIUM SERPL-SCNC: 135 MMOL/L (ref 135–145)
WBC # BLD AUTO: 9 K/UL (ref 4.8–10.8)

## 2020-03-17 PROCEDURE — 85610 PROTHROMBIN TIME: CPT

## 2020-03-17 PROCEDURE — 85025 COMPLETE CBC W/AUTO DIFF WBC: CPT

## 2020-03-17 PROCEDURE — 80048 BASIC METABOLIC PNL TOTAL CA: CPT

## 2020-03-17 PROCEDURE — 86140 C-REACTIVE PROTEIN: CPT

## 2020-03-17 PROCEDURE — 85652 RBC SED RATE AUTOMATED: CPT

## 2020-03-17 PROCEDURE — 700111 HCHG RX REV CODE 636 W/ 250 OVERRIDE (IP): Performed by: EMERGENCY MEDICINE

## 2020-03-17 PROCEDURE — 72148 MRI LUMBAR SPINE W/O DYE: CPT

## 2020-03-17 PROCEDURE — 96374 THER/PROPH/DIAG INJ IV PUSH: CPT

## 2020-03-17 PROCEDURE — 700102 HCHG RX REV CODE 250 W/ 637 OVERRIDE(OP): Performed by: EMERGENCY MEDICINE

## 2020-03-17 PROCEDURE — A9270 NON-COVERED ITEM OR SERVICE: HCPCS | Performed by: EMERGENCY MEDICINE

## 2020-03-17 PROCEDURE — 85730 THROMBOPLASTIN TIME PARTIAL: CPT

## 2020-03-17 PROCEDURE — 99285 EMERGENCY DEPT VISIT HI MDM: CPT

## 2020-03-17 RX ORDER — LORAZEPAM 2 MG/ML
1 INJECTION INTRAMUSCULAR ONCE
Status: DISCONTINUED | OUTPATIENT
Start: 2020-03-17 | End: 2020-03-17

## 2020-03-17 RX ORDER — ACETAMINOPHEN 325 MG/1
650 TABLET ORAL EVERY 6 HOURS PRN
COMMUNITY
End: 2020-10-12

## 2020-03-17 RX ORDER — TRAZODONE HYDROCHLORIDE 100 MG/1
100 TABLET ORAL EVERY EVENING
COMMUNITY
End: 2020-10-12

## 2020-03-17 RX ORDER — HYDROCODONE BITARTRATE AND ACETAMINOPHEN 5; 325 MG/1; MG/1
1 TABLET ORAL ONCE
Status: COMPLETED | OUTPATIENT
Start: 2020-03-18 | End: 2020-03-17

## 2020-03-17 RX ORDER — LORAZEPAM 2 MG/ML
1 INJECTION INTRAMUSCULAR ONCE
Status: COMPLETED | OUTPATIENT
Start: 2020-03-17 | End: 2020-03-17

## 2020-03-17 RX ORDER — HYDROCODONE BITARTRATE AND ACETAMINOPHEN 5; 325 MG/1; MG/1
1 TABLET ORAL EVERY 6 HOURS PRN
Qty: 11 TAB | Refills: 0 | Status: SHIPPED | OUTPATIENT
Start: 2020-03-17 | End: 2020-03-20

## 2020-03-17 RX ADMIN — HYDROCODONE BITARTRATE AND ACETAMINOPHEN 1 TABLET: 5; 325 TABLET ORAL at 23:53

## 2020-03-17 RX ADMIN — LORAZEPAM 1 MG: 2 INJECTION INTRAMUSCULAR; INTRAVENOUS at 22:15

## 2020-03-17 ASSESSMENT — LIFESTYLE VARIABLES
HAVE PEOPLE ANNOYED YOU BY CRITICIZING YOUR DRINKING: NO
TOTAL SCORE: 0
TOTAL SCORE: 0
DO YOU DRINK ALCOHOL: NO
CONSUMPTION TOTAL: INCOMPLETE
DOES PATIENT WANT TO STOP DRINKING: NO
EVER FELT BAD OR GUILTY ABOUT YOUR DRINKING: NO
EVER HAD A DRINK FIRST THING IN THE MORNING TO STEADY YOUR NERVES TO GET RID OF A HANGOVER: NO
TOTAL SCORE: 0
HAVE YOU EVER FELT YOU SHOULD CUT DOWN ON YOUR DRINKING: NO

## 2020-03-18 ENCOUNTER — HOSPITAL ENCOUNTER (EMERGENCY)
Dept: HOSPITAL 8 - ED | Age: 40
Discharge: HOME | End: 2020-03-18
Payer: MEDICAID

## 2020-03-18 VITALS
HEART RATE: 87 BPM | OXYGEN SATURATION: 97 % | BODY MASS INDEX: 41.02 KG/M2 | TEMPERATURE: 97.8 F | SYSTOLIC BLOOD PRESSURE: 118 MMHG | WEIGHT: 293 LBS | DIASTOLIC BLOOD PRESSURE: 76 MMHG | HEIGHT: 71 IN | RESPIRATION RATE: 18 BRPM

## 2020-03-18 VITALS — WEIGHT: 293 LBS | BODY MASS INDEX: 39.68 KG/M2 | HEIGHT: 72 IN

## 2020-03-18 VITALS — DIASTOLIC BLOOD PRESSURE: 63 MMHG | SYSTOLIC BLOOD PRESSURE: 124 MMHG

## 2020-03-18 DIAGNOSIS — F11.10: ICD-10-CM
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
## 2020-03-18 DIAGNOSIS — F15.10: ICD-10-CM
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
## 2020-03-18 DIAGNOSIS — M47.816: Primary | ICD-10-CM

## 2020-03-18 DIAGNOSIS — I51.7: ICD-10-CM

## 2020-03-18 DIAGNOSIS — F16.10: ICD-10-CM
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
## 2020-03-18 DIAGNOSIS — Z90.710: ICD-10-CM
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
## 2020-03-18 DIAGNOSIS — G89.29: ICD-10-CM

## 2020-03-18 DIAGNOSIS — F10.10: ICD-10-CM

## 2020-03-18 DIAGNOSIS — M43.16: ICD-10-CM

## 2020-03-18 PROCEDURE — 72110 X-RAY EXAM L-2 SPINE 4/>VWS: CPT

## 2020-03-18 PROCEDURE — 96372 THER/PROPH/DIAG INJ SC/IM: CPT

## 2020-03-18 PROCEDURE — 93005 ELECTROCARDIOGRAM TRACING: CPT

## 2020-03-18 PROCEDURE — 99284 EMERGENCY DEPT VISIT MOD MDM: CPT

## 2020-03-18 NOTE — ED PROVIDER NOTES
ED Provider Note    ED Provider Note      Primary care provider: BARBARA Hughes    CHIEF COMPLAINT  Chief Complaint   Patient presents with   • Numbness     Lower extremity numbness, started 5 days ago, patient is unable to control bowl. ambulatory       HPI  Cira Ferguson is a 39 y.o. female who presents to the Emergency Department with chief complaint of lower extremity weakness numbness fecal incontinence urinary incontinence.  Patient reports lumbar back surgery approximately 8 weeks prior.  She stated that she was having numbness tingling weakness prior to the surgery.  She states that 5 days prior she twisted out of bed abnormally and felt a popping sensation.  Since then she has been having progressive numbness and tingling in the right foot.  She reports that she experiences foot drop after approximately 30 to 40 feet of ambulation.  She reports that she had fecal incontinence twice throughout the day today.  She also reports that she has been leaking urine on herself.  Patient states that she has not been taking any pain medication and that she has been self-medicating on the streets she states that she is injected both heroin and methamphetamine today.    REVIEW OF SYSTEMS  10 systems reviewed and otherwise negative, pertinent positives and negatives listed in the history of present illness.    PAST MEDICAL HISTORY   has a past medical history of Bipolar disorder ([facility]), Cervical cancer ([facility]), DVT (deep venous thrombosis) ([facility]) (2013), Fall, Hepatitis C, Infectious disease, IV drug abuse ([facility]), PE (pulmonary thromboembolism) ([facility]) (2013), Psychiatric disorder, Schizoaffective disorder ([facility]), and Schizophrenia ([facility]).    SURGICAL HISTORY   has a past surgical history that includes lumbar laminectomy diskectomy (5/17/2011); gyn surgery; primary c section; lumbar laminectomy diskectomy (11/10/2011); lumbar fusion posterior (11/5/2012); lumbar decompression (11/5/2012); vaginal  "hysterectomy scope total (10/23/2014); salpingectomy (10/23/2014); anterior and posterior repair (10/23/2014); enterocele repair (10/23/2014); vaginal suspension (10/23/2014); bladder sling female (10/23/2014); cystoscopy (10/23/2014); and laminotomy,lumbar disk,1 intrsp (Left, 1/23/2020).    SOCIAL HISTORY  Social History     Tobacco Use   • Smoking status: Current Every Day Smoker     Packs/day: 0.50     Years: 15.00     Pack years: 7.50     Types: Cigarettes   • Smokeless tobacco: Never Used   Substance Use Topics   • Alcohol use: No   • Drug use: Yes     Types: Inhaled     Comment: meth herion use yesterday      Social History     Substance and Sexual Activity   Drug Use Yes   • Types: Inhaled    Comment: meth herion use yesterday       FAMILY HISTORY  Non-Contributory    CURRENT MEDICATIONS  Home Medications     Reviewed by Ortiz Wallace R.N. (Registered Nurse) on 03/17/20 at 2013  Med List Status: <None>   Medication Last Dose Status   gabapentin (NEURONTIN) 600 MG tablet  Active   haloperidol (HALDOL) 5 MG Tab  Active   methocarbamol (ROBAXIN) 750 MG Tab  Active   trihexyphenidyl (ARTANE) 2 MG Tab  Active                ALLERGIES  Allergies   Allergen Reactions   • Contrast Media With Iodine [Iodine]    • Hydromorphone Hives and Itching   • Sulfa Drugs      Unknown rxn       PHYSICAL EXAM  VITAL SIGNS: /86   Pulse (!) 102   Temp 36.6 °C (97.8 °F) (Oral)   Resp 16   Ht 1.803 m (5' 11\")   Wt (!) 134.8 kg (297 lb 2.9 oz)   LMP 05/01/2015   SpO2 96%   BMI 41.45 kg/m²   Pulse ox interpretation: I interpret this pulse ox as normal.  Constitutional: Alert and oriented x 3, no acute distress  HEENT: Atraumatic normocephalic, pupils are equal round reactive to light extraocular movements are intact. The nares is clear, external ears are normal, mouth shows moist mucous membranes  Neck: Supple, no JVD no tracheal deviation  Cardiovascular: Borderline tachycardic no murmur rub or gallop 2+ " pulses peripherally x4  Thorax & Lungs: No respiratory distress, no wheezes rales or rhonchi, No chest tenderness.   GI: Soft nontender nondistended positive bowel sounds, no peritoneal signs  : Deferred  Rectal: Normal rectal tone brown stool in rectal vault  Skin: Warm dry no acute rash or lesion  Musculoskeletal: Moving all extremities with full range and 5 of 5 strength, no acute  deformity ambulating without difficulty  Neurologic: Cranial nerves III through XII are grossly intact, no sensory deficit, no cerebellar dysfunction   Psychiatric: Appropriate affect for situation at this time      DIAGNOSTIC STUDIES / PROCEDURES  LABS    Results for orders placed or performed during the hospital encounter of 03/17/20   CBC WITH DIFFERENTIAL   Result Value Ref Range    WBC 9.0 4.8 - 10.8 K/uL    RBC 4.18 (L) 4.20 - 5.40 M/uL    Hemoglobin 12.6 12.0 - 16.0 g/dL    Hematocrit 37.2 37.0 - 47.0 %    MCV 89.0 81.4 - 97.8 fL    MCH 30.1 27.0 - 33.0 pg    MCHC 33.9 33.6 - 35.0 g/dL    RDW 39.9 35.9 - 50.0 fL    Platelet Count 270 164 - 446 K/uL    MPV 9.4 9.0 - 12.9 fL    Neutrophils-Polys 54.60 44.00 - 72.00 %    Lymphocytes 37.80 22.00 - 41.00 %    Monocytes 5.90 0.00 - 13.40 %    Eosinophils 1.30 0.00 - 6.90 %    Basophils 0.30 0.00 - 1.80 %    Immature Granulocytes 0.10 0.00 - 0.90 %    Nucleated RBC 0.00 /100 WBC    Neutrophils (Absolute) 4.93 2.00 - 7.15 K/uL    Lymphs (Absolute) 3.41 1.00 - 4.80 K/uL    Monos (Absolute) 0.53 0.00 - 0.85 K/uL    Eos (Absolute) 0.12 0.00 - 0.51 K/uL    Baso (Absolute) 0.03 0.00 - 0.12 K/uL    Immature Granulocytes (abs) 0.01 0.00 - 0.11 K/uL    NRBC (Absolute) 0.00 K/uL   BASIC METABOLIC PANEL   Result Value Ref Range    Sodium 135 135 - 145 mmol/L    Potassium 3.8 3.6 - 5.5 mmol/L    Chloride 101 96 - 112 mmol/L    Co2 23 20 - 33 mmol/L    Glucose 76 65 - 99 mg/dL    Bun 9 8 - 22 mg/dL    Creatinine 0.75 0.50 - 1.40 mg/dL    Calcium 9.7 8.5 - 10.5 mg/dL    Anion Gap 11.0 7.0 - 16.0    PROTHROMBIN TIME   Result Value Ref Range    PT 14.2 12.0 - 14.6 sec    INR 1.08 0.87 - 1.13   APTT   Result Value Ref Range    APTT 30.1 24.7 - 36.0 sec   CRP QUANTITIVE (NON-CARDIAC)   Result Value Ref Range    Stat C-Reactive Protein 2.50 (H) 0.00 - 0.75 mg/dL   Sed Rate   Result Value Ref Range    Sed Rate Westergren 30 (H) 0 - 20 mm/hour   ESTIMATED GFR   Result Value Ref Range    GFR If African American >60 >60 mL/min/1.73 m 2    GFR If Non African American >60 >60 mL/min/1.73 m 2           All labs reviewed by me.      RADIOLOGY  MR-LUMBAR SPINE-W/O    (Results Pending)     The radiologist's interpretation of all radiological studies have been reviewed by me.    COURSE & MEDICAL DECISION MAKING  Pertinent Labs & Imaging studies reviewed. (See chart for details)    8:40 PM - Patient seen and examined at bedside.         Patient noted to have slightly elevated blood pressure likely circumstantial secondary to presenting complaint. Referred to primary care physician for further evaluation.      Medical Decision Making: Patient is much more comfortable after interventions MRI was completed there is an L3-L4 left disc bulge with some compression of the nerve root in that area but there is no central stenosis or central compromise there is no evidence of hematoma or epidural abscess.  I discussed the findings and reviewed the MRI with the patient's neurosurgeon Dr. Garcia.  We are both in agreement that the patient does not require any emergent intervention at this point.  Patient be given instructions to follow-up with Dr. Hartman as an outpatient.  Patient does have history of street drug use which is somewhat concerning however she states she was doing this to self medicate per review she has not had any prescriptions for any controlled substances in several months.  Patient given small amount of Norco to deal with the symptoms in hopes that she will use this medication rather than return to illicit substances.   "She otherwise is understanding agreeable to treatment plan discharged in stable and improved condition.    Prescription monitoring program queried and unremarkable.  Patient counseled on the risks of controlled substances including potential risks and benefits proper use alternative treatments, cause the symptoms, provisions of treatment plan, risk of dependence addiction and overdose method safely dispose of the medication, the fact that they would be given no refills from the emergency department.      In prescribing controlled substances to this patient, I certify that I have obtained and reviewed the medical history of Cira Ferguson. I have also made a good rik effort to obtain applicable records from other providers who have treated the patient and records demonstrating the following: Last prescription for controlled substances 2 months prior.     I have conducted a physical exam and documented it. I have reviewed Ms. Ferguson’s prescription history as maintained by the [place] Prescription Monitoring Program.     I have assessed the patient’s risk for abuse, dependency, and addiction using the validated Opioid Risk Tool available at https://www.[employer].com/ehqhes-gvwn-vnih-ort-narcotic-abuse.     Given the above, I believe the benefits of controlled substance therapy outweigh the risks. The reasons for prescribing controlled substances include non-narcotic, oral analgesic alternatives have been inadequate for pain control. Accordingly, I have discussed the risk and benefits, treatment plan, and alternative therapies with the patient.           /76   Pulse 87   Temp 36.6 °C (97.8 °F) (Oral)   Resp 18   Ht 1.803 m (5' 11\")   Wt (!) 134.8 kg (297 lb 2.9 oz)   LMP 05/01/2015   SpO2 97%   BMI 41.45 kg/m²     Tae Garcia M.D.  [address] [place] [id number]  [phone]    Schedule an appointment as soon as possible for a visit       [facility], Emergency Dept  [address] " [facility] [id number] [phone]    immediately if symptoms worsen      New Prescriptions    HYDROCODONE-ACETAMINOPHEN (NORCO) 5-325 MG TAB PER TABLET    Take 1 Tab by mouth every 6 hours as needed for up to 3 days.       FINAL IMPRESSION  1. Lumbar radiculopathy Active         This dictation has been created using voice recognition software and/or scribes. The accuracy of the dictation is limited by the abilities of the software and the expertise of the scribes. I expect there may be some errors of grammar and possibly content. I made every attempt to manually correct the errors within my dictation. However, errors related to voice recognition software and/or scribes may still exist and should be interpreted within the appropriate context.

## 2020-03-18 NOTE — ED NOTES
Pt to ED14 c/o b/l lwr ext weakness o/s 5 days prior pt reporting R side weaker than left. Pt states she had lwr back laminectomy and discectomy 8 weeks prior. Pt reporting incontinence to urine and stool o/s today. Pt c/o 8/10 pain to lwr ext. Pedal color, temp, and pulses equal and bilateral in lwr ext

## 2020-03-18 NOTE — NUR
First contact with patient. 

Patient presents to ER c/o low back pain post GLF. Patient slipped on water and 
fell backward. Patinet denies head trauma. Denies LOC. Patient states she has 
multiple back surgeries. Patient takes Roxicet at home for pain but has been 
out for 8 days. She states she has turned to street drugs, "any I can get my 
hands on," in the meantime. Patient wants to get off the street drugs. 



Patient is in NAD. Respirations even and unlabored.

## 2020-03-18 NOTE — ED NOTES
Discharge instructions reviewed, iv removed, pt communicated understanding. Ambulated to restroom with steady gait prior to d/c

## 2020-03-18 NOTE — ED TRIAGE NOTES
"Cira Ferguson   39 y.o. female   Chief Complaint   Patient presents with   • Numbness     Lower extremity numbness, started 5 days ago, patient is unable to control bowl. ambulatory      Pt amb to triage with steady gait for above complaint.      Pt is alert and oriented, speaking in full sentences, follows commands and responds appropriately to questions. NAD. Resp are even and unlabored.   Pt placed in lobby. Pt educated on triage process. Pt encouraged to alert staff for any changes.    /86   Pulse (!) 102   Temp 36.6 °C (97.8 °F) (Oral)   Resp 16   Ht 1.803 m (5' 11\")   Wt (!) 134.8 kg (297 lb 2.9 oz)   LMP 05/01/2015   SpO2 96%   BMI 41.45 kg/m²     "

## 2020-03-18 NOTE — NUR
Discharge instructions given. All questions and concerns addressed. Patient 
ambulatory with a steady gait. Patient states she wants to go to [facility] for 
detox. Explained the [facility] triage process with her; she understood. Gave 
bus pass. Belongings with patient.

## 2020-03-18 NOTE — ED NOTES
Med rec complete per patient at bedside. Allergies verified. No ABX in last 14 days.  Pt stated was given invega 6mg injection, but could not specify date or time.    [facility]

## 2020-10-12 ENCOUNTER — APPOINTMENT (OUTPATIENT)
Dept: RADIOLOGY | Facility: MEDICAL CENTER | Age: 40
DRG: 204 | End: 2020-10-12
Attending: EMERGENCY MEDICINE
Payer: MEDICARE

## 2020-10-12 ENCOUNTER — HOSPITAL ENCOUNTER (INPATIENT)
Facility: MEDICAL CENTER | Age: 40
LOS: 1 days | DRG: 204 | End: 2020-10-14
Attending: EMERGENCY MEDICINE | Admitting: INTERNAL MEDICINE
Payer: MEDICARE

## 2020-10-12 LAB
ALBUMIN SERPL BCP-MCNC: 4 G/DL (ref 3.2–4.9)
ALBUMIN/GLOB SERPL: 1.3 G/DL
ALP SERPL-CCNC: 58 U/L (ref 30–99)
ALT SERPL-CCNC: 14 U/L (ref 2–50)
ANION GAP SERPL CALC-SCNC: 9 MMOL/L (ref 7–16)
AST SERPL-CCNC: 16 U/L (ref 12–45)
BASOPHILS # BLD AUTO: 0.4 % (ref 0–1.8)
BASOPHILS # BLD: 0.04 K/UL (ref 0–0.12)
BILIRUB SERPL-MCNC: <0.2 MG/DL (ref 0.1–1.5)
BLOOD CULTURE HOLD CXBCH: NORMAL
BUN SERPL-MCNC: 14 MG/DL (ref 8–22)
CALCIUM SERPL-MCNC: 9.8 MG/DL (ref 8.5–10.5)
CHLORIDE SERPL-SCNC: 99 MMOL/L (ref 96–112)
CO2 SERPL-SCNC: 28 MMOL/L (ref 20–33)
COVID ORDER STATUS COVID19: NORMAL
CREAT SERPL-MCNC: 0.81 MG/DL (ref 0.5–1.4)
EKG IMPRESSION: NORMAL
EOSINOPHIL # BLD AUTO: 0.27 K/UL (ref 0–0.51)
EOSINOPHIL NFR BLD: 2.6 % (ref 0–6.9)
ERYTHROCYTE [DISTWIDTH] IN BLOOD BY AUTOMATED COUNT: 38.3 FL (ref 35.9–50)
GLOBULIN SER CALC-MCNC: 3.2 G/DL (ref 1.9–3.5)
GLUCOSE SERPL-MCNC: 100 MG/DL (ref 65–99)
HCT VFR BLD AUTO: 40.2 % (ref 37–47)
HGB BLD-MCNC: 13.2 G/DL (ref 12–16)
IMM GRANULOCYTES # BLD AUTO: 0.04 K/UL (ref 0–0.11)
IMM GRANULOCYTES NFR BLD AUTO: 0.4 % (ref 0–0.9)
LYMPHOCYTES # BLD AUTO: 3.68 K/UL (ref 1–4.8)
LYMPHOCYTES NFR BLD: 34.8 % (ref 22–41)
MAGNESIUM SERPL-MCNC: 1.9 MG/DL (ref 1.5–2.5)
MCH RBC QN AUTO: 29 PG (ref 27–33)
MCHC RBC AUTO-ENTMCNC: 32.8 G/DL (ref 33.6–35)
MCV RBC AUTO: 88.4 FL (ref 81.4–97.8)
MONOCYTES # BLD AUTO: 0.6 K/UL (ref 0–0.85)
MONOCYTES NFR BLD AUTO: 5.7 % (ref 0–13.4)
NEUTROPHILS # BLD AUTO: 5.95 K/UL (ref 2–7.15)
NEUTROPHILS NFR BLD: 56.1 % (ref 44–72)
NRBC # BLD AUTO: 0 K/UL
NRBC BLD-RTO: 0 /100 WBC
NT-PROBNP SERPL IA-MCNC: 36 PG/ML (ref 0–125)
PLATELET # BLD AUTO: 239 K/UL (ref 164–446)
PMV BLD AUTO: 9.9 FL (ref 9–12.9)
POTASSIUM SERPL-SCNC: 4.1 MMOL/L (ref 3.6–5.5)
PROT SERPL-MCNC: 7.2 G/DL (ref 6–8.2)
RBC # BLD AUTO: 4.55 M/UL (ref 4.2–5.4)
SARS-COV-2 RNA RESP QL NAA+PROBE: NOTDETECTED
SODIUM SERPL-SCNC: 136 MMOL/L (ref 135–145)
SPECIMEN SOURCE: NORMAL
TROPONIN T SERPL-MCNC: 6 NG/L (ref 6–19)
WBC # BLD AUTO: 10.6 K/UL (ref 4.8–10.8)

## 2020-10-12 PROCEDURE — 83880 ASSAY OF NATRIURETIC PEPTIDE: CPT

## 2020-10-12 PROCEDURE — C9803 HOPD COVID-19 SPEC COLLECT: HCPCS | Performed by: EMERGENCY MEDICINE

## 2020-10-12 PROCEDURE — 84484 ASSAY OF TROPONIN QUANT: CPT

## 2020-10-12 PROCEDURE — 99285 EMERGENCY DEPT VISIT HI MDM: CPT

## 2020-10-12 PROCEDURE — 93005 ELECTROCARDIOGRAM TRACING: CPT | Performed by: EMERGENCY MEDICINE

## 2020-10-12 PROCEDURE — 83735 ASSAY OF MAGNESIUM: CPT

## 2020-10-12 PROCEDURE — G0378 HOSPITAL OBSERVATION PER HR: HCPCS

## 2020-10-12 PROCEDURE — 700117 HCHG RX CONTRAST REV CODE 255: Performed by: EMERGENCY MEDICINE

## 2020-10-12 PROCEDURE — 85025 COMPLETE CBC W/AUTO DIFF WBC: CPT

## 2020-10-12 PROCEDURE — 71045 X-RAY EXAM CHEST 1 VIEW: CPT

## 2020-10-12 PROCEDURE — 36415 COLL VENOUS BLD VENIPUNCTURE: CPT

## 2020-10-12 PROCEDURE — U0003 INFECTIOUS AGENT DETECTION BY NUCLEIC ACID (DNA OR RNA); SEVERE ACUTE RESPIRATORY SYNDROME CORONAVIRUS 2 (SARS-COV-2) (CORONAVIRUS DISEASE [COVID-19]), AMPLIFIED PROBE TECHNIQUE, MAKING USE OF HIGH THROUGHPUT TECHNOLOGIES AS DESCRIBED BY CMS-2020-01-R: HCPCS

## 2020-10-12 PROCEDURE — 71275 CT ANGIOGRAPHY CHEST: CPT

## 2020-10-12 PROCEDURE — 93005 ELECTROCARDIOGRAM TRACING: CPT

## 2020-10-12 PROCEDURE — 80053 COMPREHEN METABOLIC PANEL: CPT

## 2020-10-12 RX ORDER — MIRTAZAPINE 15 MG/1
7.5 TABLET, FILM COATED ORAL NIGHTLY
Status: DISCONTINUED | OUTPATIENT
Start: 2020-10-12 | End: 2020-10-14 | Stop reason: HOSPADM

## 2020-10-12 RX ORDER — ZIPRASIDONE HYDROCHLORIDE 20 MG/1
20 CAPSULE ORAL EVERY 4 HOURS PRN
COMMUNITY
End: 2021-02-11

## 2020-10-12 RX ORDER — CYCLOBENZAPRINE HCL 10 MG
10 TABLET ORAL 3 TIMES DAILY
Status: DISCONTINUED | OUTPATIENT
Start: 2020-10-12 | End: 2020-10-14 | Stop reason: HOSPADM

## 2020-10-12 RX ORDER — TRAMADOL HYDROCHLORIDE 50 MG/1
50 TABLET ORAL 3 TIMES DAILY
Status: DISCONTINUED | OUTPATIENT
Start: 2020-10-12 | End: 2020-10-14 | Stop reason: HOSPADM

## 2020-10-12 RX ORDER — DIPHENHYDRAMINE HCL 50 MG
50 CAPSULE ORAL EVERY 6 HOURS PRN
COMMUNITY
End: 2021-02-11

## 2020-10-12 RX ORDER — OMEPRAZOLE 20 MG/1
20 CAPSULE, DELAYED RELEASE ORAL DAILY
COMMUNITY
End: 2021-02-11

## 2020-10-12 RX ORDER — TRAMADOL HYDROCHLORIDE 50 MG/1
50 TABLET ORAL 3 TIMES DAILY PRN
COMMUNITY
End: 2021-02-11

## 2020-10-12 RX ORDER — OMEPRAZOLE 20 MG/1
20 CAPSULE, DELAYED RELEASE ORAL DAILY
Status: DISCONTINUED | OUTPATIENT
Start: 2020-10-13 | End: 2020-10-14 | Stop reason: HOSPADM

## 2020-10-12 RX ORDER — GABAPENTIN 300 MG/1
600 CAPSULE ORAL 4 TIMES DAILY
Status: DISCONTINUED | OUTPATIENT
Start: 2020-10-12 | End: 2020-10-14 | Stop reason: HOSPADM

## 2020-10-12 RX ORDER — PRAZOSIN HYDROCHLORIDE 1 MG/1
4 CAPSULE ORAL
Status: DISCONTINUED | OUTPATIENT
Start: 2020-10-12 | End: 2020-10-14 | Stop reason: HOSPADM

## 2020-10-12 RX ORDER — MIRTAZAPINE 15 MG/1
7.5 TABLET, FILM COATED ORAL NIGHTLY
COMMUNITY
End: 2022-06-02

## 2020-10-12 RX ORDER — ZIPRASIDONE HYDROCHLORIDE 20 MG/1
20 CAPSULE ORAL EVERY 4 HOURS PRN
Status: DISCONTINUED | OUTPATIENT
Start: 2020-10-12 | End: 2020-10-14 | Stop reason: HOSPADM

## 2020-10-12 RX ORDER — BISACODYL 10 MG
10 SUPPOSITORY, RECTAL RECTAL
Status: DISCONTINUED | OUTPATIENT
Start: 2020-10-12 | End: 2020-10-14 | Stop reason: HOSPADM

## 2020-10-12 RX ORDER — ALUMINUM HYDROXIDE, MAGNESIUM HYDROXIDE, SIMETHICONE 400; 400; 40 MG/10ML; MG/10ML; MG/10ML
30 SUSPENSION ORAL 4 TIMES DAILY PRN
COMMUNITY
End: 2021-02-11

## 2020-10-12 RX ORDER — LORAZEPAM 1 MG/1
1 TABLET ORAL 2 TIMES DAILY
COMMUNITY
End: 2021-02-11

## 2020-10-12 RX ORDER — LORAZEPAM 2 MG/ML
2 INJECTION INTRAMUSCULAR
COMMUNITY
End: 2021-02-11

## 2020-10-12 RX ORDER — PRAZOSIN HYDROCHLORIDE 2 MG/1
4 CAPSULE ORAL
COMMUNITY
End: 2022-06-02

## 2020-10-12 RX ORDER — AMOXICILLIN 250 MG
2 CAPSULE ORAL 2 TIMES DAILY
Status: DISCONTINUED | OUTPATIENT
Start: 2020-10-12 | End: 2020-10-14 | Stop reason: HOSPADM

## 2020-10-12 RX ORDER — IBUPROFEN 400 MG/1
800 TABLET ORAL 3 TIMES DAILY PRN
Status: ON HOLD | COMMUNITY
End: 2020-10-14

## 2020-10-12 RX ORDER — POLYETHYLENE GLYCOL 3350 17 G/17G
1 POWDER, FOR SOLUTION ORAL
Status: DISCONTINUED | OUTPATIENT
Start: 2020-10-12 | End: 2020-10-14 | Stop reason: HOSPADM

## 2020-10-12 RX ORDER — TRAZODONE HYDROCHLORIDE 100 MG/1
100 TABLET ORAL NIGHTLY
COMMUNITY
End: 2022-06-02

## 2020-10-12 RX ORDER — CYCLOBENZAPRINE HCL 10 MG
20 TABLET ORAL 3 TIMES DAILY PRN
Status: ON HOLD | COMMUNITY
End: 2020-10-14

## 2020-10-12 RX ORDER — ACETAMINOPHEN 325 MG/1
650 TABLET ORAL EVERY 4 HOURS PRN
COMMUNITY

## 2020-10-12 RX ORDER — IBUPROFEN 800 MG/1
400 TABLET ORAL 3 TIMES DAILY PRN
Status: DISCONTINUED | OUTPATIENT
Start: 2020-10-12 | End: 2020-10-14 | Stop reason: HOSPADM

## 2020-10-12 RX ADMIN — IOHEXOL 50 ML: 350 INJECTION, SOLUTION INTRAVENOUS at 20:25

## 2020-10-12 ASSESSMENT — LIFESTYLE VARIABLES
DO YOU DRINK ALCOHOL: NO
CONSUMPTION TOTAL: NEGATIVE
ALCOHOL_USE: NO
EVER FELT BAD OR GUILTY ABOUT YOUR DRINKING: NO
DOES PATIENT WANT TO STOP DRINKING: CANNOT ASSESS
HAVE PEOPLE ANNOYED YOU BY CRITICIZING YOUR DRINKING: NO
TOTAL SCORE: 0
AVERAGE NUMBER OF DAYS PER WEEK YOU HAVE A DRINK CONTAINING ALCOHOL: 0
TOTAL SCORE: 0
ON A TYPICAL DAY WHEN YOU DRINK ALCOHOL HOW MANY DRINKS DO YOU HAVE: 0
EVER HAD A DRINK FIRST THING IN THE MORNING TO STEADY YOUR NERVES TO GET RID OF A HANGOVER: NO
HAVE YOU EVER FELT YOU SHOULD CUT DOWN ON YOUR DRINKING: NO
HOW MANY TIMES IN THE PAST YEAR HAVE YOU HAD 5 OR MORE DRINKS IN A DAY: 0
TOTAL SCORE: 0

## 2020-10-12 ASSESSMENT — COGNITIVE AND FUNCTIONAL STATUS - GENERAL
MOBILITY SCORE: 19
DRESSING REGULAR LOWER BODY CLOTHING: A LITTLE
PERSONAL GROOMING: A LITTLE
CLIMB 3 TO 5 STEPS WITH RAILING: A LOT
SUGGESTED CMS G CODE MODIFIER MOBILITY: CK
DRESSING REGULAR UPPER BODY CLOTHING: A LITTLE
TOILETING: A LITTLE
WALKING IN HOSPITAL ROOM: A LITTLE
HELP NEEDED FOR BATHING: A LITTLE
DAILY ACTIVITIY SCORE: 19
STANDING UP FROM CHAIR USING ARMS: A LITTLE
MOVING FROM LYING ON BACK TO SITTING ON SIDE OF FLAT BED: A LITTLE
SUGGESTED CMS G CODE MODIFIER DAILY ACTIVITY: CK

## 2020-10-12 ASSESSMENT — PATIENT HEALTH QUESTIONNAIRE - PHQ9
SUM OF ALL RESPONSES TO PHQ9 QUESTIONS 1 AND 2: 0
2. FEELING DOWN, DEPRESSED, IRRITABLE, OR HOPELESS: NOT AT ALL
1. LITTLE INTEREST OR PLEASURE IN DOING THINGS: NOT AT ALL

## 2020-10-12 ASSESSMENT — PAIN DESCRIPTION - PAIN TYPE: TYPE: CHRONIC PAIN

## 2020-10-13 ENCOUNTER — APPOINTMENT (OUTPATIENT)
Dept: RADIOLOGY | Facility: MEDICAL CENTER | Age: 40
DRG: 204 | End: 2020-10-13
Attending: STUDENT IN AN ORGANIZED HEALTH CARE EDUCATION/TRAINING PROGRAM
Payer: MEDICARE

## 2020-10-13 ENCOUNTER — APPOINTMENT (OUTPATIENT)
Dept: CARDIOLOGY | Facility: MEDICAL CENTER | Age: 40
DRG: 204 | End: 2020-10-13
Attending: STUDENT IN AN ORGANIZED HEALTH CARE EDUCATION/TRAINING PROGRAM
Payer: MEDICARE

## 2020-10-13 PROBLEM — R91.1 PULMONARY NODULE: Status: ACTIVE | Noted: 2020-10-13

## 2020-10-13 PROBLEM — B18.2 CHRONIC HEPATITIS C WITHOUT HEPATIC COMA (HCC): Status: RESOLVED | Noted: 2020-01-20 | Resolved: 2020-10-13

## 2020-10-13 PROBLEM — R06.09 DYSPNEA ON EFFORT: Status: ACTIVE | Noted: 2020-10-13

## 2020-10-13 PROBLEM — R09.02 HYPOXIA: Status: ACTIVE | Noted: 2020-10-13

## 2020-10-13 LAB
ANION GAP SERPL CALC-SCNC: 9 MMOL/L (ref 7–16)
BASE EXCESS BLDV CALC-SCNC: 2 MMOL/L
BODY TEMPERATURE: ABNORMAL CENTIGRADE
BUN SERPL-MCNC: 15 MG/DL (ref 8–22)
CALCIUM SERPL-MCNC: 9.1 MG/DL (ref 8.5–10.5)
CHLORIDE SERPL-SCNC: 101 MMOL/L (ref 96–112)
CO2 SERPL-SCNC: 27 MMOL/L (ref 20–33)
CREAT SERPL-MCNC: 0.61 MG/DL (ref 0.5–1.4)
ERYTHROCYTE [DISTWIDTH] IN BLOOD BY AUTOMATED COUNT: 38.5 FL (ref 35.9–50)
GLUCOSE SERPL-MCNC: 125 MG/DL (ref 65–99)
HCO3 BLDV-SCNC: 27 MMOL/L (ref 24–28)
HCT VFR BLD AUTO: 38.6 % (ref 37–47)
HGB BLD-MCNC: 12.6 G/DL (ref 12–16)
LV EJECT FRACT  99904: 65
MCH RBC QN AUTO: 29.4 PG (ref 27–33)
MCHC RBC AUTO-ENTMCNC: 32.6 G/DL (ref 33.6–35)
MCV RBC AUTO: 90.2 FL (ref 81.4–97.8)
PCO2 BLDV: 45.8 MMHG (ref 41–51)
PH BLDV: 7.39 [PH] (ref 7.31–7.45)
PLATELET # BLD AUTO: 201 K/UL (ref 164–446)
PMV BLD AUTO: 9.6 FL (ref 9–12.9)
PO2 BLDV: 42.5 MMHG (ref 25–40)
POTASSIUM SERPL-SCNC: 4.1 MMOL/L (ref 3.6–5.5)
RBC # BLD AUTO: 4.28 M/UL (ref 4.2–5.4)
SAO2 % BLDV: 75.9 %
SODIUM SERPL-SCNC: 137 MMOL/L (ref 135–145)
WBC # BLD AUTO: 8.9 K/UL (ref 4.8–10.8)

## 2020-10-13 PROCEDURE — 80048 BASIC METABOLIC PNL TOTAL CA: CPT

## 2020-10-13 PROCEDURE — 85027 COMPLETE CBC AUTOMATED: CPT

## 2020-10-13 PROCEDURE — 700102 HCHG RX REV CODE 250 W/ 637 OVERRIDE(OP): Performed by: STUDENT IN AN ORGANIZED HEALTH CARE EDUCATION/TRAINING PROGRAM

## 2020-10-13 PROCEDURE — A9270 NON-COVERED ITEM OR SERVICE: HCPCS | Performed by: STUDENT IN AN ORGANIZED HEALTH CARE EDUCATION/TRAINING PROGRAM

## 2020-10-13 PROCEDURE — 82803 BLOOD GASES ANY COMBINATION: CPT

## 2020-10-13 PROCEDURE — 94640 AIRWAY INHALATION TREATMENT: CPT

## 2020-10-13 PROCEDURE — 36415 COLL VENOUS BLD VENIPUNCTURE: CPT

## 2020-10-13 PROCEDURE — 96372 THER/PROPH/DIAG INJ SC/IM: CPT

## 2020-10-13 PROCEDURE — 93306 TTE W/DOPPLER COMPLETE: CPT | Mod: 26 | Performed by: INTERNAL MEDICINE

## 2020-10-13 PROCEDURE — 700111 HCHG RX REV CODE 636 W/ 250 OVERRIDE (IP): Performed by: STUDENT IN AN ORGANIZED HEALTH CARE EDUCATION/TRAINING PROGRAM

## 2020-10-13 PROCEDURE — 99223 1ST HOSP IP/OBS HIGH 75: CPT | Mod: GC | Performed by: INTERNAL MEDICINE

## 2020-10-13 PROCEDURE — A9270 NON-COVERED ITEM OR SERVICE: HCPCS | Performed by: INTERNAL MEDICINE

## 2020-10-13 PROCEDURE — 700102 HCHG RX REV CODE 250 W/ 637 OVERRIDE(OP): Performed by: INTERNAL MEDICINE

## 2020-10-13 PROCEDURE — 93306 TTE W/DOPPLER COMPLETE: CPT

## 2020-10-13 PROCEDURE — 93970 EXTREMITY STUDY: CPT

## 2020-10-13 PROCEDURE — 94760 N-INVAS EAR/PLS OXIMETRY 1: CPT

## 2020-10-13 PROCEDURE — 770020 HCHG ROOM/CARE - TELE (206)

## 2020-10-13 RX ORDER — TRAZODONE HYDROCHLORIDE 100 MG/1
100 TABLET ORAL
Status: DISCONTINUED | OUTPATIENT
Start: 2020-10-13 | End: 2020-10-14 | Stop reason: HOSPADM

## 2020-10-13 RX ORDER — ALBUTEROL SULFATE 90 UG/1
2 AEROSOL, METERED RESPIRATORY (INHALATION)
Status: DISCONTINUED | OUTPATIENT
Start: 2020-10-13 | End: 2020-10-14

## 2020-10-13 RX ADMIN — IBUPROFEN 400 MG: 800 TABLET, FILM COATED ORAL at 11:56

## 2020-10-13 RX ADMIN — TRAZODONE HYDROCHLORIDE 100 MG: 100 TABLET ORAL at 21:17

## 2020-10-13 RX ADMIN — MIRTAZAPINE 7.5 MG: 15 TABLET, FILM COATED ORAL at 00:06

## 2020-10-13 RX ADMIN — GABAPENTIN 600 MG: 300 CAPSULE ORAL at 18:39

## 2020-10-13 RX ADMIN — CYCLOBENZAPRINE 10 MG: 10 TABLET, FILM COATED ORAL at 11:52

## 2020-10-13 RX ADMIN — TRAMADOL HYDROCHLORIDE 50 MG: 50 TABLET ORAL at 11:52

## 2020-10-13 RX ADMIN — CYCLOBENZAPRINE 10 MG: 10 TABLET, FILM COATED ORAL at 21:15

## 2020-10-13 RX ADMIN — ALBUTEROL SULFATE 2 PUFF: 90 AEROSOL, METERED RESPIRATORY (INHALATION) at 14:53

## 2020-10-13 RX ADMIN — PHENYTOIN 4 MG: 125 SUSPENSION ORAL at 21:17

## 2020-10-13 RX ADMIN — ALBUTEROL SULFATE 2 PUFF: 90 AEROSOL, METERED RESPIRATORY (INHALATION) at 22:37

## 2020-10-13 RX ADMIN — MIRTAZAPINE 7.5 MG: 15 TABLET, FILM COATED ORAL at 21:16

## 2020-10-13 RX ADMIN — GABAPENTIN 600 MG: 300 CAPSULE ORAL at 21:24

## 2020-10-13 RX ADMIN — GABAPENTIN 600 MG: 300 CAPSULE ORAL at 00:07

## 2020-10-13 RX ADMIN — CYCLOBENZAPRINE 10 MG: 10 TABLET, FILM COATED ORAL at 06:12

## 2020-10-13 RX ADMIN — TRAMADOL HYDROCHLORIDE 50 MG: 50 TABLET ORAL at 06:13

## 2020-10-13 RX ADMIN — TRAMADOL HYDROCHLORIDE 50 MG: 50 TABLET ORAL at 18:39

## 2020-10-13 RX ADMIN — ALBUTEROL SULFATE 2 PUFF: 90 AEROSOL, METERED RESPIRATORY (INHALATION) at 07:57

## 2020-10-13 RX ADMIN — GABAPENTIN 600 MG: 300 CAPSULE ORAL at 09:26

## 2020-10-13 RX ADMIN — ALBUTEROL SULFATE 2 PUFF: 90 AEROSOL, METERED RESPIRATORY (INHALATION) at 18:00

## 2020-10-13 RX ADMIN — IBUPROFEN 400 MG: 800 TABLET, FILM COATED ORAL at 18:39

## 2020-10-13 RX ADMIN — CYCLOBENZAPRINE 10 MG: 10 TABLET, FILM COATED ORAL at 01:34

## 2020-10-13 RX ADMIN — ALBUTEROL SULFATE 2 PUFF: 90 AEROSOL, METERED RESPIRATORY (INHALATION) at 10:30

## 2020-10-13 RX ADMIN — GABAPENTIN 600 MG: 300 CAPSULE ORAL at 14:08

## 2020-10-13 RX ADMIN — TRAMADOL HYDROCHLORIDE 50 MG: 50 TABLET ORAL at 00:06

## 2020-10-13 RX ADMIN — OMEPRAZOLE 20 MG: 20 CAPSULE, DELAYED RELEASE ORAL at 06:13

## 2020-10-13 RX ADMIN — ENOXAPARIN SODIUM 40 MG: 40 INJECTION SUBCUTANEOUS at 06:13

## 2020-10-13 RX ADMIN — IBUPROFEN 400 MG: 800 TABLET, FILM COATED ORAL at 00:06

## 2020-10-13 ASSESSMENT — ENCOUNTER SYMPTOMS
DIZZINESS: 0
PALPITATIONS: 1
SEIZURES: 0
HEADACHES: 0
EYE PAIN: 0
SENSORY CHANGE: 0
NERVOUS/ANXIOUS: 0
WEAKNESS: 0
VOMITING: 0
ABDOMINAL PAIN: 0
WEIGHT LOSS: 0
NAUSEA: 0
HALLUCINATIONS: 0
CHILLS: 0
COUGH: 1
SPUTUM PRODUCTION: 0
FEVER: 0
BLOOD IN STOOL: 0
CONSTIPATION: 0
SORE THROAT: 0
DOUBLE VISION: 0
INSOMNIA: 1
MYALGIAS: 0
COUGH: 0
SINUS PAIN: 0
CLAUDICATION: 0
NECK PAIN: 0
FOCAL WEAKNESS: 0
SHORTNESS OF BREATH: 1
HEARTBURN: 0
HEMOPTYSIS: 0
PHOTOPHOBIA: 0
BRUISES/BLEEDS EASILY: 0
WHEEZING: 0
DIARRHEA: 0
ORTHOPNEA: 0
FALLS: 0
BACK PAIN: 1

## 2020-10-13 ASSESSMENT — COPD QUESTIONNAIRES
HAVE YOU SMOKED AT LEAST 100 CIGARETTES IN YOUR ENTIRE LIFE: YES
COPD SCREENING SCORE: 3
DURING THE PAST 4 WEEKS HOW MUCH DID YOU FEEL SHORT OF BREATH: NONE/LITTLE OF THE TIME
DO YOU EVER COUGH UP ANY MUCUS OR PHLEGM?: NO/ONLY WITH OCCASIONAL COLDS OR INFECTIONS

## 2020-10-13 ASSESSMENT — PAIN DESCRIPTION - PAIN TYPE
TYPE: CHRONIC PAIN
TYPE: SURGICAL PAIN
TYPE: ACUTE PAIN
TYPE: ACUTE PAIN

## 2020-10-13 ASSESSMENT — LIFESTYLE VARIABLES: SUBSTANCE_ABUSE: 1

## 2020-10-13 ASSESSMENT — FIBROSIS 4 INDEX: FIB4 SCORE: 0.73

## 2020-10-13 NOTE — ASSESSMENT & PLAN NOTE
Described as bipolar type in previous behavioral health notes.  Denies any current hallucinations, symptoms of depression, or sonya.  States symptoms have been controlled on home medications while at [facility].  - Continue home mirtazapine, ziprasidone as needed  - Ativan held due to concerns for cardiac symptoms, additionally patient states has not used recently, consider as needed if needed

## 2020-10-13 NOTE — SENIOR ADMIT NOTE
"[facility] MEDICINE SENIOR ADMIT NOTE:                                                           Chief Complaint: Exertional dyspnea    History of Present Illness:    Cira Ferguson is a 40 y.o. female with a past medical history significant for cervical cancer status post hysterectomy, provoked DVT and PE, Hepatitis C status post treatment, methamphetamine use, morbid obesity, and schizophrenia who presents with complaints of exertional dyspnea over the last 3 days accompanied by a dry cough without fevers or chills. She likewise denies wheezing. She has chronic orthopnea which she attributes to her weight however the main change recently has been that she has been very dyspneic on exertion. She also notes new mild bilateral peripheral edema over the past week. She has had a weight gain of about 50-60 pounds since she was admitted to [facility] in March which she attributes to having access to more food. She also quit using methamphetamine and tobacco at this time, although she was previously a heavy user of both.     She was evaluated in the emergency department with a CTA due to high pretest probability for pulmonary embolism which was negative. No other parenchymal disease. Discharge was initially planned however she desaturated to 88% upon ambulation.      Physical Exam:   Vitals:    10/12/20 2030 10/12/20 2048 10/12/20 2100 10/12/20 2130   BP: 100/57 (!) 180/93 152/71 147/80   Pulse: 90 100 90 90   Resp: 17 14 19 20   Temp:       TempSrc:       SpO2: 93% 94% 94% 95%   Weight:       Height:         Body mass index is 43.24 kg/m².  /80   Pulse 90   Temp 36.8 °C (98.2 °F) (Temporal)   Resp 20   Ht 1.803 m (5' 11\")   Wt (!) 140.6 kg (310 lb)   LMP 05/01/2015   SpO2 95%   BMI 43.24 kg/m²   O2 therapy: Pulse Oximetry: 95 %, O2 (LPM): 0, O2 Delivery Device: None - Room Air    General: Non-toxic appearing, in NAD  HEENT: normocephalic, atraumatic, mallampati score of 4  Cardiovascular: Distant heart " sounds, but regular rate and rhythm, trace lower extremity edema   Lungs: Obesity limits evaluation, however lungs are clear to auscultation bilaterally and without wheezing or crackles, no tachypnea.   Abdomen: Soft, nontender to palpation, morbidly obese  Skin: No rashes or erythema appreciated  Neurologic: AAO x4    Pertinent Labs:   Essentially unremarkable. Specifically a NT-proBNP of 36, although typically falsely decreased in obesity, largely rules out heart failure.     Pertinent Imaging:   CTA and chest x-ray unremarkable     Assessment and Plan:    #Dyspnea on exertion  #Hypoxia  -admit to telemetry for observation  -Ms. Ferguson is a 40 year old female with a history of provoked pulmonary embolism and DVT, methamphetamine use, morbid obesity, and tobacco use who presents with dyspnea on exertion without evidence of pulmonary embolism or parenchymal lung disease. She has objective hypoxia on exertion which suggests a few possibilities. Pulmonary hypertension is certainly possible given her previous VTE and risk factors for Group III pulmonary hypertension(obesity, possible sleep apnea) as well as previous methamphetamine use. This classically presents as disproportionate dyspnea and hypoxia with exertion. Obesity hypoventilation syndrome is also possible, and the day team could evaluate for this with a daytime ABG or VBG, though this is typically more hypercapnic rather than hypoxic. She does have a smoking history however is quite young for COPD to be plausible although she does have an extensive family history. An intracardiac shunt causing platypnea is also possible although relatively unlikely.   -Observe with continuous pulse oximetry on telemetry  -VBG tomorrow AM  -Echocardiogram  -Recommend outpatient PFTs to evaluate for restrictive or obstructive disease  -PRN albuterol  -Continue home medications      Stan Ovalle M.D.

## 2020-10-13 NOTE — CARE PLAN
Problem: Safety  Goal: Will remain free from injury  Outcome: PROGRESSING AS EXPECTED  Goal: Will remain free from falls  Outcome: PROGRESSING AS EXPECTED   Fall precautions in place. Call bell within reach, hourly rounding. Bed alarm on and active.   Problem: Respiratory:  Goal: Respiratory status will improve  Outcome: PROGRESSING AS EXPECTED   Titrate to room air. No c/o SOB or respiratory distress. >93% on room air. IS at bedside, educated on appropriate use. 10x/hour while awake encouraged.
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
## 2020-10-13 NOTE — PROGRESS NOTES
Ms. Ferguson has been voluntarily at [facility]. She was brought to the ER with SOB. CTA and COVID negative. When she ambulates, her oxygen sats go down to 88% on room air and her pulse went up to 130.  Dr. Ovalle, senior resident has graciously agreed to admit to the [facility] service for further work up.

## 2020-10-13 NOTE — ASSESSMENT & PLAN NOTE
Typical anginal per history.  Negative troponin, EKG without any acute ischemic changes. Likely exertional in etiology from transient desaturations, also possible reemergence of asthma as patient has h/o of asthma in childhood.  - Telemetry  - continue to monitor  - if CP acutely worsens, repeat ACS rule out protocol

## 2020-10-13 NOTE — CARE PLAN
Problem: Communication  Goal: The ability to communicate needs accurately and effectively will   Discussed POC, answered all current questions   Outcome: PROGRESSING AS EXPECTED        Problem: Safety  Goal: Will remain free from injury  Discussed no self harm, and prevention of injury.   Outcome: PROGRESSING AS EXPECTED  Goal: Will remain free from falls  Fall precautions  in place. Patient educated.   Outcome: PROGRESSING AS EXPECTED       Problem: Knowledge Deficit  Goal: Knowledge of disease process/condition, treatment plan, diagnostic tests, and medications will improve  Outcome: PROGRESSING AS EXPECTED  Goal: Knowledge of the prescribed therapeutic regimen will improve  Outcome: PROGRESSING AS EXPECTED

## 2020-10-13 NOTE — ED NOTES
Road test completed.  Pt complained of feeling short of breath durring the test.  Heart rate upon completion of the test was at 130 and SPO2 was at 88%.

## 2020-10-13 NOTE — PROGRESS NOTES
2 RN skin check completed with DESMOND Angel.   Devices in place N/A.  Skin assessed under devices N/A.  Confirmed pressure ulcers found on N/A.  New potential pressure ulcers noted on N/A. Wound consult placed N/A.  The following interventions in place encouraged patient to turn/ reposition self in bed.     Bilateral ears intact appropriate for ethnicity.   Bilateral elbows dry/ intact   Under bilateral breat is pink and blanching moisture.   Under abdominal fold in pink with moisture associated irration. Inter dry provided.   Sacrum pink and blanching   Bilateral heels dry/ intact

## 2020-10-13 NOTE — ED NOTES
Pt ambulatory road test completed, pt failed, with HR in 130s and oxygen dropping down to 88% on RA. ERP notified and at bedside. Pt will be admitted and ERP discussing with hospitalist.
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
## 2020-10-13 NOTE — PROGRESS NOTES
Assumed care at 0700, bedside report received from Magdalena LOGAN. Pt is SR on the monitor. Initial assessment completed, orders reviewed, call light with reach, bed alarm on, and hourly rounding in place. POC addressed with patient, no additional questions at this time.

## 2020-10-13 NOTE — ED TRIAGE NOTES
".  Chief Complaint   Patient presents with   • Shortness of Breath   • Chest Pain     \" heavyness. \"    Pt BIB EMS from [facility].  Pt c/o left chest pressure radiating to LUE.  Onset X approximately 3 days.  Difficulty breathing onset yesterday \" after I went for a walk in the courtyard, I could not catch my breath. \"  No N/V.  No fever/chills.  Pt received  mg PO and Nitro 0.4 SL by EMS PTA.     Pt denies any recent travel, denies exposure to COVID positive individuals.  Mask in place.     "

## 2020-10-13 NOTE — ED NOTES
Magdalena LOGAN from T 8 at bedside [facility], pt with belongings to go upstairs. Pt is placed on tele monitor and room air. Report already given.

## 2020-10-13 NOTE — ASSESSMENT & PLAN NOTE
Acute in onset. Hx of provoked DVT (following surgery per ED visit 11/20/12), heavy tobacco use (2 pack/day for 20yrs) and meth use (quit both tobacco and meth as of March 2020). Patient reports no significant dyspnea on exertion prior to 2 days ago, states was able to walk miles without significant dyspnea previously. CT PE showing no pulmonary embolism or acute abnormalities of the chest. Failed ambulatory test, heart rate increased to 130s, saturations dropped to 88% on RA. Bicarb is elevated at 28 indicating possible OHS. BNP and troponin negative (36, 6 respectively). Unlikely congestive heart failure in the setting of negative BNP, although BNP known to be falsely low in obesity.  -Telemetry  -Echo returned WNL, however right side of heart not well visualized  -PFTs (out-pt) for parenchymal etiologies, eg early onset obstructive versus restrictive pattern  -RT therapy with prn albuterol  -consider duoneb treatment of SOB worsens and albuterol not enough  -incentive spirometer

## 2020-10-13 NOTE — PROGRESS NOTES
Received report from DESMOND warren. Pt arrived to unit via [facility] at 2350. Pt oriented to room, unit, and plan of care. Tele-monitor placed and monitor room notified patient SR [id number]. All questions answered at this time. Call light within reach; fall precautions in place.

## 2020-10-13 NOTE — ASSESSMENT & PLAN NOTE
7 mm subpleural right posterior costophrenic angle nodule, noted to be stable cystically benign.  Follow-up recommended for CT at 6-12 months, then CT at 18-24 months as high risk in the setting of previous tobacco use.  - Follow-up outpatient CT at 6 to 12 months

## 2020-10-13 NOTE — ED PROVIDER NOTES
"ED Provider Note    CHIEF COMPLAINT  Chief Complaint   Patient presents with   • Shortness of Breath   • Chest Pain     \" heavyness. \"        HPI  Cira Ferguson is a 40 y.o. female who presents to the emergency department planing of chest heaviness and shortness of breath.  She states she is a 2-day history of chest heaviness.  In addition, she states that she has slight cough nonproductive.  Chest heaviness is constant in nature no eliciting alleviating factors.  Denies fever, shakes, chills, sweats, nausea, vomiting.  She is at [facility] currently for voluntarily admission for psychiatric issues.  The patient does have a significant history in the past of pulmonary embolism, she does not have hypertension, diabetes, she does have a smoking history for approximately 20 years, no significant family history of cardiac disease, does not take aspirin, no swelling of her lower extremities.  REVIEW OF SYSTEMS  Positives as above. Pertinent negatives include fever, shakes, chills, sweats, nausea, vomiting all other review of systems are negative    PAST MEDICAL HISTORY  Past Medical History:   Diagnosis Date   • Bipolar disorder (HCC)    • Cervical cancer (HCC)    • DVT (deep venous thrombosis) (HCC) 2013    leg January 2013   • Fall 2010   • Hepatitis C    • Infectious disease     Hep C   • IV drug abuse (HCC)     Quit 2012 per pt   • PE (pulmonary thromboembolism) (HCC) 2013   • Psychiatric disorder     bipolar   • Schizoaffective disorder (HCC)    • Schizophrenia (HCC)        FAMILY HISTORY  Noncontributory    SOCIAL HISTORY  Social History     Socioeconomic History   • Marital status:      Spouse name: Not on file   • Number of children: Not on file   • Years of education: Not on file   • Highest education level: Not on file   Occupational History   • Not on file   Social Needs   • Financial resource strain: Not on file   • Food insecurity     Worry: Not on file     Inability: Not on file   • " Transportation needs     Medical: Not on file     Non-medical: Not on file   Tobacco Use   • Smoking status: Former Smoker     Packs/day: 0.50     Years: 15.00     Pack years: 7.50     Types: Cigarettes   • Smokeless tobacco: Never Used   Substance and Sexual Activity   • Alcohol use: No     Comment: X 13 years   • Drug use: Not Currently     Comment: last use March 17, 2020   • Sexual activity: Yes     Partners: Male   Lifestyle   • Physical activity     Days per week: Not on file     Minutes per session: Not on file   • Stress: Not on file   Relationships   • Social connections     Talks on phone: Not on file     Gets together: Not on file     Attends [employer] service: Not on file     Active member of club or organization: Not on file     Attends meetings of clubs or organizations: Not on file     Relationship status: Not on file   • Intimate partner violence     Fear of current or ex partner: Not on file     Emotionally abused: Not on file     Physically abused: Not on file     Forced sexual activity: Not on file   Other Topics Concern   • Not on file   Social History Narrative   • Not on file       SURGICAL HISTORY  Past Surgical History:   Procedure Laterality Date   • PB LAMINOTOMY,LUMBAR DISK,1 INTRSP Left 1/23/2020    Procedure: LAMINECTOMY, SPINE, LUMBAR, WITH DISCECTOMY  L3-L4;  Surgeon: Tae Garcia M.D.;  Location: SURGERY [facility];  Service: Neurosurgery   • VAGINAL HYSTERECTOMY SCOPE TOTAL  10/23/2014    Performed by Levar Trevino M.D. at SURGERY SAME DAY [facility] ORS   • SALPINGECTOMY  10/23/2014    Performed by Levar Trevino M.D. at SURGERY SAME DAY [facility] ORS   • ANTERIOR AND POSTERIOR REPAIR  10/23/2014    Performed by Levar Trevino M.D. at SURGERY SAME DAY [facility] ORS   • ENTEROCELE REPAIR  10/23/2014    Performed by Lev[facility] Tre[facility]ino M.D. [facility]   • VAGINAL SUSPENSION  10/23/2014    Performed by Levar Trevino M.D. at SURGERY SAME DAY [facility] ORS    • BLADDER SLING FEMALE  10/23/2014    Performed by Levar Trevino M.D. at SURGERY SAME DAY [facility] ORS   • CYSTOSCOPY  10/23/2014    Performed by Levar Trevino M.D. at SURGERY SAME DAY [facility] ORS   • LUMBAR FUSION POSTERIOR  2012    Performed by Lauryn French M.D. at SURGERY [facility] ORS   • LUMBAR DECOMPRESSION  2012    Performed by Lauryn French M.D. at SURGERY [facility] ORS   • LUMBAR LAMINECTOMY DISKECTOMY  11/10/2011    Performed by LAURYN FRENCH at SURGERY [facility] ORS   • LUMBAR LAMINECTOMY DISKECTOMY  2011    Performed by RVAI MONTENEGRO at SURGERY [facility] ORS   • GYN SURGERY      abnormal PAP smears   • PRIMARY C SECTION      tubal ligation, , fallopian tube (one jewels)       CURRENT MEDICATIONS  Home Medications     Reviewed by Joni Weathers (Pharmacy Tech) on 10/12/20 at 2006  Med List Status: Complete   Medication Last Dose Status   acetaminophen (TYLENOL) 325 MG Tab 10/12/2020 Active   Alum & Mag Hydroxide-Simeth (ALUMINUM-MAGNESIUM-SIMETHICONE) 200-200-20 MG/5ML Suspension 10/9/2020 Active   cyclobenzaprine (FLEXERIL) 10 mg Tab 10/12/2020 Active   diphenhydrAMINE (BENADRYL) 50 MG Cap 10/12/2020 Active   gabapentin (NEURONTIN) 600 MG tablet 10/12/2020 Active   ibuprofen (MOTRIN) 400 MG Tab 10/12/2020 Active   LORazepam (ATIVAN) 1 MG Tab 10/12/2020 Active   LORazepam (ATIVAN) 2 MG/ML Solution 10/12/2020 Active   mirtazapine (REMERON) 15 MG Tab 10/11/2020 Active   omeprazole (PRILOSEC) 20 MG delayed-release capsule 10/12/2020 Active   prazosin (MINIPRESS) 2 MG Cap 10/11/2020 Active   tramadol (ULTRAM) 50 MG Tab 10/12/2020 Active   ziprasidone (GEODON) 20 MG Cap 10/11/2020 Active                ALLERGIES  Allergies   Allergen Reactions   • Contrast Media With Iodine [Iodine]    • Hydromorphone Hives and Itching   • Sulfa Drugs      Unknown rxn       PHYSICAL EXAM  VITAL SIGNS: /57   Pulse 90   Temp 36.8 °C (98.2 °F) (Temporal)   Resp 17    "Ht 1.803 m (5' 11\")   Wt (!) 140.6 kg (310 lb)   LMP 05/01/2015   SpO2 93%   BMI 43.24 kg/m²      Constitutional: Obese female no acute distress, Non-toxic appearance.   Eyes: PERRLA, EOMI, Conjunctiva normal, No discharge.   Cardiovascular: Tachycardic normal rhythm, No murmurs, No rubs, No gallops, and intact distal pulses.   Thorax & Lungs:  No respiratory distress, no rales, no rhonchi, No wheezing, No chest wall tenderness.   Abdomen: Protuberant, bowel sounds normal, Soft, No tenderness, No guarding, No rebound, No pulsatile masses.   Skin: Warm, Dry, No erythema, No rash.   Extremities: Full range of motion, no deformity, no edema.  Neurologic: Alert & oriented x 3, No focal deficits noted, acting appropriately on exam.  Psychiatric: Affect normal for clinical presentation.      RADIOLOGY/PROCEDURES  CT-CTA CHEST PULMONARY ARTERY W/ RECONS   Final Result      1.  No pulmonary embolus. No acute abnormality in the chest.   2.  A 7 mm subpleural nodule in the right posterior costophrenic angle, statistically benign. Follow-up guidelines for high and low risk patients are outlined below.      Pulmonary nodule guidelines:      Low Risk: CT at 6-12 months, then consider CT at 18-24 months      High Risk: CT at 6-12 months, then CT at 18-24 months      Low Risk - Minimal or absent history of smoking and of other known risk factors.      High Risk - History of smoking or of other known risk factors.      Note: These recommendations do not apply to lung cancer screening, patients with immunosuppression, or patients with known primary cancer.      Fleischner Society 2017 Guidelines for Management of Incidentally Detected Pulmonary Nodules in Adults               DX-CHEST-PORTABLE (1 VIEW)   Final Result      No acute cardiopulmonary findings.        Results for orders placed or performed during the hospital encounter of 10/12/20   CBC w/ Differential   Result Value Ref Range    WBC 10.6 4.8 - 10.8 K/uL    RBC 4.55 " 4.20 - 5.40 M/uL    Hemoglobin 13.2 12.0 - 16.0 g/dL    Hematocrit 40.2 37.0 - 47.0 %    MCV 88.4 81.4 - 97.8 fL    MCH 29.0 27.0 - 33.0 pg    MCHC 32.8 (L) 33.6 - 35.0 g/dL    RDW 38.3 35.9 - 50.0 fL    Platelet Count 239 164 - 446 K/uL    MPV 9.9 9.0 - 12.9 fL    Neutrophils-Polys 56.10 44.00 - 72.00 %    Lymphocytes 34.80 22.00 - 41.00 %    Monocytes 5.70 0.00 - 13.40 %    Eosinophils 2.60 0.00 - 6.90 %    Basophils 0.40 0.00 - 1.80 %    Immature Granulocytes 0.40 0.00 - 0.90 %    Nucleated RBC 0.00 /100 WBC    Neutrophils (Absolute) 5.95 2.00 - 7.15 K/uL    Lymphs (Absolute) 3.68 1.00 - 4.80 K/uL    Monos (Absolute) 0.60 0.00 - 0.85 K/uL    Eos (Absolute) 0.27 0.00 - 0.51 K/uL    Baso (Absolute) 0.04 0.00 - 0.12 K/uL    Immature Granulocytes (abs) 0.04 0.00 - 0.11 K/uL    NRBC (Absolute) 0.00 K/uL   Complete Metabolic Panel (CMP)   Result Value Ref Range    Sodium 136 135 - 145 mmol/L    Potassium 4.1 3.6 - 5.5 mmol/L    Chloride 99 96 - 112 mmol/L    Co2 28 20 - 33 mmol/L    Anion Gap 9.0 7.0 - 16.0    Glucose 100 (H) 65 - 99 mg/dL    Bun 14 8 - 22 mg/dL    Creatinine 0.81 0.50 - 1.40 mg/dL    Calcium 9.8 8.5 - 10.5 mg/dL    AST(SGOT) 16 12 - 45 U/L    ALT(SGPT) 14 2 - 50 U/L    Alkaline Phosphatase 58 30 - 99 U/L    Total Bilirubin <0.2 0.1 - 1.5 mg/dL    Albumin 4.0 3.2 - 4.9 g/dL    Total Protein 7.2 6.0 - 8.2 g/dL    Globulin 3.2 1.9 - 3.5 g/dL    A-G Ratio 1.3 g/dL   proBrain Natriuretic Peptide, NT   Result Value Ref Range    NT-proBNP 36 0 - 125 pg/mL   Troponin STAT   Result Value Ref Range    Troponin T 6 6 - 19 ng/L   COVID/SARS CoV-2 PCR    Specimen: Nasopharyngeal; Respirate   Result Value Ref Range    COVID Order Status Received    SARS-CoV-2, PCR (In-House)   Result Value Ref Range    SARS-CoV-2 Source NP Swab     SARS-CoV-2 by PCR NotDetected    ESTIMATED GFR   Result Value Ref Range    GFR If African American >60 >60 mL/min/1.73 m 2    GFR If Non African American >60 >60 mL/min/1.73 m 2   Blood  Culture,Hold   Result Value Ref Range    Blood Culture Hold Collected    EKG (Now)   Result Value Ref Range    Report       [facility] Emergency Dept.    Test Date:  2020-10-12  Pt Name:    EBONY TORRES                  Department: ER  MRN:        [id number]                      Room:       RD 04  Gender:     Female                       Technician: [id number]  :        [id number]                   Requested By:ER TRIAGE PROTOCOL  Order #:    [id number]                    Reading MD: LEANDRO BUTTERFIELD DO    Measurements  Intervals                                Axis  Rate:       95                           P:          54  VA:         168                          QRS:        66  QRSD:       84                           T:          35  QT:         352  QTc:        443    Interpretive Statements  SINUS RHYTHM  Compared to ECG 2020 08:43:32  No significant changes  Electronically Signed On 10- 18:16:00 PDT by LEANDRO BUTTERFIELD DO           COURSE & MEDICAL DECISION MAKING  Pertinent Labs & Imaging studies reviewed. (See chart for details)  This is a pleasant 40-year-old female presents with shortness of breath and hypoxia.  She is tender exertional dyspnea.  She does have a history of methamphetamine abuse, smoking tobacco products and DVT.  X-ray shows no evidence of pneumonia or abnormality, EKG shows no ST elevation or depression, and she has a normal EKG and negative troponin.  I believe she experiencing acute coronary syndrome.  She does have history of pulmonary embolism therefore CTA pulmonary angiogram was completed was negative for pulmonary embolism, infiltrate or significant abnormality.  The patient does not have evidence of congestive heart failure my evaluation there is no fluid in her lungs for CT scan as well as x-ray.  I did reevaluate the patient we ambulated through the department the lap, she came back in her saturation ox was 88 percentile and heart rate 130 bpm  and she was exquisitely out of breath.  I cannot completely exclude lung condition such as COPD as she is a smoker but I do believe the patient require further observation hospitalization to help ascertain the etiology of her condition.  I discussed the patient with Dr. Anne who graciously excepts hospitalization.      FINAL IMPRESSION  Hypoxia  Tachycardia  Exertional dyspnea         Electronically signed by: Terry Ash D.O., 10/12/2020 6:16 PM

## 2020-10-13 NOTE — DIETARY
NUTRITION SERVICES: BMI - Pt with BMI >40 (=Body mass index is 53.53 kg/m².), morbid obesity. Weight loss counseling not appropriate in acute care setting. RECOMMEND - Referral to outpatient nutrition services for weight management after D/C.

## 2020-10-13 NOTE — ASSESSMENT & PLAN NOTE
History of provoked DVT, following neurosurgery.  Noted in chart of the right lower extremity, popliteal, 11/20/2013 ED visit note. Not on anticoagulation.  -DVT US of b/l LE ordered

## 2020-10-13 NOTE — ED NOTES
Med rec complete per MAR from transferring facility. Unable to review allergies. No antibiotic use noted in past 14 days on MAR.

## 2020-10-13 NOTE — ASSESSMENT & PLAN NOTE
Long history of tobacco use, states smoked 2 packs daily since he was 17, quit March 2020.  States hasn't  smoked since.

## 2020-10-14 ENCOUNTER — PATIENT OUTREACH (OUTPATIENT)
Dept: HEALTH INFORMATION MANAGEMENT | Facility: OTHER | Age: 40
End: 2020-10-14

## 2020-10-14 VITALS
RESPIRATION RATE: 19 BRPM | TEMPERATURE: 97.5 F | SYSTOLIC BLOOD PRESSURE: 130 MMHG | HEIGHT: 71 IN | HEART RATE: 92 BPM | WEIGHT: 293 LBS | BODY MASS INDEX: 41.02 KG/M2 | OXYGEN SATURATION: 92 % | DIASTOLIC BLOOD PRESSURE: 75 MMHG

## 2020-10-14 LAB
ANION GAP SERPL CALC-SCNC: 10 MMOL/L (ref 7–16)
BUN SERPL-MCNC: 16 MG/DL (ref 8–22)
CALCIUM SERPL-MCNC: 9 MG/DL (ref 8.5–10.5)
CHLORIDE SERPL-SCNC: 101 MMOL/L (ref 96–112)
CO2 SERPL-SCNC: 27 MMOL/L (ref 20–33)
CREAT SERPL-MCNC: 0.72 MG/DL (ref 0.5–1.4)
ERYTHROCYTE [DISTWIDTH] IN BLOOD BY AUTOMATED COUNT: 38.5 FL (ref 35.9–50)
GLUCOSE SERPL-MCNC: 132 MG/DL (ref 65–99)
HCT VFR BLD AUTO: 39 % (ref 37–47)
HGB BLD-MCNC: 12.7 G/DL (ref 12–16)
MCH RBC QN AUTO: 29.3 PG (ref 27–33)
MCHC RBC AUTO-ENTMCNC: 32.6 G/DL (ref 33.6–35)
MCV RBC AUTO: 90.1 FL (ref 81.4–97.8)
PLATELET # BLD AUTO: 198 K/UL (ref 164–446)
PMV BLD AUTO: 9.8 FL (ref 9–12.9)
POTASSIUM SERPL-SCNC: 4.1 MMOL/L (ref 3.6–5.5)
RBC # BLD AUTO: 4.33 M/UL (ref 4.2–5.4)
SODIUM SERPL-SCNC: 138 MMOL/L (ref 135–145)
WBC # BLD AUTO: 7.9 K/UL (ref 4.8–10.8)

## 2020-10-14 PROCEDURE — A9270 NON-COVERED ITEM OR SERVICE: HCPCS | Performed by: STUDENT IN AN ORGANIZED HEALTH CARE EDUCATION/TRAINING PROGRAM

## 2020-10-14 PROCEDURE — 85027 COMPLETE CBC AUTOMATED: CPT

## 2020-10-14 PROCEDURE — 700102 HCHG RX REV CODE 250 W/ 637 OVERRIDE(OP): Performed by: INTERNAL MEDICINE

## 2020-10-14 PROCEDURE — 99238 HOSP IP/OBS DSCHRG MGMT 30/<: CPT | Mod: GC | Performed by: INTERNAL MEDICINE

## 2020-10-14 PROCEDURE — 700102 HCHG RX REV CODE 250 W/ 637 OVERRIDE(OP): Performed by: STUDENT IN AN ORGANIZED HEALTH CARE EDUCATION/TRAINING PROGRAM

## 2020-10-14 PROCEDURE — A9270 NON-COVERED ITEM OR SERVICE: HCPCS | Performed by: INTERNAL MEDICINE

## 2020-10-14 PROCEDURE — 36415 COLL VENOUS BLD VENIPUNCTURE: CPT

## 2020-10-14 PROCEDURE — 700111 HCHG RX REV CODE 636 W/ 250 OVERRIDE (IP): Performed by: STUDENT IN AN ORGANIZED HEALTH CARE EDUCATION/TRAINING PROGRAM

## 2020-10-14 PROCEDURE — 80048 BASIC METABOLIC PNL TOTAL CA: CPT

## 2020-10-14 RX ORDER — ALBUTEROL SULFATE 90 UG/1
2 AEROSOL, METERED RESPIRATORY (INHALATION) EVERY 4 HOURS PRN
Qty: 6.7 G | Refills: 0 | Status: SHIPPED | OUTPATIENT
Start: 2020-10-14 | End: 2021-02-11

## 2020-10-14 RX ORDER — CYCLOBENZAPRINE HCL 10 MG
10 TABLET ORAL 3 TIMES DAILY
Qty: 30 TAB | Refills: 0 | Status: SHIPPED | OUTPATIENT
Start: 2020-10-14 | End: 2021-03-24 | Stop reason: SDUPTHER

## 2020-10-14 RX ORDER — ALBUTEROL SULFATE 90 UG/1
2 AEROSOL, METERED RESPIRATORY (INHALATION) EVERY 4 HOURS PRN
Status: DISCONTINUED | OUTPATIENT
Start: 2020-10-14 | End: 2020-10-14 | Stop reason: HOSPADM

## 2020-10-14 RX ORDER — IBUPROFEN 400 MG/1
400 TABLET ORAL 3 TIMES DAILY PRN
Qty: 30 TAB | Refills: 0 | Status: SHIPPED | OUTPATIENT
Start: 2020-10-14 | End: 2020-11-13

## 2020-10-14 RX ORDER — GABAPENTIN 300 MG/1
600 CAPSULE ORAL 3 TIMES DAILY
Qty: 180 CAP | Refills: 0 | Status: SHIPPED | OUTPATIENT
Start: 2020-10-14 | End: 2021-02-11

## 2020-10-14 RX ADMIN — OMEPRAZOLE 20 MG: 20 CAPSULE, DELAYED RELEASE ORAL at 05:40

## 2020-10-14 RX ADMIN — GABAPENTIN 600 MG: 300 CAPSULE ORAL at 12:19

## 2020-10-14 RX ADMIN — GABAPENTIN 600 MG: 300 CAPSULE ORAL at 08:16

## 2020-10-14 RX ADMIN — CYCLOBENZAPRINE 10 MG: 10 TABLET, FILM COATED ORAL at 12:19

## 2020-10-14 RX ADMIN — ENOXAPARIN SODIUM 40 MG: 40 INJECTION SUBCUTANEOUS at 05:40

## 2020-10-14 RX ADMIN — TRAMADOL HYDROCHLORIDE 50 MG: 50 TABLET ORAL at 12:19

## 2020-10-14 RX ADMIN — CYCLOBENZAPRINE 10 MG: 10 TABLET, FILM COATED ORAL at 05:40

## 2020-10-14 RX ADMIN — TRAMADOL HYDROCHLORIDE 50 MG: 50 TABLET ORAL at 05:40

## 2020-10-14 RX ADMIN — IBUPROFEN 400 MG: 800 TABLET, FILM COATED ORAL at 08:38

## 2020-10-14 ASSESSMENT — PAIN DESCRIPTION - PAIN TYPE
TYPE: CHRONIC PAIN

## 2020-10-14 NOTE — DISCHARGE PLANNING
"Hospital Care Management Discharge Planning       Anticipated Discharge Disposition:   · Home     Action:   · Discussed patient this morning with [facility] resident. Patient expected to discharge today with no needs from CM team.   · Leobardo from [facility] ([phone]) called and reported that the patient was at their facility voluntarily prior to admission. Leobardo plans to schedule MTM transport for the patient and schedule her a ride back to [facility] (Voluntary) for today. Leobardo asks that we avoid discharging the patient until he arranges transportation.      Barriers to Discharge:   · MTM transportation back to [facility]     Plan:   · Patient to discharge back to [facility] voluntarily.    · Hospital Care Management Team to continue to provide support services and assistance with discharge planning as needed.       Current Expected Day of Discharge: 10/14/2020      Thank you for allowing me the pleasure of participating in this patient's care coordination and discharge planning.       For further assistance please contact the assigned RN Case Manager or  at the extension listed under \"Treatment Teams\" or signed into \"[employer]\".      "
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
## 2020-10-14 NOTE — DISCHARGE SUMMARY
"Discharge Summary    Date of Admission: 10/12/2020  Date of Discharge: No discharge date for patient encounter.  Discharging Attending: Manuel Galloway M.D.   Discharging Senior Resident: Dr. Chung  Discharging Intern: Dr. Ayala    CHIEF COMPLAINT ON ADMISSION  Chief Complaint   Patient presents with   • Shortness of Breath   • Chest Pain     \" heavyness. \"        Reason for Admission  Acute onset shortness of breath with exertion    Admission Date  10/12/2020    CODE STATUS  Full Code    HPI & HOSPITAL COURSE  Cira Ferguson is a 40 y.o. female with PMH of tobacco and methamphetamine use, right lower extremity DVT, cervical cancer s/p hysterectomy with b/l salpingo-oopherectomy, obesity, and schizoaffective disorder who presented to ED from [facility] on 10/12 for 2 day history of new onset dyspnea with exertion and associated chest pain/heaviness that radiated to left arm that was alleviated with SL nitroglycerin and tramadol. States that she would walk from room to nursing station and would become short of breath to point she would need to rest for 10-15 minutes before she felt back to baseline.    EMS gave patient 325 mg of aspirin and sublingual nitro 0.4 mg. In ED, vitals within normal limits, chemistry panel notable for HCO3 of 28, CBC within normal limits, COVID negative, BNP 36, and Trop T 6. EKG demonstrated normal sinus rhythm. CXR displayed no acute cardiopulmonary findings. CT PE was negative for pulmonary embolism, but noted a 7 mm subpleural right posterior costophrenic angle nodule. Patient was admitted for further work up.    On floor, patient was given albuterol rescue inhaler PRN, and placed on oxygen for SpO2 <88%. Echo was performed and demonstrated mild concentric left ventricular hypertrophy, a LVEF of 65%, grossly normal RA/RV size and function, and trace tricuspid valve regurgitation. Bilateral DVT US of lower extremities demonstrated no evidence of superficial or deep venous " thrombosis. Patient had ambulatory test where we walked her approximately 200-300 ft, patient maintained SpO2 above 90%, and had mild tachycardia to 110s when walking.    Therefore, she is discharged in good and stable condition to home with close outpatient follow-up.    The patient met 2-midnight criteria for an inpatient stay at the time of discharge.    PHYSICAL EXAM ON DISCHARGE  Temp:  [36.1 °C (96.9 °F)-36.7 °C (98 °F)] 36.4 °C (97.5 °F)  Pulse:  [] 92  Resp:  [16-19] 19  BP: (107-148)/(69-94) 130/75  SpO2:  [90 %-95 %] 92 %    Physical Exam  Constitutional:       General: She is not in acute distress.     Appearance: She is obese. She is not ill-appearing or diaphoretic.   HENT:      Head: Normocephalic and atraumatic.      Nose: Nose normal.      Mouth/Throat:      Mouth: Mucous membranes are moist.      Pharynx: Oropharynx is clear.   Eyes:      General: No scleral icterus.     Extraocular Movements: Extraocular movements intact.      Conjunctiva/sclera: Conjunctivae normal.      Pupils: Pupils are equal, round, and reactive to light.   Cardiovascular:      Rate and Rhythm: Regular rhythm. Tachycardia present.      Pulses: Normal pulses.      Heart sounds: Normal heart sounds. No murmur. No friction rub. No gallop.    Pulmonary:      Effort: Pulmonary effort is normal. No respiratory distress.      Breath sounds: Normal breath sounds. No stridor. No wheezing, rhonchi or rales.   Abdominal:      General: Bowel sounds are normal. There is no distension.      Palpations: Abdomen is soft.      Tenderness: There is no guarding or rebound.   Musculoskeletal: Normal range of motion.      Right lower leg: Edema present.      Left lower leg: Edema present.   Skin:     General: Skin is warm and dry.      Capillary Refill: Capillary refill takes less than 2 seconds.   Neurological:      General: No focal deficit present.      Mental Status: She is alert and oriented to person, place, and time. Mental status is  at baseline.      Cranial Nerves: No cranial nerve deficit.      Sensory: No sensory deficit.      Motor: No weakness.      Gait: Gait normal.   Psychiatric:         Mood and Affect: Mood normal.         Behavior: Behavior normal.         Thought Content: Thought content normal.         Judgment: Judgment normal.         Discharge Date  10/14/2020    FOLLOW UP ITEMS POST DISCHARGE  Follow up with new primary care physician, Dr. Bre Ferro    DISCHARGE DIAGNOSES  Principal Problem:    Dyspnea on effort POA: Unknown  Active Problems:    Back pain POA: Yes    History of DVT (deep vein thrombosis) POA: Yes    Chest pain POA: Unknown    Tobacco dependence POA: Yes    Hypoxia POA: Unknown    Pulmonary nodule POA: Unknown  Resolved Problems:    Chronic hepatitis C without hepatic coma (HCC) POA: Yes      FOLLOW UP  No future appointments.  Bre Ferro M.D.  [address] [facility] [address]  [phone]    Go on 10/16/2020  Please check in at 1:40pm for a 2:10pm appointment to establish with primary care at [facility].       MEDICATIONS ON DISCHARGE     Medication List      START taking these medications      Instructions   albuterol 108 (90 Base) MCG/ACT Aers inhalation aerosol   Inhale 2 Puffs by mouth every four hours as needed.  Dose: 2 Puff     gabapentin 300 MG Caps  Commonly known as: NEURONTIN  Replaces: gabapentin 600 MG tablet   Take 2 Caps by mouth 3 times a day.  Dose: 600 mg        CHANGE how you take these medications      Instructions   cyclobenzaprine 10 mg Tabs  What changed:   · how much to take  · when to take this  · reasons to take this  Commonly known as: Flexeril   Take 1 Tab by mouth 3 times a day.  Dose: 10 mg     ibuprofen 400 MG Tabs  What changed: how much to take  Commonly known as: MOTRIN   Take 1 Tab by mouth 3 times a day as needed (tooth pain) for up to 30 days.  Dose: 400 mg        CONTINUE taking these medications      Instructions   acetaminophen 325 MG  Tabs  Commonly known as: TYLENOL   Take 650 mg by mouth every four hours as needed for Mild Pain.  Dose: 650 mg     Aluminum-Magnesium-Simethicone 200-200-20 MG/5ML Susp   Take 30 mL by mouth 4 times a day as needed (indigestion).  Dose: 30 mL     diphenhydrAMINE 50 MG Caps  Commonly known as: BENADRYL   Take 50 mg by mouth every 6 hours as needed.  Dose: 50 mg     * LORazepam 1 MG Tabs  Commonly known as: ATIVAN   Take 1 mg by mouth 2 Times a Day.  Dose: 1 mg     * LORazepam 2 MG/ML Soln  Commonly known as: ATIVAN   2 mg by Intramuscular route every 2 hours as needed.  Dose: 2 mg     mirtazapine 15 MG Tabs  Commonly known as: Remeron   Take 7.5 mg by mouth every evening.  Dose: 7.5 mg     omeprazole 20 MG delayed-release capsule  Commonly known as: PRILOSEC   Take 20 mg by mouth every day.  Dose: 20 mg     prazosin 2 MG Caps  Commonly known as: MINIPRESS   Take 4 mg by mouth every bedtime. Hold is diastolic bp falls below 50  Dose: 4 mg     tramadol 50 MG Tabs  Commonly known as: ULTRAM   Take 50 mg by mouth 3 times a day as needed (back pain).  Dose: 50 mg     traZODone 100 MG Tabs  Commonly known as: DESYREL   Take 100 mg by mouth every evening. Indications: Trouble Sleeping  Dose: 100 mg     ziprasidone 20 MG Caps  Commonly known as: GEODON   Take 20 mg by mouth every four hours as needed (mood instability).  Dose: 20 mg         * This list has 2 medication(s) that are the same as other medications prescribed for you. Read the directions carefully, and ask your doctor or other care provider to review them with you.            STOP taking these medications    gabapentin 600 MG tablet  Commonly known as: NEURONTIN  Replaced by: gabapentin 300 MG Caps            Allergies  Allergies   Allergen Reactions   • Contrast Media With Iodine [Iodine]    • Hydromorphone Hives and Itching   • Shellfish Allergy      Shellfish & Seafood   • Sulfa Drugs      Unknown rxn       DIET  Orders Placed This Encounter   Procedures   •  Diet Order Cardiac     Standing Status:   Standing     Number of Occurrences:   1     Order Specific Question:   Diet:     Answer:   Cardiac [6]       ACTIVITY  As tolerated.  Weight bearing as tolerated    CONSULTATIONS  None    PROCEDURES  None

## 2020-10-14 NOTE — PROGRESS NOTES
Pt verbalizes understanding of d/c instructions and when to f/u. Refuses WC and hospital escort. Mother here to , then will be brought back to [facility], not on a legal. PIV removed. Facility will provide medications.

## 2020-10-14 NOTE — CARE PLAN
Problem: Communication  Goal: The ability to communicate needs accurately and effectively will improve  Discussed POC, answered all current questions.   Outcome: PROGRESSING AS EXPECTED     Problem: Safety  Goal: Will remain free from injury  Outcome: PROGRESSING AS EXPECTED  Goal: Will remain free from falls  Outcome: PROGRESSING AS EXPECTED     Problem: Psychosocial Needs:  Goal: Level of anxiety will decrease  Discussed anxiety and self harm patient denies self harm ideals. Patient resting comfortably in bed.   Outcome: PROGRESSING AS EXPECTED

## 2020-10-14 NOTE — DISCHARGE INSTRUCTIONS
Discharge Instructions    Discharged to home by car with relative. Discharged via wheelchair, hospital escort: Yes.  Special equipment needed: Not Applicable    Be sure to schedule a follow-up appointment with your primary care doctor or any specialists as instructed.     Discharge Plan:   Diet Plan: Discussed  Activity Level: Discussed  Confirmed Follow up Appointment: Patient to Call and Schedule Appointment  Confirmed Symptoms Management: Discussed  Medication Reconciliation Updated: Yes  Influenza Vaccine Indication: Not indicated: Previously immunized this influenza season and > 8 years of age    I understand that a diet low in cholesterol, fat, and sodium is recommended for good health. Unless I have been given specific instructions below for another diet, I accept this instruction as my diet prescription.   Other diet: N/A    Special Instructions: None    · Is patient discharged on Warfarin / Coumadin?   No     Gabapentin capsules or tablets  What is this medicine?  GABAPENTIN (GA ba pen tin) is used to control seizures in certain types of epilepsy. It is also used to treat certain types of nerve pain.  This medicine may be used for other purposes; ask your health care provider or pharmacist if you have questions.  COMMON BRAND NAME(S): Active-PAC with Gabapentin, Gabarone, Neurontin  What should I tell my health care provider before I take this medicine?  They need to know if you have any of these conditions:  · history of drug abuse or alcohol abuse problem  · kidney disease  · lung or breathing disease  · suicidal thoughts, plans, or attempt; a previous suicide attempt by you or a family member  · an unusual or allergic reaction to gabapentin, other medicines, foods, dyes, or preservatives  · pregnant or trying to get pregnant  · breast-feeding  How should I use this medicine?  Take this medicine by mouth with a glass of water. Follow the directions on the prescription label. You can take it with or  without food. If it upsets your stomach, take it with food. Take your medicine at regular intervals. Do not take it more often than directed. Do not stop taking except on your doctor's advice.  If you are directed to break the 600 or 800 mg tablets in half as part of your dose, the extra half tablet should be used for the next dose. If you have not used the extra half tablet within 28 days, it should be thrown away.  A special MedGuide will be given to you by the pharmacist with each prescription and refill. Be sure to read this information carefully each time.  Talk to your pediatrician regarding the use of this medicine in children. While this drug may be prescribed for children as young as 3 years for selected conditions, precautions do apply.  Overdosage: If you think you have taken too much of this medicine contact a poison control center or emergency room at once.  NOTE: This medicine is only for you. Do not share this medicine with others.  What if I miss a dose?  If you miss a dose, take it as soon as you can. If it is almost time for your next dose, take only that dose. Do not take double or extra doses.  What may interact with this medicine?  This medicine may interact with the following medications:  · alcohol  · antihistamines for allergy, cough, and cold  · certain medicines for anxiety or sleep  · certain medicines for depression like amitriptyline, fluoxetine, sertraline  · certain medicines for seizures like phenobarbital, primidone  · certain medicines for stomach problems  · general anesthetics like halothane, isoflurane, methoxyflurane, propofol  · local anesthetics like lidocaine, pramoxine, tetracaine  · medicines that relax muscles for surgery  · narcotic medicines for pain  · phenothiazines like chlorpromazine, mesoridazine, prochlorperazine, thioridazine  This list may not describe all possible interactions. Give your health care provider a list of all the medicines, herbs, non-prescription  drugs, or dietary supplements you use. Also tell them if you smoke, drink alcohol, or use illegal drugs. Some items may interact with your medicine.  What should I watch for while using this medicine?  Visit your doctor or health care provider for regular checks on your progress. You may want to keep a record at home of how you feel your condition is responding to treatment. You may want to share this information with your doctor or health care provider at each visit. You should contact your doctor or health care provider if your seizures get worse or if you have any new types of seizures. Do not stop taking this medicine or any of your seizure medicines unless instructed by your doctor or health care provider. Stopping your medicine suddenly can increase your seizures or their severity.  This medicine may cause serious skin reactions. They can happen weeks to months after starting the medicine. Contact your health care provider right away if you notice fevers or flu-like symptoms with a rash. The rash may be red or purple and then turn into blisters or peeling of the skin. Or, you might notice a red rash with swelling of the face, lips or lymph nodes in your neck or under your arms.  Wear a medical identification bracelet or chain if you are taking this medicine for seizures, and carry a card that lists all your medications.  You may get drowsy, dizzy, or have blurred vision. Do not drive, use machinery, or do anything that needs mental alertness until you know how this medicine affects you. To reduce dizzy or fainting spells, do not sit or stand up quickly, especially if you are an older patient. Alcohol can increase drowsiness and dizziness. Avoid alcoholic drinks.  Your mouth may get dry. Chewing sugarless gum or sucking hard candy, and drinking plenty of water will help.  The use of this medicine may increase the chance of suicidal thoughts or actions. Pay special attention to how you are responding while on  this medicine. Any worsening of mood, or thoughts of suicide or dying should be reported to your health care provider right away.  Women who become pregnant while using this medicine may enroll in the [place] Antiepileptic Drug Pregnancy Registry by calling 1-556.588.3624. This registry collects information about the safety of antiepileptic drug use during pregnancy.  What side effects may I notice from receiving this medicine?  Side effects that you should report to your doctor or health care professional as soon as possible:  · allergic reactions like skin rash, itching or hives, swelling of the face, lips, or tongue  · breathing problems  · rash, fever, and swollen lymph nodes  · redness, blistering, peeling or loosening of the skin, including inside the mouth  · suicidal thoughts, mood changes  Side effects that usually do not require medical attention (report to your doctor or health care professional if they continue or are bothersome):  · dizziness  · drowsiness  · headache  · nausea, vomiting  · swelling of ankles, feet, hands  · tiredness  This list may not describe all possible side effects. Call your doctor for medical advice about side effects. You may report side effects to FDA at 8-701-DJS-2779.  Where should I keep my medicine?  Keep out of reach of children.  This medicine may cause accidental overdose and death if it taken by other adults, children, or pets. Mix any unused medicine with a substance like cat litter or coffee grounds. Then throw the medicine away in a sealed container like a sealed bag or a coffee can with a lid. Do not use the medicine after the expiration date.  Store at room temperature between 15 and 30 degrees C (59 and 86 degrees F).  NOTE: This sheet is a summary. It may not cover all possible information. If you have questions about this medicine, talk to your doctor, pharmacist, or health care provider.  © 2020 Elsevier/Gold Standard (2020-03-20 14:16:43)    Sarah  of Breath, Adult  Shortness of breath means you have trouble breathing. Shortness of breath could be a sign of a medical problem.  Follow these instructions at home:    · Watch for any changes in your symptoms.  · Do not use any products that contain nicotine or tobacco, such as cigarettes, e-cigarettes, and chewing tobacco.  · Do not smoke. Smoking can cause shortness of breath. If you need help to quit smoking, ask your doctor.  · Avoid things that can make it harder to breathe, such as:  ? Mold.  ? Dust.  ? Air pollution.  ? Chemical smells.  ? Things that can cause allergy symptoms (allergens), if you have allergies.  · Keep your living space clean. Use products that help remove mold and dust.  · Rest as needed. Slowly return to your normal activities.  · Take over-the-counter and prescription medicines only as told by your doctor. This includes oxygen therapy and inhaled medicines.  · Keep all follow-up visits as told by your doctor. This is important.  Contact a doctor if:  · Your condition does not get better as soon as expected.  · You have a hard time doing your normal activities, even after you rest.  · You have new symptoms.  Get help right away if:  · Your shortness of breath gets worse.  · You have trouble breathing when you are resting.  · You feel light-headed or you pass out (faint).  · You have a cough that is not helped by medicines.  · You cough up blood.  · You have pain with breathing.  · You have pain in your chest, arms, shoulders, or belly (abdomen).  · You have a fever.  · You cannot walk up stairs.  · You cannot exercise the way you normally do.  These symptoms may represent a serious problem that is an emergency. Do not wait to see if the symptoms will go away. Get medical help right away. Call your local emergency services (911 in the [place] Do not drive yourself to the hospital.  Summary  · Shortness of breath is when you have trouble breathing enough air. It can be a sign of a medical  problem.  · Avoid things that make it hard for you to breathe, such as smoking, pollution, mold, and dust.  · Watch for any changes in your symptoms. Contact your doctor if you do not get better or you get worse.  This information is not intended to replace advice given to you by your health care provider. Make sure you discuss any questions you have with your health care provider.  Document Released: 06/05/2009 Document Revised: 05/20/2019 Document Reviewed: 05/20/2019  Elsevier Patient Education © 2020 [employer]      Depression / Suicide Risk    As you are discharged from this [facility] facility, it is important to learn how to keep safe from harming yourself.    Recognize the warning signs:  · Abrupt changes in personality, positive or negative- including increase in energy   · Giving away possessions  · Change in eating patterns- significant weight changes-  positive or negative  · Change in sleeping patterns- unable to sleep or sleeping all the time   · Unwillingness or inability to communicate  · Depression  · Unusual sadness, discouragement and loneliness  · Talk of wanting to die  · Neglect of personal appearance   · Rebelliousness- reckless behavior  · Withdrawal from people/activities they love  · Confusion- inability to concentrate     If you or a loved one observes any of these behaviors or has concerns about self-harm, here's what you can do:  · Talk about it- your feelings and reasons for harming yourself  · Remove any means that you might use to hurt yourself (examples: pills, rope, extension cords, firearm)  · Get professional help from the community (Mental Health, Substance Abuse, psychological counseling)  · Do not be alone:Call your Safe Contact- someone whom you trust who will be there for you.  · Call your local CRISIS HOTLINE 989-0452 or 119-159-7998  · Call your local Children's Mobile Crisis Response Team [place] (877) 860-8350 or www.[employer]  · Call the toll free  National Suicide Prevention Hotlines   · National Suicide Prevention Lifeline 855-761-GFBL (8775)  · National Hope Line Network 800-SUICIDE (722-3806)

## 2020-10-14 NOTE — CARE PLAN
Problem: Communication  Goal: The ability to communicate needs accurately and effectively will improve  Outcome: MET     Problem: Safety  Goal: Will remain free from injury  Outcome: MET  Goal: Will remain free from falls  Outcome: MET     Problem: Infection  Goal: Will remain free from infection  Outcome: MET     Problem: Venous Thromboembolism (VTW)/Deep Vein Thrombosis (DVT) Prevention:  Goal: Patient will participate in Venous Thrombosis (VTE)/Deep Vein Thrombosis (DVT)Prevention Measures  Outcome: MET     Problem: Bowel/Gastric:  Goal: Normal bowel function is maintained or improved  Outcome: MET  Goal: Will not experience complications related to bowel motility  Outcome: MET     Problem: Knowledge Deficit  Goal: Knowledge of disease process/condition, treatment plan, diagnostic tests, and medications will improve  Outcome: MET  Goal: Knowledge of the prescribed therapeutic regimen will improve  Outcome: MET     Problem: Discharge Barriers/Planning  Goal: Patient's continuum of care needs will be met  Outcome: MET     Problem: Pain Management  Goal: Pain level will decrease to patient's comfort goal  Outcome: MET     Problem: Respiratory:  Goal: Respiratory status will improve  Outcome: MET     Problem: Urinary Elimination:  Goal: Ability to reestablish a normal urinary elimination pattern will improve  Outcome: MET     Problem: Psychosocial Needs:  Goal: Level of anxiety will decrease  Outcome: MET

## 2020-12-09 ENCOUNTER — APPOINTMENT (OUTPATIENT)
Dept: RADIOLOGY | Facility: MEDICAL CENTER | Age: 40
End: 2020-12-09
Attending: EMERGENCY MEDICINE
Payer: MEDICAID

## 2020-12-09 ENCOUNTER — HOSPITAL ENCOUNTER (EMERGENCY)
Facility: MEDICAL CENTER | Age: 40
End: 2020-12-09
Attending: EMERGENCY MEDICINE | Admitting: EMERGENCY MEDICINE
Payer: MEDICAID

## 2020-12-09 VITALS
BODY MASS INDEX: 39.68 KG/M2 | SYSTOLIC BLOOD PRESSURE: 110 MMHG | WEIGHT: 293 LBS | TEMPERATURE: 98 F | HEART RATE: 88 BPM | HEIGHT: 72 IN | DIASTOLIC BLOOD PRESSURE: 64 MMHG | RESPIRATION RATE: 16 BRPM | OXYGEN SATURATION: 94 %

## 2020-12-09 DIAGNOSIS — M47.26 OSTEOARTHRITIS OF SPINE WITH RADICULOPATHY, LUMBAR REGION: ICD-10-CM

## 2020-12-09 LAB
APPEARANCE UR: CLEAR
BILIRUB UR QL STRIP.AUTO: NEGATIVE
COLOR UR: YELLOW
GLUCOSE UR STRIP.AUTO-MCNC: NEGATIVE MG/DL
KETONES UR STRIP.AUTO-MCNC: NEGATIVE MG/DL
LEUKOCYTE ESTERASE UR QL STRIP.AUTO: NEGATIVE
MICRO URNS: NORMAL
NITRITE UR QL STRIP.AUTO: NEGATIVE
PH UR STRIP.AUTO: 5.5 [PH] (ref 5–8)
PROT UR QL STRIP: NEGATIVE MG/DL
RBC UR QL AUTO: NEGATIVE
SP GR UR STRIP.AUTO: 1.01
UROBILINOGEN UR STRIP.AUTO-MCNC: 0.2 MG/DL

## 2020-12-09 PROCEDURE — 700102 HCHG RX REV CODE 250 W/ 637 OVERRIDE(OP): Performed by: EMERGENCY MEDICINE

## 2020-12-09 PROCEDURE — 81003 URINALYSIS AUTO W/O SCOPE: CPT

## 2020-12-09 PROCEDURE — 99283 EMERGENCY DEPT VISIT LOW MDM: CPT | Mod: EDC

## 2020-12-09 PROCEDURE — A9270 NON-COVERED ITEM OR SERVICE: HCPCS | Performed by: EMERGENCY MEDICINE

## 2020-12-09 PROCEDURE — 72148 MRI LUMBAR SPINE W/O DYE: CPT

## 2020-12-09 RX ORDER — HYDROCHLOROTHIAZIDE 12.5 MG/1
12.5 CAPSULE, GELATIN COATED ORAL DAILY
COMMUNITY
End: 2021-02-11

## 2020-12-09 RX ORDER — IBUPROFEN 600 MG/1
600 TABLET ORAL ONCE
Status: COMPLETED | OUTPATIENT
Start: 2020-12-09 | End: 2020-12-09

## 2020-12-09 RX ADMIN — IBUPROFEN 600 MG: 600 TABLET, FILM COATED ORAL at 15:45

## 2020-12-09 ASSESSMENT — FIBROSIS 4 INDEX: FIB4 SCORE: 0.89

## 2020-12-09 NOTE — ED TRIAGE NOTES
"Pt ambulated  to triage with   Chief Complaint   Patient presents with   • T-5000 FALL     yester[place], [place]; pt reports that she h/o multiple back sx and today she is having trouble starting a stream of urine and fells like she has to have a BM.  pt reports \"legs feel like jello:\" - pt was sitting in a plastic chair that broke and fell on to her buttock and reports pain to right ankle also.        Pt Informed regarding triage process and verbalized understanding to inform triage tech or RN for any changes in condition. Placed in lobby.       "

## 2020-12-09 NOTE — ED NOTES
Pt to room in . Pt states she is unable to walk long distances r/t to low back pain. CHart up for ERP.

## 2020-12-09 NOTE — ED PROVIDER NOTES
"ED Provider Note      CHIEF COMPLAINT  Chief Complaint   Patient presents with   • T-5000 FALL     yesterday, [place] is [place]; pt reports that she h/o multiple back sx and today she is having trouble starting a stream of urine and fells like she has to have a BM.  pt reports \"legs feel like jello:\" - pt was sitting in a plastic chair that broke and fell on to her buttock and reports pain to right ankle also.         HPI  Cira Ferguson is a 40 y.o. female who presents with low back pain.  Patient sat on a plastic chair.  The chair gave out from underneath her.  She fell directly onto her buttocks.  She has since developed increase in her chronic back pain.  Located over the lower legs.  She has had a hard time initiating a stream of urine.  She also feels pressure like she has to have a bowel movement but does not.  She has chronic numbness around her genitals from previous back issues.  She reports that her legs have occasionally felt like Jell-O although she is still able to ambulate.  She denies sensation loss in her lower extremity.  Denies abdominal pain, nausea vomiting.  She has not had pain when she urinates.  No flank pain.  Pain is located in her mid low back.  Throbbing nonradiating worse with movement.  She took Tylenol today without improvement.    REVIEW OF SYSTEMS  Denies any weakness in the lower extremities. No bowel or bladder incontinence or saddle anesthesia. No fevers or chills. No injection drug use or IV drug abuse. No abdominal pain, nausea or vomiting. No dysuria, hematuria or flank pain.    PAST MEDICAL HISTORY  Past Medical History:   Diagnosis Date   • Bipolar disorder (HCC)    • Cervical cancer (HCC)    • DVT (deep venous thrombosis) (HCC) 2013    leg January 2013   • Fall     2010   • Hepatitis C    • Infectious disease     Hep C   • IV drug abuse (HCC)     Quit 2012 per pt   • PE (pulmonary thromboembolism) (HCC) 2013   • Psychiatric disorder     bipolar   • Schizoaffective " disorder (HCC)    • Schizophrenia (HCC)        FAMILY HISTORY  Family History   Problem Relation Age of Onset   • Heart Disease Mother    • Diabetes Mother    • Lung Disease Mother    • Alcohol/Drug Father        SOCIAL HISTORY  Social History     Tobacco Use   • Smoking status: Former Smoker     Packs/day: 0.50     Years: 15.00     Pack years: 7.50     Types: Cigarettes   • Smokeless tobacco: Never Used   Substance Use Topics   • Alcohol use: No     Comment: X 13 years   • Drug use: Not Currently     Comment: last use March 17, 2020       SURGICAL HISTORY  Past Surgical History:   Procedure Laterality Date   • PB LAMINOTOMY,LUMBAR DISK,1 INTRSP Left 1/23/2020    Procedure: LAMINECTOMY, SPINE, LUMBAR, WITH DISCECTOMY  L3-L4;  Surgeon: Tae Garcia M.D.;  Location: SURGERY [facility];  Service: Neurosurgery   • VAGINAL HYSTERECTOMY SCOPE TOTAL  10/23/2014    Performed by Levar Trevino M.D. at SURGERY SAME DAY [facility]   • SALPINGECTOMY  10/23/2014    Performed by Levar Trevino M.D. at SURGERY SAME DAY [facility]   • ANTERIOR AND POSTERIOR REPAIR  10/23/2014    Performed by Levar Trevino M.D. at SURGERY SAME DAY [facility]   • ENTEROCELE REPAIR  10/23/2014    Performed by Levar Trevino M.D. at SURGERY SAME DAY [facility]   • VAGINAL SUSPENSION  10/23/2014    Performed by Levar Trevino M.D. at SURGERY SAME DAY [facility] ORS   • BLADDER SLING FEMALE  10/23/2014    Performed by Levar Trevino M.D. at SURGERY SAME DAY [facility]   • CYSTOSCOPY  10/23/2014    Performed by Levar Trevino M.D. at SURGERY SAME DAY [facility] ORS   • LUMBAR FUSION POSTERIOR  11/5/2012    Performed by Mateusz French M.D. at SURGERY [facility]   • LUMBAR DECOMPRESSION  11/5/2012    Performed by Mateusz French M.D. at SURGERY [facility]   • LUMBAR LAMINECTOMY DISKECTOMY  11/10/2011    Performed by MATEUSZ FRENCH at SURGERY [facility]   • LUMBAR LAMINECTOMY DISKECTOMY  5/17/2011    Performed by  RAVI MONTENEGRO at SURGERY [facility] ORS   • GYN SURGERY      abnormal PAP smears   • PRIMARY C SECTION      tubal ligation, , fallopian tube (one jewels)       CURRENT MEDICATIONS  Home Medications     Reviewed by Cindy Araiza R.N. (Registered Nurse) on 20 at 1304  Med List Status: Partial   Medication Last Dose Status   acetaminophen (TYLENOL) 325 MG Tab  Active   albuterol 108 (90 Base) MCG/ACT Aero Soln inhalation aerosol not taking Active   Alum & Mag Hydroxide-Simeth (ALUMINUM-MAGNESIUM-SIMETHICONE) 200-200-20 MG/5ML Suspension  Active   cyclobenzaprine (FLEXERIL) 10 mg Tab 2020 Active   diphenhydrAMINE (BENADRYL) 50 MG Cap  Active   gabapentin (NEURONTIN) 300 MG Cap 2020 Active   hydrochlorothiazide (MICROZIDE) 12.5 MG capsule 2020 Active   LORazepam (ATIVAN) 1 MG Tab  Active   LORazepam (ATIVAN) 2 MG/ML Solution  Active   mirtazapine (REMERON) 15 MG Tab 2020 Active   omeprazole (PRILOSEC) 20 MG delayed-release capsule 2020 Active   prazosin (MINIPRESS) 2 MG Cap  Active   tramadol (ULTRAM) 50 MG Tab prn Active   traZODone (DESYREL) 100 MG Tab 2020 Active   ziprasidone (GEODON) 20 MG Cap not taking Active                ALLERGIES  Allergies   Allergen Reactions   • Contrast Media With Iodine [Iodine]    • Shellfish Allergy      Shellfish & Seafood   • Sulfa Drugs      Unknown rxn         PHYSICAL EXAM  VITAL SIGNS: /83   Pulse 84   Temp 36.9 °C (98.4 °F) (Temporal)   Resp 15   Ht 1.829 m (6')   Wt (!) 175.1 kg (386 lb 0.4 oz)   LMP 2015   SpO2 94%   BMI 52.35 kg/m²   Constitutional: Well developed, Well nourished, No acute distress, Non-toxic appearance. Complaining of pain  Neck: Grossly normal range of motion  Cardiovascular: Normal heart rate   Thorax & Lungs: No respiratory distress  Abdomen: Bowel sounds normal, soft, non-distended, nontender, no masses.  Skin: Warm, Dry, No rash.   Back: Diffuse lumbar tenderness, no step-off or  deformity  Extremities: No clubbing, cyanosis, edema, no Homans or cords   Neurologic: Awake alert oriented.  Good strength in the lower extremities.  Normal DTRs.  Normal sensation.      RADIOLOGY/PROCEDURES  MRI lumbar spine    COURSE & MEDICAL DECISION MAKING  Patient presents with back pain.  Has questionable weakness in her extremities.  Describes bowel and bladder symptoms.  She requires MRI to rule out spinal cord impingement syndrome.  I ordered a urinalysis.  She is given ibuprofen.  She is a recovered addict of heroin and methamphetamine.  Does not request any additional analgesics.  Unfortunately she did not fit on our inpatient CT and therefore will have to wait for outpatient CT at about 5 PM today.  If her MRI is negative for acute findings she will be discharged with advice for Tylenol and ibuprofen.  If abnormal further dictation will follow.    FINAL IMPRESSION  1.  Acute low back pain        This dictation was created using voice recognition software. The accuracy of the dictation is limited to the abilities of the software.  The nursing notes were reviewed and certain aspects of this information were incorporated into this note.    Electronically signed by: Jose Gage M.D., 12/9/2020 3:20 PM

## 2020-12-10 NOTE — ED NOTES
Pt states understanding of DC instructions and f/u care. Pt assisted out by staff inWC. Pt able to amb but requested help out.

## 2020-12-10 NOTE — ED PROVIDER NOTES
ED Provider Note       Patient was signed out to me by daytime physician pending results of the MRI.  Briefly she had a ground-level fall.  She has prior history of DJD in the back.  Ultrasound here demonstrates chronic postsurgical changes and moderate canal stenosis at L3-L4 but compared to previous MRI it appears to be less severe.  The patient will be discharged home with appropriate follow-up    FINDINGS:  There is an approximately 11 mm anterolisthesis of L5 on S1. There are postsurgical changes as evidenced by posterior fusion of L5 and S1 with transpedicular screws and posterior laminectomies at L5-S1..     At the level of L5-S1, there is no central canal stenosis. There is moderate bilateral neural foraminal stenosis.     At the level of L4-5, there is disc degeneration. There is no significant osseous. There is mild left neural foraminal stenosis.     At the level of L3-4, there is disc degeneration. There is central disc extrusion. There is moderate central canal stenosis. There is severe left and moderate right lateral recess stenosis.     At the level of the L2-3, there is mild central canal stenosis. There is mild bilateral neural foraminal stenosis.     At the level of L1-2, there is no spinal or neural foraminal stenosis.     The conus terminates at the level of L1. The visualized lower thoracic spinal cord is unremarkable. There is no lumbar intradural lesion.     A gallstone is seen.     A fatty filum terminale seen.     IMPRESSION:     1.  Postsurgical and degenerative changes in the lumbar spine as described above.  2.  Moderate central canal stenosis at L3-4 likely secondary to the central disc extrusion and prominent epidural fat. There is severe left and moderate right lateral recess stenosis at this level. When compared with the previous MRI there has been mild   interval reduction in the size of the herniated disc.

## 2021-01-11 DIAGNOSIS — Z12.31 ENCOUNTER FOR SCREENING MAMMOGRAM FOR MALIGNANT NEOPLASM OF BREAST: ICD-10-CM

## 2021-01-11 DIAGNOSIS — N64.4 MASTODYNIA: ICD-10-CM

## 2021-01-19 ENCOUNTER — PHARMACY VISIT (OUTPATIENT)
Dept: PHARMACY | Facility: MEDICAL CENTER | Age: 41
End: 2021-01-19
Payer: COMMERCIAL

## 2021-01-19 PROCEDURE — RXMED WILLOW AMBULATORY MEDICATION CHARGE: Performed by: ADVANCED PRACTICE MIDWIFE

## 2021-01-20 ENCOUNTER — HOSPITAL ENCOUNTER (OUTPATIENT)
Dept: RADIOLOGY | Facility: MEDICAL CENTER | Age: 41
End: 2021-01-20
Attending: ADVANCED PRACTICE MIDWIFE
Payer: MEDICAID

## 2021-01-20 DIAGNOSIS — Z12.31 ENCOUNTER FOR SCREENING MAMMOGRAM FOR MALIGNANT NEOPLASM OF BREAST: ICD-10-CM

## 2021-01-20 DIAGNOSIS — N64.4 MASTODYNIA: ICD-10-CM

## 2021-01-20 PROCEDURE — 76642 ULTRASOUND BREAST LIMITED: CPT | Mod: LT

## 2021-01-20 PROCEDURE — G0279 TOMOSYNTHESIS, MAMMO: HCPCS

## 2021-02-02 ENCOUNTER — HOSPITAL ENCOUNTER (EMERGENCY)
Facility: MEDICAL CENTER | Age: 41
End: 2021-02-02
Attending: EMERGENCY MEDICINE
Payer: MEDICAID

## 2021-02-02 ENCOUNTER — APPOINTMENT (OUTPATIENT)
Dept: RADIOLOGY | Facility: MEDICAL CENTER | Age: 41
End: 2021-02-02
Attending: EMERGENCY MEDICINE
Payer: MEDICAID

## 2021-02-02 VITALS
OXYGEN SATURATION: 95 % | BODY MASS INDEX: 39.68 KG/M2 | RESPIRATION RATE: 18 BRPM | WEIGHT: 293 LBS | DIASTOLIC BLOOD PRESSURE: 65 MMHG | TEMPERATURE: 97.1 F | HEIGHT: 72 IN | SYSTOLIC BLOOD PRESSURE: 147 MMHG | HEART RATE: 87 BPM

## 2021-02-02 DIAGNOSIS — J06.9 VIRAL URI: ICD-10-CM

## 2021-02-02 LAB
FLUAV RNA SPEC QL NAA+PROBE: NEGATIVE
FLUBV RNA SPEC QL NAA+PROBE: NEGATIVE
RSV RNA SPEC QL NAA+PROBE: NEGATIVE
SARS-COV-2 RNA RESP QL NAA+PROBE: NOTDETECTED
SPECIMEN SOURCE: NORMAL

## 2021-02-02 PROCEDURE — 700111 HCHG RX REV CODE 636 W/ 250 OVERRIDE (IP): Performed by: EMERGENCY MEDICINE

## 2021-02-02 PROCEDURE — 700102 HCHG RX REV CODE 250 W/ 637 OVERRIDE(OP): Performed by: EMERGENCY MEDICINE

## 2021-02-02 PROCEDURE — 99284 EMERGENCY DEPT VISIT MOD MDM: CPT

## 2021-02-02 PROCEDURE — A9270 NON-COVERED ITEM OR SERVICE: HCPCS | Performed by: EMERGENCY MEDICINE

## 2021-02-02 PROCEDURE — 71045 X-RAY EXAM CHEST 1 VIEW: CPT

## 2021-02-02 PROCEDURE — 0241U HCHG SARS-COV-2 COVID-19 NFCT DS RESP RNA 4 TRGT MIC: CPT

## 2021-02-02 PROCEDURE — C9803 HOPD COVID-19 SPEC COLLECT: HCPCS | Performed by: EMERGENCY MEDICINE

## 2021-02-02 RX ORDER — IBUPROFEN 600 MG/1
600 TABLET ORAL ONCE
Status: COMPLETED | OUTPATIENT
Start: 2021-02-02 | End: 2021-02-02

## 2021-02-02 RX ORDER — ONDANSETRON 4 MG/1
4 TABLET, ORALLY DISINTEGRATING ORAL ONCE
Status: COMPLETED | OUTPATIENT
Start: 2021-02-02 | End: 2021-02-02

## 2021-02-02 RX ADMIN — ONDANSETRON 4 MG: 4 TABLET, ORALLY DISINTEGRATING ORAL at 15:59

## 2021-02-02 RX ADMIN — IBUPROFEN 600 MG: 600 TABLET, FILM COATED ORAL at 15:59

## 2021-02-02 ASSESSMENT — FIBROSIS 4 INDEX: FIB4 SCORE: 0.89

## 2021-02-02 ASSESSMENT — LIFESTYLE VARIABLES: DO YOU DRINK ALCOHOL: NO

## 2021-02-02 NOTE — ED TRIAGE NOTES
Chief Complaint   Patient presents with   • Shortness of Breath     started today   • Generalized Body Aches     since yesterday     Pt ambulatory to triage with above complaints. Pt is unsure if she has been exposed to anyone with covid. Pt denies relief in symptoms after taking tylenol and motrin. Pt also reports cough but denies fevers.

## 2021-02-02 NOTE — ED PROVIDER NOTES
ED Provider Note    CHIEF COMPLAINT  Chief Complaint   Patient presents with   • Shortness of Breath     started today   • Generalized Body Aches     since yesterday       HPI  Cira Ferguson is a 40 y.o. female who presents for evaluation of reported shortness of breath mild sore throat myalgias body aches.  The patient apparently was exposed to an individual with COVID-19.  She has a complex medical history as listed below.    REVIEW OF SYSTEMS  See HPI for further details. All other systems are negative.     PAST MEDICAL HISTORY  Past Medical History:   Diagnosis Date   • DVT (deep venous thrombosis) (HCC) 2013    leg January 2013   • PE (pulmonary thromboembolism) (HCC) 2013   • Bipolar disorder (HCC)    • Cervical cancer (HCC)    • Fall 2010   • Hepatitis C    • Infectious disease     Hep C   • IV drug abuse (HCC)     Quit 2012 per pt   • Psychiatric disorder     bipolar   • Schizoaffective disorder (HCC)    • Schizophrenia (HCC)        FAMILY HISTORY  [unfilled]    SOCIAL HISTORY  Social History     Socioeconomic History   • Marital status: Legally      Spouse name: Not on file   • Number of children: Not on file   • Years of education: Not on file   • Highest education level: Not on file   Occupational History   • Not on file   Social Needs   • Financial resource strain: Not on file   • Food insecurity     Worry: Not on file     Inability: Not on file   • Transportation needs     Medical: Not on file     Non-medical: Not on file   Tobacco Use   • Smoking status: Current Every Day Smoker     Packs/day: 0.50     Years: 15.00     Pack years: 7.50     Types: Cigarettes   • Smokeless tobacco: Never Used   Substance and Sexual Activity   • Alcohol use: No     Comment: X 13 years   • Drug use: Not Currently     Comment: last use March 17, 2020   • Sexual activity: Yes     Partners: Male   Lifestyle   • Physical activity     Days per week: Not on file     Minutes per session: Not on file   • Stress: Not  on file   Relationships   • Social connections     Talks on phone: Not on file     Gets together: Not on file     Attends Islam service: Not on file     Active member of club or organization: Not on file     Attends meetings of clubs or organizations: Not on file     Relationship status: Not on file   • Intimate partner violence     Fear of current or ex partner: Not on file     Emotionally abused: Not on file     Physically abused: Not on file     Forced sexual activity: Not on file   Other Topics Concern   • Not on file   Social History Narrative   • Not on file       SURGICAL HISTORY  Past Surgical History:   Procedure Laterality Date   • PB LAMINOTOMY,LUMBAR DISK,1 INTRSP Left 1/23/2020    Procedure: LAMINECTOMY, SPINE, LUMBAR, WITH DISCECTOMY  L3-L4;  Surgeon: Tae Garcia M.D.;  Location: SURGERY [facility];  Service: Neurosurgery   • VAGINAL HYSTERECTOMY SCOPE TOTAL  10/23/2014    Performed by Levar Trevino M.D. at SURGERY SAME DAY [facility]   • SALPINGECTOMY  10/23/2014    Performed by Levar Trevino M.D. at SURGERY SAME DAY [facility]   • ANTERIOR AND POSTERIOR REPAIR  10/23/2014    Performed by Levar Trevino M.D. at SURGERY SAME DAY [facility]   • ENTEROCELE REPAIR  10/23/2014    Performed by Levar Trevino M.D. at SURGERY SAME DAY [facility]   • VAGINAL SUSPENSION  10/23/2014    Performed by Levar Trevino M.D. at SURGERY SAME DAY [facility]   • BLADDER SLING FEMALE  10/23/2014    Performed by Levar Trevino M.D. at SURGERY SAME DAY [facility]   • CYSTOSCOPY  10/23/2014    Performed by Levar Trevino M.D. at SURGERY SAME DAY [facility]   • LUMBAR FUSION POSTERIOR  11/5/2012    Performed by Mateusz French M.D. at SURGERY [facility]   • LUMBAR DECOMPRESSION  11/5/2012    Performed by Mateusz French M.D. at SURGERY [facility]   • LUMBAR LAMINECTOMY DISKECTOMY  11/10/2011    Performed by MATEUSZ FRENCH at SURGERY [facility]   • LUMBAR LAMINECTOMY  DISKECTOMY  2011    Performed by RAVI MONTENEGRO at SURGERY [facility]   • GYN SURGERY      abnormal PAP smears   • PRIMARY C SECTION      tubal ligation, , fallopian tube (one jewels)       CURRENT MEDICATIONS  Home Medications     Reviewed by Laz Doyle R.N. (Registered Nurse) on 21 at 1318  Med List Status: Not Addressed   Medication Last Dose Status   acetaminophen (TYLENOL) 325 MG Tab  Active   albuterol 108 (90 Base) MCG/ACT Aero Soln inhalation aerosol  Active   Alum & Mag Hydroxide-Simeth (ALUMINUM-MAGNESIUM-SIMETHICONE) 200-200-20 MG/5ML Suspension  Active   cyclobenzaprine (FLEXERIL) 10 mg Tab  Active   diphenhydrAMINE (BENADRYL) 50 MG Cap  Active   gabapentin (NEURONTIN) 300 MG Cap  Active   hydrochlorothiazide (MICROZIDE) 12.5 MG capsule  Active   LORazepam (ATIVAN) 1 MG Tab  Active   LORazepam (ATIVAN) 2 MG/ML Solution  Active   mirtazapine (REMERON) 15 MG Tab  Active   omeprazole (PRILOSEC) 20 MG delayed-release capsule  Active   penicillin g benzathine (BICILLIN L-A) 9936094 UNIT/4ML Suspension injection  Active   prazosin (MINIPRESS) 2 MG Cap  Active   tramadol (ULTRAM) 50 MG Tab  Active   traZODone (DESYREL) 100 MG Tab  Active   ziprasidone (GEODON) 20 MG Cap  Active                ALLERGIES  Allergies   Allergen Reactions   • Contrast Media With Iodine [Iodine]    • Shellfish Allergy      Shellfish & Seafood   • Sulfa Drugs      Unknown rxn       PHYSICAL EXAM  VITAL SIGNS: /92   Pulse 88   Temp 36.2 °C (97.1 °F) (Temporal)   Resp 16   Ht 1.829 m (6')   Wt (!) 171 kg (376 lb 8.7 oz)   LMP 2015   SpO2 97%   BMI 51.07 kg/m²       Constitutional: Well developed, Well nourished, No acute distress, Non-toxic appearance.   HENT: Normocephalic, Atraumatic, Bilateral external ears normal, Oropharynx moist, No oral exudates, Nose normal.  Posterior pharynx is clear without exudates or abscess eyes: PERRLA, EOMI, Conjunctiva normal, No discharge.   Neck: Normal  range of motion, No tenderness, Supple, No stridor.   Lymphatic: No lymphadenopathy noted.   Cardiovascular: Normal heart rate, Normal rhythm, No murmurs, No rubs, No gallops.   Thorax & Lungs: Normal breath sounds, No respiratory distress, No wheezing, No chest tenderness.   Abdomen: Bowel sounds normal, Soft, No tenderness, No masses, No pulsatile masses.   Skin: Warm, Dry, No erythema, No rash.   Extremities: Intact distal pulses, No edema, No tenderness, No cyanosis, No clubbing.   Musculoskeletal: Good range of motion in all major joints. No tenderness to palpation or major deformities noted.   Neurologic: Alert & oriented x 3, Normal motor function, Normal sensory function, No focal deficits noted.   Psychiatric: Affect normal, Judgment normal, Mood normal.       RADIOLOGY/PROCEDURES  DX-CHEST-PORTABLE (1 VIEW)   Final Result      No acute cardiac or pulmonary abnormalities are identified.        Results for orders placed or performed during the hospital encounter of 02/02/21   COV-2, FLU A/B, AND RSV BY PCR (2-4 HOURS CEPHEID): Collect NP swab in VTM    Specimen: Respirate   Result Value Ref Range    Influenza virus A RNA Negative Negative    Influenza virus B, PCR Negative Negative    RSV, PCR Negative Negative    SARS-CoV-2 by PCR NotDetected     SARS-CoV-2 Source NP Swab          COURSE & MEDICAL DECISION MAKING  Pertinent Labs & Imaging studies reviewed. (See chart for details)  Patient presents here with concern for possible Covid.  She has no high fever hypoxia or increased work of breathing.  Chest x-ray is negative.  Because she lives in a group setting situation she met criteria for rapid Covid testing which was negative.  She likely has a mild viral URI no indication for antibiotics or further evaluation at this time    FINAL IMPRESSION  1.  Viral URI  Electronically signed by: Ángel Ramirez M.D., 2/2/2021 3:20 PM

## 2021-02-03 NOTE — ED NOTES
Pt given discharge education and verbalized understanding, has no further questions at this time. Pt is A&Ox4, steady gait, VSS on monitor. Pt aware we are waiting for [facility] for transport ETA [id number].

## 2021-02-03 NOTE — ED NOTES
Patient remains calm and in room at this time. The pt appears to be resting. Respirations even and unlabored. No distress noted, will continue to monitor.

## 2021-02-03 NOTE — DISCHARGE PLANNING
SW received call from Sue Monreal with the  Triage [phone] or [phone]  stating they are working with [facility] and want to place Pt in COVID Housing.     Pt in agreement to go to the Housing Unit   Pt signed all COVID housing paperwork and it was faxed to Darius with [facility]. He has contacted [facility] and arranged for [facility] transportation . [facility] will pick Pt up between [phone].     RN updated.

## 2021-02-11 ENCOUNTER — OFFICE VISIT (OUTPATIENT)
Dept: URGENT CARE | Facility: CLINIC | Age: 41
End: 2021-02-11
Payer: MEDICAID

## 2021-02-11 VITALS
WEIGHT: 293 LBS | HEIGHT: 72 IN | SYSTOLIC BLOOD PRESSURE: 120 MMHG | RESPIRATION RATE: 18 BRPM | DIASTOLIC BLOOD PRESSURE: 80 MMHG | OXYGEN SATURATION: 97 % | BODY MASS INDEX: 39.68 KG/M2 | HEART RATE: 98 BPM | TEMPERATURE: 97.2 F

## 2021-02-11 DIAGNOSIS — H92.01 OTALGIA, RIGHT EAR: ICD-10-CM

## 2021-02-11 DIAGNOSIS — R59.1 LYMPHADENOPATHY: ICD-10-CM

## 2021-02-11 DIAGNOSIS — J02.9 SORE THROAT: ICD-10-CM

## 2021-02-11 DIAGNOSIS — H69.91 DYSFUNCTION OF RIGHT EUSTACHIAN TUBE: ICD-10-CM

## 2021-02-11 LAB
HETEROPH AB SER QL LA: NEGATIVE
INT CON NEG: NEGATIVE
INT CON NEG: NEGATIVE
INT CON POS: POSITIVE
INT CON POS: POSITIVE
S PYO AG THROAT QL: NEGATIVE

## 2021-02-11 PROCEDURE — 86308 HETEROPHILE ANTIBODY SCREEN: CPT | Performed by: PHYSICIAN ASSISTANT

## 2021-02-11 PROCEDURE — 99214 OFFICE O/P EST MOD 30 MIN: CPT | Performed by: PHYSICIAN ASSISTANT

## 2021-02-11 PROCEDURE — 87880 STREP A ASSAY W/OPTIC: CPT | Performed by: PHYSICIAN ASSISTANT

## 2021-02-11 RX ORDER — LORATADINE 10 MG/1
10 TABLET ORAL DAILY
COMMUNITY
End: 2022-06-02

## 2021-02-11 RX ORDER — OFLOXACIN 3 MG/ML
5 SOLUTION AURICULAR (OTIC) DAILY
COMMUNITY
End: 2022-01-26

## 2021-02-11 RX ORDER — KETOROLAC TROMETHAMINE 30 MG/ML
60 INJECTION, SOLUTION INTRAMUSCULAR; INTRAVENOUS ONCE
Status: DISCONTINUED | OUTPATIENT
Start: 2021-02-11 | End: 2021-02-11

## 2021-02-11 RX ORDER — GABAPENTIN 600 MG/1
600 TABLET ORAL 3 TIMES DAILY
COMMUNITY
Start: 2021-01-15 | End: 2022-04-21 | Stop reason: SDUPTHER

## 2021-02-11 RX ORDER — METHYLPREDNISOLONE 4 MG/1
TABLET ORAL
Qty: 21 TABLET | Refills: 0 | Status: SHIPPED | OUTPATIENT
Start: 2021-02-11 | End: 2021-03-24

## 2021-02-11 RX ORDER — ERGOCALCIFEROL 1.25 MG/1
CAPSULE ORAL
COMMUNITY
Start: 2021-01-15 | End: 2021-02-11

## 2021-02-11 RX ORDER — COLISTIN SULFATE, NEOMYCIN SULFATE, THONZONIUM BROMIDE AND HYDROCORTISONE ACETATE 3; 3.3; .5; 1 MG/ML; MG/ML; MG/ML; MG/ML
SUSPENSION AURICULAR (OTIC)
COMMUNITY
Start: 2020-12-15 | End: 2021-02-11

## 2021-02-11 RX ORDER — DOXYCYCLINE HYCLATE 100 MG
100 TABLET ORAL 2 TIMES DAILY
Qty: 14 TABLET | Refills: 0 | Status: SHIPPED | OUTPATIENT
Start: 2021-02-11 | End: 2021-02-18

## 2021-02-11 RX ORDER — KETOROLAC TROMETHAMINE 30 MG/ML
30 INJECTION, SOLUTION INTRAMUSCULAR; INTRAVENOUS ONCE
Status: COMPLETED | OUTPATIENT
Start: 2021-02-11 | End: 2021-02-11

## 2021-02-11 RX ORDER — PERMETHRIN 50 MG/G
CREAM TOPICAL
COMMUNITY
Start: 2021-01-12 | End: 2021-02-11

## 2021-02-11 RX ORDER — PALIPERIDONE PALMITATE 156 MG/ML
INJECTION INTRAMUSCULAR
COMMUNITY
Start: 2020-12-23 | End: 2022-01-26

## 2021-02-11 RX ORDER — CLONIDINE HYDROCHLORIDE 0.1 MG/1
TABLET ORAL
COMMUNITY
Start: 2021-01-05 | End: 2022-01-26

## 2021-02-11 RX ORDER — IBUPROFEN 600 MG/1
600 TABLET ORAL EVERY 8 HOURS PRN
Qty: 30 TABLET | Refills: 0 | Status: SHIPPED | OUTPATIENT
Start: 2021-02-11 | End: 2021-03-24

## 2021-02-11 RX ORDER — AMLODIPINE BESYLATE 2.5 MG/1
2.5 TABLET ORAL
COMMUNITY
Start: 2021-01-15 | End: 2021-03-24 | Stop reason: SDUPTHER

## 2021-02-11 RX ORDER — NALTREXONE HYDROCHLORIDE 50 MG/1
TABLET, FILM COATED ORAL
COMMUNITY
Start: 2020-11-25 | End: 2021-02-11

## 2021-02-11 RX ORDER — IVERMECTIN 3 MG/1
TABLET ORAL
COMMUNITY
Start: 2021-01-14 | End: 2021-02-11

## 2021-02-11 RX ADMIN — KETOROLAC TROMETHAMINE 30 MG: 30 INJECTION, SOLUTION INTRAMUSCULAR; INTRAVENOUS at 21:01

## 2021-02-11 ASSESSMENT — ENCOUNTER SYMPTOMS
COUGH: 1
RHINORRHEA: 0
FOCAL WEAKNESS: 0
SHORTNESS OF BREATH: 0
VOMITING: 0
CHILLS: 0
ABDOMINAL PAIN: 0
WHEEZING: 0
SORE THROAT: 1
DIARRHEA: 0
NECK PAIN: 1
TINGLING: 0
HEADACHES: 0
SENSORY CHANGE: 0
NAUSEA: 0
SINUS PAIN: 0
PALPITATIONS: 0
FEVER: 0

## 2021-02-11 ASSESSMENT — FIBROSIS 4 INDEX: FIB4 SCORE: 0.89

## 2021-02-12 ENCOUNTER — TELEPHONE (OUTPATIENT)
Dept: SCHEDULING | Facility: IMAGING CENTER | Age: 41
End: 2021-02-12

## 2021-02-12 NOTE — PROGRESS NOTES
Subjective:      Cira Ferguson is a 40 y.o. female who presents with Otalgia ((R) ear, feels irritated, muffeled hearing)            Otalgia   There is pain in the right ear. This is a new problem. Episode onset: > 1 month  The problem occurs constantly. The problem has been waxing and waning. There has been no fever. The pain is severe. Associated symptoms include coughing, hearing loss (muffled hearing right ear ), neck pain (right neck pain and lymph node swelling ) and a sore throat. Pertinent negatives include no abdominal pain, diarrhea, ear discharge, headaches, rhinorrhea or vomiting. She has tried acetaminophen, NSAIDs, antibiotics and ear drops (Augmentin- stopped last week. Ofloxacin drops currently ) for the symptoms. The treatment provided no relief.     The patient was recently seen in the ED 10 days ago. She was thought to have possible COVID. Covid test came back negative. She saw her PCP yesterday and was told that she had an ear infection and right TM rupture. Today she reports feeling right neck pain, lymph node swelling in right neck, popping in right ear and muffled hearing in right ear. No recent fevers. She still has sore throat and mild cough. The patient received a shot of Rocephin yesterday for STI per patient. The patient states her pain is severe and that Ibuprofen and Tylenol are not working.    Past Medical History:   Diagnosis Date   • Bipolar disorder (HCC)    • Cervical cancer (HCC)    • DVT (deep venous thrombosis) (HCC) 2013    leg January 2013   • Fall     2010   • Hepatitis C    • Infectious disease     Hep C   • IV drug abuse (HCC)     Quit 2012 per pt   • PE (pulmonary thromboembolism) (HCC) 2013   • Psychiatric disorder     bipolar   • Schizoaffective disorder (HCC)    • Schizophrenia (HCC)        Past Surgical History:   Procedure Laterality Date   • PB LAMINOTOMY,LUMBAR DISK,1 INTRSP Left 1/23/2020    Procedure: LAMINECTOMY, SPINE, LUMBAR, WITH DISCECTOMY  L3-L4;   Surgeon: Tae Garcia M.D.;  Location: SURGERY [facility];  Service: Neurosurgery   • VAGINAL HYSTERECTOMY SCOPE TOTAL  10/23/2014    Performed by Levar Trevino M.D. at SURGERY SAME DAY [facility]   • SALPINGECTOMY  10/23/2014    Performed by Levar Trevino M.D. at SURGERY SAME DAY [facility]   • ANTERIOR AND POSTERIOR REPAIR  10/23/2014    Performed by Levar Trevino M.D. at SURGERY SAME DAY [facility]   • ENTEROCELE REPAIR  10/23/2014    Performed by Levar Trevino M.D. at SURGERY SAME DAY [facility]   • VAGINAL SUSPENSION  10/23/2014    Performed by Levar Trevino M.D. at SURGERY SAME DAY [facility]   • BLADDER SLING FEMALE  10/23/2014    Performed by Levar Trevino M.D. at SURGERY SAME DAY [facility]   • CYSTOSCOPY  10/23/2014    Performed by Levar Trevino M.D. at SURGERY SAME DAY [facility]   • LUMBAR FUSION POSTERIOR  2012    Performed by Lauryn French M.D. at SURGERY [facility]   • LUMBAR DECOMPRESSION  2012    Performed by Lauryn French M.D. at SURGERY [facility]   • LUMBAR LAMINECTOMY DISKECTOMY  11/10/2011    Performed by LAURYN FRENCH at SURGERY [facility]   • LUMBAR LAMINECTOMY DISKECTOMY  2011    Performed by TAE GARCIA at SURGERY [facility]   • GYN SURGERY      abnormal PAP smears   • PRIMARY C SECTION      tubal ligation, , fallopian tube (one jewels)       Family History   Problem Relation Age of Onset   • Heart Disease Mother    • Diabetes Mother    • Lung Disease Mother    • Alcohol/Drug Father        Allergies   Allergen Reactions   • Contrast Media With Iodine [Iodine]    • Shellfish Allergy      Shellfish & Seafood   • Sulfa Drugs      Unknown rxn       Medications, Allergies, and current problem list reviewed today in Epic      Review of Systems   Constitutional: Negative for chills, fever and malaise/fatigue.   HENT: Positive for ear pain, hearing loss (muffled hearing right ear ) and sore throat. Negative  "for congestion, ear discharge, rhinorrhea, sinus pain and tinnitus.    Respiratory: Positive for cough. Negative for shortness of breath and wheezing.    Cardiovascular: Negative for chest pain, palpitations and leg swelling.   Gastrointestinal: Negative for abdominal pain, diarrhea, nausea and vomiting.   Musculoskeletal: Positive for neck pain (right neck pain and lymph node swelling ).   Neurological: Negative for tingling, sensory change, focal weakness and headaches.     All other systems reviewed and are negative.        Objective:     /80 (Patient Position: Sitting)   Pulse 98   Temp 36.2 °C (97.2 °F) (Temporal)   Resp 18   Ht 1.956 m (6' 5\")   Wt (!) 172 kg (379 lb)   LMP 05/01/2015   SpO2 97%   BMI 44.94 kg/m²      Physical Exam  Constitutional:       General: She is not in acute distress.     Appearance: She is not ill-appearing.   HENT:      Head: Normocephalic.      Right Ear: External ear normal. Swelling (moderate right ear canal swelling ) present. No drainage. A middle ear effusion is present. Tympanic membrane is not injected or perforated.      Left Ear: Tympanic membrane, ear canal and external ear normal.      Mouth/Throat:      Mouth: Mucous membranes are moist.      Pharynx: Posterior oropharyngeal erythema (mild) present.      Tonsils: No tonsillar exudate or tonsillar abscesses.   Eyes:      Conjunctiva/sclera: Conjunctivae normal.   Cardiovascular:      Rate and Rhythm: Normal rate and regular rhythm.      Heart sounds: Normal heart sounds.   Pulmonary:      Effort: Pulmonary effort is normal. No respiratory distress.      Breath sounds: Normal breath sounds. No wheezing or rales.   Musculoskeletal:         General: Normal range of motion.   Lymphadenopathy:      Cervical: Cervical adenopathy (moderate anterior cervical adenopathy on right ) present.   Skin:     General: Skin is warm and dry.      Findings: No rash.   Neurological:      General: No focal deficit present.      " Mental Status: She is alert and oriented to person, place, and time.   Psychiatric:         Mood and Affect: Mood normal.         Thought Content: Thought content normal.         Judgment: Judgment normal.                 Assessment/Plan:        1. Otalgia, right ear    - methylPREDNISolone (MEDROL DOSEPAK) 4 MG Tablet Therapy Pack; Follow schedule on package instructions.  Dispense: 21 tablet; Refill: 0  - ketorolac (TORADOL) injection 30 mg  - ibuprofen (MOTRIN) 600 MG Tab; Take 1 tablet by mouth every 8 hours as needed for Moderate Pain.  Dispense: 30 tablet; Refill: 0  Continue Ofloxacin ear drop      2. Sore throat  Poct strep- negative   poct mono- negative    3. Lymphadenopathy  Strep and Mono test negative   - doxycycline (VIBRAMYCIN) 100 MG Tab; Take 1 tablet by mouth 2 times a day for 7 days.  Dispense: 14 tablet; Refill: 0  - methylPREDNISolone (MEDROL DOSEPAK) 4 MG Tablet Therapy Pack; Follow schedule on package instructions.  Dispense: 21 tablet; Refill: 0  - ketorolac (TORADOL) injection 30 mg  - ibuprofen (MOTRIN) 600 MG Tab; Take 1 tablet by mouth every 8 hours as needed for Moderate Pain.  Dispense: 30 tablet; Refill: 0  Start after finish prednisone.    4. Dysfunction of right eustachian tube  Continue Loratidine.   - doxycycline (VIBRAMYCIN) 100 MG Tab; Take 1 tablet by mouth 2 times a day for 7 days.  Dispense: 14 tablet; Refill: 0  - methylPREDNISolone (MEDROL DOSEPAK) 4 MG Tablet Therapy Pack; Follow schedule on package instructions.  Dispense: 21 tablet; Refill: 0  - ketorolac (TORADOL) injection 30 mg  - ibuprofen (MOTRIN) 600 MG Tab; Take 1 tablet by mouth every 8 hours as needed for Moderate Pain.  Dispense: 30 tablet; Refill: 0      Differential diagnoses, Supportive care, and indications for immediate follow-up discussed with patient.   Pathogenesis of diagnosis discussed including typical length and natural progression.   Instructed to return to clinic or nearest emergency department for  any change in condition, further concerns, or worsening of symptoms.    The patient demonstrated a good understanding and agreed with the treatment plan.    Nina Rolle P.A.-C.

## 2021-02-21 ENCOUNTER — APPOINTMENT (OUTPATIENT)
Dept: RADIOLOGY | Facility: IMAGING CENTER | Age: 41
End: 2021-02-21
Attending: FAMILY MEDICINE
Payer: MEDICAID

## 2021-02-21 ENCOUNTER — OFFICE VISIT (OUTPATIENT)
Dept: URGENT CARE | Facility: CLINIC | Age: 41
End: 2021-02-21
Payer: MEDICAID

## 2021-02-21 ENCOUNTER — HOSPITAL ENCOUNTER (OUTPATIENT)
Facility: MEDICAL CENTER | Age: 41
End: 2021-02-21
Attending: FAMILY MEDICINE
Payer: MEDICAID

## 2021-02-21 VITALS
WEIGHT: 293 LBS | HEIGHT: 72 IN | RESPIRATION RATE: 18 BRPM | DIASTOLIC BLOOD PRESSURE: 68 MMHG | TEMPERATURE: 97.5 F | HEART RATE: 104 BPM | OXYGEN SATURATION: 97 % | SYSTOLIC BLOOD PRESSURE: 126 MMHG | BODY MASS INDEX: 39.68 KG/M2

## 2021-02-21 DIAGNOSIS — R05.9 COUGH: ICD-10-CM

## 2021-02-21 DIAGNOSIS — Z20.822 SUSPECTED COVID-19 VIRUS INFECTION: ICD-10-CM

## 2021-02-21 PROCEDURE — 71046 X-RAY EXAM CHEST 2 VIEWS: CPT | Mod: TC | Performed by: FAMILY MEDICINE

## 2021-02-21 PROCEDURE — U0005 INFEC AGEN DETEC AMPLI PROBE: HCPCS

## 2021-02-21 PROCEDURE — U0003 INFECTIOUS AGENT DETECTION BY NUCLEIC ACID (DNA OR RNA); SEVERE ACUTE RESPIRATORY SYNDROME CORONAVIRUS 2 (SARS-COV-2) (CORONAVIRUS DISEASE [COVID-19]), AMPLIFIED PROBE TECHNIQUE, MAKING USE OF HIGH THROUGHPUT TECHNOLOGIES AS DESCRIBED BY CMS-2020-01-R: HCPCS

## 2021-02-21 PROCEDURE — 99214 OFFICE O/P EST MOD 30 MIN: CPT | Mod: CS | Performed by: FAMILY MEDICINE

## 2021-02-21 RX ORDER — BENZONATATE 100 MG/1
100 CAPSULE ORAL 3 TIMES DAILY PRN
Qty: 30 CAPSULE | Refills: 0 | Status: SHIPPED | OUTPATIENT
Start: 2021-02-21 | End: 2021-03-24

## 2021-02-21 ASSESSMENT — ENCOUNTER SYMPTOMS
SHORTNESS OF BREATH: 0
SORE THROAT: 0
DIZZINESS: 0
MYALGIAS: 0
VOMITING: 0
COUGH: 1
CHILLS: 0
FEVER: 0
NAUSEA: 0
DIARRHEA: 0

## 2021-02-21 ASSESSMENT — FIBROSIS 4 INDEX: FIB4 SCORE: 0.89

## 2021-02-22 DIAGNOSIS — Z20.822 SUSPECTED COVID-19 VIRUS INFECTION: ICD-10-CM

## 2021-02-22 LAB
COVID ORDER STATUS COVID19: NORMAL
SARS-COV-2 RNA RESP QL NAA+PROBE: NOTDETECTED
SPECIMEN SOURCE: NORMAL

## 2021-02-22 NOTE — PATIENT INSTRUCTIONS
INSTRUCTIONS FOR COVID-19 OR ANY OTHER INFECTIOUS RESPIRATORY ILLNESSES    The Centers for Disease Control and Prevention (CDC) states that early indications for COVID-19 include cough, shortness of breath, difficulty breathing, or at least two of the following symptoms: chills, shaking with chills, muscle pain, headache, sore throat, and loss of taste or smell. Symptoms can range from mild to severe and may appear up to two weeks after exposure to the virus.    The practice of self-isolation and quarantine helps protect the public and your family by  preventing exposure to people who have or may have a contagious disease. Please follow the prevention steps below as based on CDC guidelines:    WHEN TO STOP ISOLATION: Persons with COVID-19 or any other infectious respiratory illness who have symptoms and were advised to care for themselves at home may discontinue home isolation under the following conditions:  · At least 24 hours have passed since recovery defined as resolution of fever without the use of fever-reducing medications; AND,  · Improvement in respiratory symptoms (e.g., cough, shortness of breath); AND,  · At least 10 days have passed since symptoms first appeared and have had no subsequent illness.    MONITOR YOUR SYMPTOMS: If your illness is worsening, seek prompt medical attention. If you have a medical emergency and need to call 911, notify the dispatch personnel that you have, or are being evaluated for confirmed or suspected COVID-19 or another infectious respiratory illness. Wear a facemask if possible.    ACTIVITY RESTRICTION: restrict activities outside your home, except for getting medical care. Do not go to work, school, or public areas. Avoid using public transportation, ride-sharing, or taxis.    SCHEDULED MEDICAL APPOINTMENTS: Notify your provider that you have, or are being evaluated for, confirmed or suspected COVID-19 or another infectious respiratory. This will help the healthcare  provider’s office safely take care of you and keep other people from getting exposed or infected.    FACEMASKS, when to wear: Anytime you are away from your home or around other people or pets. If you are unable to wear one, maintain a minimum of 6 feet distancing from others.    LIVING ENVIRONMENT: Stay in a separate room from other people and pets. If possible, use a separate bathroom, have someone else care for your pets and avoid sharing household items. Any items used should be washed thoroughly with soap and water. Clean all “high-touch” surfaces every day. Use a household cleaning spray or wipe, according to the label instructions. High touch surfaces include (but are not limited to) counters, tabletops, doorknobs, bathroom fixtures, toilets, phones, keyboards, tablets, and bedside tables.     HAND WASHING: Frequently wash hands with soap and water for at least 20 seconds,  especially after blowing your nose, coughing, or sneezing; going to the bathroom; before and after interacting with pets; and before and after eating or preparing food. If hands are visibly dirty use soap and water. If soap and water are not available, use an alcohol-based hand  with at least 60% alcohol. Avoid touching your eyes, nose, and mouth with unwashed hands. Cover your coughs and sneezes with a tissue. Throw used tissues in a lined trash can. Immediately wash your hands.    ACTIVE/FACILITATED SELF-MONITORING: Follow instructions provided by your local health department or health professionals, as appropriate. When working with your local health department check their available hours.    [facility]   Phone Number   [facility] [phone]   [facility] [place] [phone]   [place] Call 211   [place] [phone]     IF YOU HAVE CONFIRMED POSITIVE COVID-19:    Those who have completely recovered from COVID-19 may have immune-boosting antibodies in their plasma--called “convalescent plasma”--that could be  used to treat critically ill COVID19 patients.    [facility] is excited to begin working with [facility] on collecting convalescent plasma from  people who have recovered from COVID-19 as part of a program to treat patients infected with the virus. This FDA-approved “emergency investigational new drug” is a special blood product containing antibodies that may give patients an extra boost to fight the virus.    To be eligible to donate convalescent plasma, you must have a prior COVID-19 diagnosis documented by a laboratory test (or a positive test result for SARS-CoV-2 antibodies) and meet additional eligibility requirements.    If you are interested in donating convalescent plasma or have any additional questions, please contact the [facility] Convalescent Plasma  at [phone] or via e-mail at [email or web address].

## 2021-02-22 NOTE — PROGRESS NOTES
Subjective:   Cira Ferguson is a 40 y.o. female who presents for Cough (horrible cough , keeps her up all night , vomiting , diarrhea)        Cough  This is a new problem. The current episode started in the past 7 days. The problem has been unchanged. The cough is productive of sputum. Pertinent negatives include no chills, fever, myalgias, rash, sore throat or shortness of breath. Associated symptoms comments: There has been community-wide COVID-19 exposure, the patient denies known direct COVID-19 exposure, lives in a transitional housing facility  . Nothing aggravates the symptoms. Treatments tried: recent antibiotics for otitis media.     PMH:  has a past medical history of Bipolar disorder ([facility]), Cervical cancer ([facility]), DVT (deep venous thrombosis) ([facility]) (2013), Fall, Hepatitis C, Infectious disease, IV drug abuse ([facility]), PE (pulmonary thromboembolism) ([facility]) (2013), Psychiatric disorder, Schizoaffective disorder ([facility]), and Schizophrenia ([facility]).  MEDS:   Current Outpatient Medications:   •  benzonatate (TESSALON) 100 MG Cap, Take 1 capsule by mouth 3 times a day as needed for Cough., Disp: 30 capsule, Rfl: 0  •  amLODIPine (NORVASC) 2.5 MG Tab, Take 2.5 mg by mouth every day., Disp: , Rfl:   •  cloNIDine (CATAPRES) 0.1 MG Tab, , Disp: , Rfl:   •  gabapentin (NEURONTIN) 600 MG tablet, Take 600 mg by mouth 3 times a day., Disp: , Rfl:   •  metFORMIN (GLUCOPHAGE) 500 MG Tab, Take 500 mg by mouth every day., Disp: , Rfl:   •  INVEGA SUSTENNA 156 MG/ML Suspension Prefilled Syringe, , Disp: , Rfl:   •  loratadine (CLARITIN) 10 MG Tab, Take 10 mg by mouth every day., Disp: , Rfl:   •  ofloxacin otic sol (FLOXIN OTIC) 0.3 % Solution, Administer 5 Drops into affected ear(s) every day., Disp: , Rfl:   •  methylPREDNISolone (MEDROL DOSEPAK) 4 MG Tablet Therapy Pack, Follow schedule on package instructions., Disp: 21 tablet, Rfl: 0  •  ibuprofen (MOTRIN) 600 MG Tab, Take 1 tablet by mouth every 8 hours as needed for  Moderate Pain., Disp: 30 tablet, Rfl: 0  •  cyclobenzaprine (FLEXERIL) 10 mg Tab, Take 1 Tablet by mouth 3 times a day., Disp: 30 Tab, Rfl: 0  •  acetaminophen (TYLENOL) 325 MG Tab, Take 650 mg by mouth every four hours as needed for Mild Pain., Disp: , Rfl:   •  prazosin (MINIPRESS) 2 MG Cap, Take 4 mg by mouth every bedtime. Hold is diastolic bp falls below 50, Disp: , Rfl:   •  mirtazapine (REMERON) 15 MG Tab, Take 7.5 mg by mouth every evening., Disp: , Rfl:   •  traZODone (DESYREL) 100 MG Tab, Take 100 mg by mouth every evening. Indications: Trouble Sleeping, Disp: , Rfl:   ALLERGIES:   Allergies   Allergen Reactions   • Contrast Media With Iodine [Iodine]    • Shellfish Allergy      Shellfish & Seafood   • Sulfa Drugs      Unknown rxn     SURGHX:   Past Surgical History:   Procedure Laterality Date   • PB LAMINOTOMY,LUMBAR DISK,1 INTRSP Left 1/23/2020    Procedure: LAMINECTOMY, SPINE, LUMBAR, WITH DISCECTOMY  L3-L4;  Surgeon: Tae Garcia M.D.;  Location: SURGERY [facility];  Service: Neurosurgery   • VAGINAL HYSTERECTOMY SCOPE TOTAL  10/23/2014    Performed by Levar Trevino M.D. at SURGERY SAME DAY [facility] ORS   • SALPINGECTOMY  10/23/2014    Performed by Levar Trevino M.D. at SURGERY SAME DAY [facility] ORS   • ANTERIOR AND POSTERIOR REPAIR  10/23/2014    Performed by Levar Trevino M.D. at SURGERY SAME DAY [facility] ORS   • ENTEROCELE REPAIR  10/23/2014    Performed by Levar Trevino M.D. at SURGERY SAME DAY [facility] ORS   • VAGINAL SUSPENSION  10/23/2014    Performed by Levar Trevino M.D. at SURGERY SAME DAY [facility] ORS   • BLADDER SLING FEMALE  10/23/2014    Performed by Levar Trevino M.D. at SURGERY SAME DAY [facility] ORS   • CYSTOSCOPY  10/23/2014    Performed by Levar Trevino M.D. at SURGERY SAME DAY [facility] ORS   • LUMBAR FUSION POSTERIOR  11/5/2012    Performed by Mateusz French M.D. at SURGERY [facility]   • LUMBAR DECOMPRESSION  11/5/2012    Performed by Mateusz HAYDEN  LAUREL French at SURGERY [facility] ORS   • LUMBAR LAMINECTOMY DISKECTOMY  11/10/2011    Performed by LAURYN FRENCH at SURGERY [facility] ORS   • LUMBAR LAMINECTOMY DISKECTOMY  2011    Performed by RAVI MONTENEGRO at SURGERY [facility] ORS   • GYN SURGERY      abnormal PAP smears   • PRIMARY C SECTION      tubal ligation, , fallopian tube (one jewels)     SOCHX:  reports that she has been smoking cigarettes. She has a 7.50 pack-year smoking history. She has never used smokeless tobacco. She reports previous drug use. She reports that she does not drink alcohol.  FH:   Family History   Problem Relation Age of Onset   • Heart Disease Mother    • Diabetes Mother    • Lung Disease Mother    • Alcohol/Drug Father      Review of Systems   Constitutional: Negative for chills and fever.   HENT: Negative for sore throat.    Respiratory: Positive for cough. Negative for shortness of breath.    Gastrointestinal: Negative for diarrhea (complains loose stools ), nausea and vomiting.   Musculoskeletal: Negative for myalgias.   Skin: Negative for rash.   Neurological: Negative for dizziness.        Objective:   /68   Pulse (!) 104   Temp 36.4 °C (97.5 °F) (Temporal)   Resp 18   Ht 1.829 m (6')   Wt (!) 172 kg (379 lb)   LMP 2015   SpO2 97%   BMI 51.40 kg/m²   Physical Exam  Vitals and nursing note reviewed.   Constitutional:       General: She is not in acute distress.     Appearance: She is well-developed.   HENT:      Head: Normocephalic and atraumatic.      Right Ear: Tympanic membrane and external ear normal.      Left Ear: Tympanic membrane and external ear normal.      Nose: Nose normal.      Mouth/Throat:      Mouth: Mucous membranes are moist.   Eyes:      Conjunctiva/sclera: Conjunctivae normal.   Cardiovascular:      Rate and Rhythm: Normal rate and regular rhythm.   Pulmonary:      Effort: Pulmonary effort is normal. No respiratory distress.      Breath sounds: Rhonchi present. No  wheezing.   Abdominal:      General: There is no distension.   Musculoskeletal:         General: Normal range of motion.   Skin:     General: Skin is warm and dry.   Neurological:      General: No focal deficit present.      Mental Status: She is alert and oriented to person, place, and time. Mental status is at baseline.      Gait: Gait (gait at baseline) normal.   Psychiatric:         Judgment: Judgment normal.          DX-CHEST-2 VIEWS  Order: [id number]  Status:  Final result   Visible to patient:  No (scheduled for 2/23/2021  5:33 AM) Next appt:  02/25/2021 at 10:30 AM in OB/Gyn (Kim Diana M.D.) Dx:  Cough; Suspected COVID-19 virus infec...  Details    Reading Physician Reading Date Result Priority   Jared Ellis M.D.  [phone] 2/21/2021 Urgent Care      Narrative & Impression        2/21/2021 5:15 PM     HISTORY/REASON FOR EXAM:  Cough and shortness of breath.     TECHNIQUE/EXAM DESCRIPTION AND NUMBER OF VIEWS:  Two views of the chest.     COMPARISON: None.     FINDINGS:  There is no evidence of focal consolidation or evidence of pulmonary edema.  The heart is normal in size.  There is no evidence of pleural effusion.  Soft tissues and bony structures are unremarkable.        IMPRESSION:     No evidence of acute cardiopulmonary process.             Last Resulted: 02/21/21  5:31 PM                 Assessment/Plan:   1. Suspected COVID-19 virus infection  - SARS-CoV-2 PCR (24 hour In-House): Collect NP swab in VTM; Future  - DX-CHEST-2 VIEWS; Future    2. Cough  - DX-CHEST-2 VIEWS; Future  - benzonatate (TESSALON) 100 MG Cap; Take 1 capsule by mouth 3 times a day as needed for Cough.  Dispense: 30 capsule; Refill: 0    Advised routine CDC social distancing guidelines, symptomatic and supportive measures      Medical Decision Making/Course:  In the course of preparing for this visit with review of the pertinent past medical history, recent and past clinic visits, current medications, and in the  further course of obtaining the current history pertinent to the clinic visit today, performing an exam and evaluation, ordering and independently evaluating labs, tests, and/or procedures, prescribing any recommended new medications, providing any pertinent counseling and education and recommending further coordination of care, at least 30 minutes of total time were spent during this encounter.      Discussed close monitoring, return precautions, and supportive measures of maintaining adequate fluid hydration and caloric intake, relative rest and symptom management as needed for pain and/or fever.    Differential diagnosis, natural history, supportive care, and indications for immediate follow-up discussed.     Advised the patient to follow-up with the primary care physician for recheck, reevaluation, and consideration of further management.    Please note that this dictation was created using voice recognition software. I have worked with consultants from the vendor as well as technical experts from [employer] to optimize the interface. I have made every reasonable attempt to correct obvious errors, but I expect that there are errors of grammar and possibly content that I did not discover before finalizing the note.

## 2021-03-21 ENCOUNTER — HOSPITAL ENCOUNTER (EMERGENCY)
Facility: MEDICAL CENTER | Age: 41
End: 2021-03-21
Attending: EMERGENCY MEDICINE
Payer: MEDICAID

## 2021-03-21 VITALS
OXYGEN SATURATION: 98 % | RESPIRATION RATE: 16 BRPM | SYSTOLIC BLOOD PRESSURE: 132 MMHG | WEIGHT: 293 LBS | BODY MASS INDEX: 39.68 KG/M2 | TEMPERATURE: 97 F | HEART RATE: 91 BPM | DIASTOLIC BLOOD PRESSURE: 89 MMHG | HEIGHT: 72 IN

## 2021-03-21 DIAGNOSIS — M54.50 LUMBAR BACK PAIN: ICD-10-CM

## 2021-03-21 PROCEDURE — 99283 EMERGENCY DEPT VISIT LOW MDM: CPT

## 2021-03-21 PROCEDURE — 96372 THER/PROPH/DIAG INJ SC/IM: CPT

## 2021-03-21 PROCEDURE — 700111 HCHG RX REV CODE 636 W/ 250 OVERRIDE (IP): Mod: JW | Performed by: EMERGENCY MEDICINE

## 2021-03-21 RX ORDER — ONDANSETRON 4 MG/1
4 TABLET, ORALLY DISINTEGRATING ORAL ONCE
Status: COMPLETED | OUTPATIENT
Start: 2021-03-21 | End: 2021-03-21

## 2021-03-21 RX ORDER — IBUPROFEN 600 MG/1
600 TABLET ORAL EVERY 6 HOURS PRN
Qty: 20 TABLET | Refills: 0 | Status: SHIPPED | OUTPATIENT
Start: 2021-03-21 | End: 2021-03-24

## 2021-03-21 RX ORDER — KETOROLAC TROMETHAMINE 30 MG/ML
15 INJECTION, SOLUTION INTRAMUSCULAR; INTRAVENOUS ONCE
Status: COMPLETED | OUTPATIENT
Start: 2021-03-21 | End: 2021-03-21

## 2021-03-21 RX ORDER — ONDANSETRON 2 MG/ML
4 INJECTION INTRAMUSCULAR; INTRAVENOUS ONCE
Status: DISCONTINUED | OUTPATIENT
Start: 2021-03-21 | End: 2021-03-21

## 2021-03-21 RX ORDER — OXYCODONE HYDROCHLORIDE AND ACETAMINOPHEN 5; 325 MG/1; MG/1
1 TABLET ORAL EVERY 6 HOURS PRN
Qty: 12 TABLET | Refills: 0 | Status: SHIPPED | OUTPATIENT
Start: 2021-03-21 | End: 2021-03-24

## 2021-03-21 RX ORDER — MORPHINE SULFATE 4 MG/ML
4 INJECTION, SOLUTION INTRAMUSCULAR; INTRAVENOUS ONCE
Status: COMPLETED | OUTPATIENT
Start: 2021-03-21 | End: 2021-03-21

## 2021-03-21 RX ORDER — HYDROMORPHONE HYDROCHLORIDE 1 MG/ML
1 INJECTION, SOLUTION INTRAMUSCULAR; INTRAVENOUS; SUBCUTANEOUS ONCE
Status: DISCONTINUED | OUTPATIENT
Start: 2021-03-21 | End: 2021-03-21

## 2021-03-21 RX ADMIN — KETOROLAC TROMETHAMINE 15 MG: 30 INJECTION, SOLUTION INTRAMUSCULAR at 12:54

## 2021-03-21 RX ADMIN — ONDANSETRON 4 MG: 4 TABLET, ORALLY DISINTEGRATING ORAL at 12:55

## 2021-03-21 RX ADMIN — MORPHINE SULFATE 4 MG: 4 INJECTION INTRAVENOUS at 12:54

## 2021-03-21 ASSESSMENT — FIBROSIS 4 INDEX: FIB4 SCORE: 0.89

## 2021-03-21 NOTE — ED NOTES
Pt Given discharge instructions/ prescriptions/ home care instructions, Pt verbalized understanding of instructions given, pt ambulatory to [facility]an.

## 2021-03-21 NOTE — ED PROVIDER NOTES
ED Provider Note    CHIEF COMPLAINT  Chief Complaint   Patient presents with   • Back Pain     hx of multiple back surgeries. reports she was tossing and turning last night, awoke and it was tender, now hurting more. pain from shoulders down.        HPI  Cira Ferguson is a 40 y.o. female who presents to the emergency department complaining of low back pain.  The patient has a history of chronic low back pain and has had a number of back surgeries most recently in January of this year.  She had been doing well until this morning when she feels that she made some kind of wrong move this morning and felt a pop in the back and now has more pain than usual.  The patient indicates the pain in the lumbar area, she has chronic numbness of the medial aspect of her right leg but she says now the leg seems normal on both sides.    REVIEW OF SYSTEMS no fever or chills she has not been ill recently, all other systems negative    PAST MEDICAL HISTORY  Past Medical History:   Diagnosis Date   • Bipolar disorder (HCC)    • Cervical cancer (HCC)    • DVT (deep venous thrombosis) ([facility]) 2013    leg January 2013   • Fall 2010   • Hepatitis C    • Infectious disease     Hep C   • IV drug abuse (HCC)     Quit 2012 per pt   • PE (pulmonary thromboembolism) ([facility]) 2013   • Psychiatric disorder     bipolar   • Schizoaffective disorder (HCC)    • Schizophrenia (HCC)        FAMILY HISTORY  Family History   Problem Relation Age of Onset   • Heart Disease Mother    • Diabetes Mother    • Lung Disease Mother    • Alcohol/Drug Father        SOCIAL HISTORY  Social History     Socioeconomic History   • Marital status: Legally      Spouse name: Not on file   • Number of children: Not on file   • Years of education: Not on file   • Highest education level: Not on file   Occupational History   • Not on file   Tobacco Use   • Smoking status: Current Every Day Smoker     Packs/day: 0.50     Years: 15.00     Pack years: 7.50     Types:  Cigarettes   • Smokeless tobacco: Never Used   Substance and Sexual Activity   • Alcohol use: No     Comment: X 13 years   • Drug use: Not Currently     Comment: last use March 17, 2020   • Sexual activity: Yes     Partners: Male   Other Topics Concern   • Not on file   Social History Narrative   • Not on file     Social Determinants of Health     Financial Resource Strain:    • Difficulty of Paying Living Expenses:    Food Insecurity:    • Worried About Running Out of Food in the Last Year:    • Ran Out of Food in the Last Year:    Transportation Needs:    • Lack of Transportation (Medical):    • Lack of Transportation (Non-Medical):    Physical Activity:    • Days of Exercise per Week:    • Minutes of Exercise per Session:    Stress:    • Feeling of Stress :    Social Connections:    • Frequency of Communication with Friends and Family:    • Frequency of Social Gatherings with Friends and Family:    • Attends Confucianism Services:    • Active Member of Clubs or Organizations:    • Attends Club or Organization Meetings:    • Marital Status:    Intimate Partner Violence:    • Fear of Current or Ex-Partner:    • Emotionally Abused:    • Physically Abused:    • Sexually Abused:        SURGICAL HISTORY  Past Surgical History:   Procedure Laterality Date   • PB LAMINOTOMY,LUMBAR DISK,1 INTRSP Left 1/23/2020    Procedure: LAMINECTOMY, SPINE, LUMBAR, WITH DISCECTOMY  L3-L4;  Surgeon: Tae Garcia M.D.;  Location: SURGERY [facility];  Service: Neurosurgery   • VAGINAL HYSTERECTOMY SCOPE TOTAL  10/23/2014    Performed by Levar Trevino M.D. at SURGERY SAME DAY [facility] ORS   • SALPINGECTOMY  10/23/2014    Performed by Levar Trevino M.D. at SURGERY SAME DAY [facility] ORS   • ANTERIOR AND POSTERIOR REPAIR  10/23/2014    Performed by Levar Trevino M.D. at SURGERY SAME DAY [facility] ORS   • ENTEROCELE REPAIR  10/23/2014    Performed by Levar Trevino M.D. at SURGERY SAME DAY [facility] ORS   • VAGINAL SUSPENSION   10/23/2014    Performed by Levar Trevino M.D. at SURGERY SAME DAY [facility] ORS   • BLADDER SLING FEMALE  10/23/2014    Performed by Levar Trevino M.D. at SURGERY SAME DAY [facility] ORS   • CYSTOSCOPY  10/23/2014    Performed by Levar Trevino M.D. at SURGERY SAME DAY [facility] ORS   • LUMBAR FUSION POSTERIOR  2012    Performed by Lauryn French M.D. at SURGERY [facility] ORS   • LUMBAR DECOMPRESSION  2012    Performed by Lauryn French M.D. at SURGERY [facility] ORS   • LUMBAR LAMINECTOMY DISKECTOMY  11/10/2011    Performed by LAURYN FRENCH at SURGERY [facility] ORS   • LUMBAR LAMINECTOMY DISKECTOMY  2011    Performed by RAVI MONTENEGRO at SURGERY [facility] ORS   • GYN SURGERY      abnormal PAP smears   • PRIMARY C SECTION      tubal ligation, , fallopian tube (one jewels)       CURRENT MEDICATIONS  Home Medications     Reviewed by Bryan Sandoval R.N. (Registered Nurse) on 21 at 1155  Med List Status: Partial   Medication Last Dose Status   acetaminophen (TYLENOL) 325 MG Tab  Active   amLODIPine (NORVASC) 2.5 MG Tab  Active   benzonatate (TESSALON) 100 MG Cap  Active   cloNIDine (CATAPRES) 0.1 MG Tab  Active   cyclobenzaprine (FLEXERIL) 10 mg Tab  Active   gabapentin (NEURONTIN) 600 MG tablet  Active   ibuprofen (MOTRIN) 600 MG Tab  Active   INVEGA SUSTENNA 156 MG/ML Suspension Prefilled Syringe  Active   loratadine (CLARITIN) 10 MG Tab  Active   metFORMIN (GLUCOPHAGE) 500 MG Tab  Active   methylPREDNISolone (MEDROL DOSEPAK) 4 MG Tablet Therapy Pack  Active   mirtazapine (REMERON) 15 MG Tab  Active   ofloxacin otic sol (FLOXIN OTIC) 0.3 % Solution  Active   prazosin (MINIPRESS) 2 MG Cap  Active   traZODone (DESYREL) 100 MG Tab  Active                ALLERGIES  Allergies   Allergen Reactions   • Contrast Media With Iodine [Iodine]    • Shellfish Allergy      Shellfish & Seafood   • Sulfa Drugs      Unknown rxn       PHYSICAL EXAM  VITAL SIGNS: /89   Pulse 91   Temp  36.1 °C (97 °F)   Resp 16   Ht 1.829 m (6')   Wt (!) 172 kg (378 lb 5 oz)   LMP 05/01/2015   SpO2 98%   BMI 51.31 kg/m²    Oxygen saturation is interpreted as adequate  Constitutional: Awake pleasant nontoxic-appearing individual  Eyes: No erythema discharge or jaundice  Neck: Trachea midline no JVD  Cardiovascular: Regular rate and rhythm  Lungs: Air and equal bilaterally with no apparent difficulty breathing  Abdomen/Back: Orbitally obese, there is well-healed surgical incision over the lumbar area  Skin: Warm and dry  Musculoskeletal: No acute bony deformity  Neurologic: Patient is awake and verbal she is moving all extremities,    MEDICAL DECISION MAKING and DISPOSITION  The patient and I both feel this is an acute exacerbation of her chronic low back pain, it does not sound like she has had fever or any prodromal illness so I doubt that this is infectious in nature.  In the emergency department the patient was given an intramuscular injection of Toradol and morphine and I have written her a 3-day prescription for Percocet, she has a follow-up appointment already scheduled with her primary care clinic in 3 days and I have encouraged her to keep that appointment.  If the pain is out of control or she otherwise has new or worsening symptoms or fever she is to return here for recheck    IMPRESSION  1.  Acute exacerbation of chronic low back pain         Electronically signed by: Liborio Reyes M.D., 3/21/2021 1:26 PM

## 2021-03-21 NOTE — ED NOTES
Pt ambulated to room without assistance, Strong gate noted. Pt escorted by ED tech. Pt changing into gown. Ready for eval.

## 2021-03-21 NOTE — ED TRIAGE NOTES
Chief Complaint   Patient presents with   • Back Pain     hx of multiple back surgeries. reports she was tossing and turning last night, awoke and it was tender, now hurting more. pain from shoulders down.      Pt ambulatory to triage for above. NAD noted.    /105   Pulse 98   Temp 36.1 °C (97 °F)   Resp 16   Ht 1.829 m (6')   Wt (!) 172 kg (378 lb 5 oz)   LMP 05/01/2015   SpO2 95%   BMI 51.31 kg/m²

## 2021-03-24 ENCOUNTER — PHARMACY VISIT (OUTPATIENT)
Dept: PHARMACY | Facility: MEDICAL CENTER | Age: 41
End: 2021-03-24
Payer: COMMERCIAL

## 2021-03-24 ENCOUNTER — OFFICE VISIT (OUTPATIENT)
Dept: MEDICAL GROUP | Facility: MEDICAL CENTER | Age: 41
End: 2021-03-24
Attending: NURSE PRACTITIONER
Payer: MEDICAID

## 2021-03-24 ENCOUNTER — PATIENT MESSAGE (OUTPATIENT)
Dept: MEDICAL GROUP | Facility: MEDICAL CENTER | Age: 41
End: 2021-03-24

## 2021-03-24 VITALS
HEIGHT: 72 IN | RESPIRATION RATE: 16 BRPM | SYSTOLIC BLOOD PRESSURE: 132 MMHG | WEIGHT: 293 LBS | HEART RATE: 96 BPM | OXYGEN SATURATION: 100 % | BODY MASS INDEX: 39.68 KG/M2 | DIASTOLIC BLOOD PRESSURE: 60 MMHG | TEMPERATURE: 96.7 F

## 2021-03-24 DIAGNOSIS — L68.0 HIRSUTISM: ICD-10-CM

## 2021-03-24 DIAGNOSIS — M54.42 ACUTE MIDLINE LOW BACK PAIN WITH LEFT-SIDED SCIATICA: ICD-10-CM

## 2021-03-24 DIAGNOSIS — Z11.3 ROUTINE SCREENING FOR STI (SEXUALLY TRANSMITTED INFECTION): ICD-10-CM

## 2021-03-24 DIAGNOSIS — F17.200 TOBACCO DEPENDENCE: ICD-10-CM

## 2021-03-24 DIAGNOSIS — E11.40 TYPE 2 DIABETES MELLITUS WITH DIABETIC NEUROPATHY, WITHOUT LONG-TERM CURRENT USE OF INSULIN (HCC): ICD-10-CM

## 2021-03-24 DIAGNOSIS — H66.90 EAR INFECTION: ICD-10-CM

## 2021-03-24 DIAGNOSIS — Z13.228 SCREENING FOR METABOLIC DISORDER: ICD-10-CM

## 2021-03-24 DIAGNOSIS — Z13.6 SCREENING FOR CARDIOVASCULAR CONDITION: ICD-10-CM

## 2021-03-24 DIAGNOSIS — Z13.21 ENCOUNTER FOR VITAMIN DEFICIENCY SCREENING: ICD-10-CM

## 2021-03-24 DIAGNOSIS — Z76.89 ENCOUNTER TO ESTABLISH CARE: ICD-10-CM

## 2021-03-24 DIAGNOSIS — J45.909 MILD ASTHMA WITHOUT COMPLICATION, UNSPECIFIED WHETHER PERSISTENT: ICD-10-CM

## 2021-03-24 DIAGNOSIS — I10 ESSENTIAL HYPERTENSION: ICD-10-CM

## 2021-03-24 DIAGNOSIS — Z23 NEED FOR VACCINATION: ICD-10-CM

## 2021-03-24 PROBLEM — R09.02 HYPOXIA: Status: RESOLVED | Noted: 2020-10-13 | Resolved: 2021-03-24

## 2021-03-24 PROBLEM — E11.49 TYPE 2 DIABETES MELLITUS WITH NEUROLOGIC COMPLICATION, WITHOUT LONG-TERM CURRENT USE OF INSULIN (HCC): Status: ACTIVE | Noted: 2021-03-24

## 2021-03-24 PROBLEM — L02.92 BOIL: Status: RESOLVED | Noted: 2018-02-02 | Resolved: 2021-03-24

## 2021-03-24 PROBLEM — D72.829 LEUKOCYTOSIS: Status: RESOLVED | Noted: 2020-01-25 | Resolved: 2021-03-24

## 2021-03-24 PROCEDURE — RXMED WILLOW AMBULATORY MEDICATION CHARGE: Performed by: NURSE PRACTITIONER

## 2021-03-24 PROCEDURE — 99213 OFFICE O/P EST LOW 20 MIN: CPT | Performed by: NURSE PRACTITIONER

## 2021-03-24 PROCEDURE — 99214 OFFICE O/P EST MOD 30 MIN: CPT | Performed by: NURSE PRACTITIONER

## 2021-03-24 RX ORDER — METFORMIN HYDROCHLORIDE 500 MG/1
500 TABLET, EXTENDED RELEASE ORAL DAILY
Qty: 30 TABLET | Refills: 3 | Status: SHIPPED | OUTPATIENT
Start: 2021-03-24 | End: 2022-01-10 | Stop reason: SDUPTHER

## 2021-03-24 RX ORDER — FLUTICASONE PROPIONATE 50 MCG
1 SPRAY, SUSPENSION (ML) NASAL DAILY
Qty: 16 G | Refills: 11 | Status: SHIPPED | OUTPATIENT
Start: 2021-03-24 | End: 2022-01-26

## 2021-03-24 RX ORDER — CYCLOBENZAPRINE HCL 10 MG
10 TABLET ORAL 3 TIMES DAILY
Qty: 90 TABLET | Refills: 5 | Status: SHIPPED | OUTPATIENT
Start: 2021-03-24 | End: 2022-01-10 | Stop reason: SDUPTHER

## 2021-03-24 RX ORDER — NICOTINE 21 MG/24HR
1 PATCH, TRANSDERMAL 24 HOURS TRANSDERMAL EVERY 24 HOURS
Qty: 30 PATCH | Refills: 1 | Status: SHIPPED | OUTPATIENT
Start: 2021-03-24 | End: 2022-01-26

## 2021-03-24 RX ORDER — DIPHENHYDRAMINE HYDROCHLORIDE 25 MG/1
CAPSULE, LIQUID FILLED ORAL
Qty: 1 KIT | Refills: 0 | Status: SHIPPED | OUTPATIENT
Start: 2021-03-24 | End: 2022-01-10 | Stop reason: SDUPTHER

## 2021-03-24 RX ORDER — IBUPROFEN 800 MG/1
800 TABLET ORAL EVERY 8 HOURS PRN
Qty: 90 TABLET | Refills: 5 | Status: SHIPPED | OUTPATIENT
Start: 2021-03-24 | End: 2022-01-10 | Stop reason: SDUPTHER

## 2021-03-24 RX ORDER — ALBUTEROL SULFATE 90 UG/1
2 AEROSOL, METERED RESPIRATORY (INHALATION) EVERY 6 HOURS PRN
Qty: 8.5 G | Refills: 11 | Status: SHIPPED | OUTPATIENT
Start: 2021-03-24 | End: 2022-01-26

## 2021-03-24 RX ORDER — SPIRONOLACTONE 25 MG/1
25 TABLET ORAL 2 TIMES DAILY
Qty: 60 TABLET | Refills: 3 | Status: SHIPPED | OUTPATIENT
Start: 2021-03-24 | End: 2022-01-10 | Stop reason: SDUPTHER

## 2021-03-24 RX ORDER — AMLODIPINE BESYLATE 2.5 MG/1
2.5 TABLET ORAL
Qty: 30 TABLET | Refills: 3 | Status: SHIPPED | OUTPATIENT
Start: 2021-03-24 | End: 2021-08-26 | Stop reason: SDUPTHER

## 2021-03-24 RX ORDER — ERGOCALCIFEROL 1.25 MG/1
CAPSULE ORAL
COMMUNITY
Start: 2021-02-18 | End: 2022-01-26

## 2021-03-24 ASSESSMENT — ENCOUNTER SYMPTOMS
BLOOD IN STOOL: 0
CHILLS: 0
PALPITATIONS: 0
FEVER: 0
CONSTIPATION: 0
WHEEZING: 1
SHORTNESS OF BREATH: 1
BACK PAIN: 1
COUGH: 0
ABDOMINAL PAIN: 0
DIARRHEA: 0
WEIGHT LOSS: 0

## 2021-03-24 ASSESSMENT — FIBROSIS 4 INDEX: FIB4 SCORE: 0.89

## 2021-03-24 NOTE — PATIENT INSTRUCTIONS
Please call [phone] Monday through Friday between 8 a.m. and 4:30 p.m. to schedule an appointment for vaccination at the [facility]- Hep B vaccine b/c of diabetes

## 2021-03-24 NOTE — ASSESSMENT & PLAN NOTE
Dx one year ago.  She has been unable to check her sugars for about 2 months as she has been without a monitor.  She takes metformin. She last saw an ophtho last month- she has macular degeneration in her left eye.

## 2021-03-24 NOTE — ASSESSMENT & PLAN NOTE
Prior PCP: Marilynn Navarrete at [facility]    Other Providers:  Mental Health- [facility] [place]  Psych- Dr. Pettit at [facility]  GYN- [facility]

## 2021-03-24 NOTE — PROGRESS NOTES
Chief Complaint   Patient presents with   • Establish Care       Subjective:     HPI:   Cira Ferguson is a 40 y.o. female here to establish care, glucose monitor,  and to discuss the evaluation and management of:      Encounter to establish care  Prior PCP: Marilynn Navarrete at [facility]    Other Providers:  [facility]- [facility]  Psych- Dr. Pettit at [facility]  GYN- [facility]       Type 2 diabetes mellitus with neurologic complication, without long-term current use of insulin (HCC)  Dx one year ago.  She has been unable to check her sugars for about 2 months as she has been without a monitor.  She takes metformin. She last saw an ophtho last month- she has macular degeneration in her left eye.      Essential hypertension  Present for about a year- she has only been caring for herself for a year as that is when she got sober.  She reports blurred vision and SOB.  She denies chest pain.      Ear infection  Present for about a year bilaterally.  She has been on 9 ABX courses in the last year.  She was told her right Tm burst, but a week later she was told it did not.  She has daily ear pain.  She reports crusted drainage from the right ear.  She has an appt with ENT 4/8/21.      Asthma  Dx/onset:childhood  Frequency of SOB: daily  Frequency of inhaler use: She does not have a rescue inhaler- if she did she would use it 3-4 times daily  Ability to participate in ADLs: NO  Frequency of night awakenings: 4 times per night  Aggravating factors: activity, wind, chemical   Alleviating factors: rest  Hospitalizations: no    She has never had a daily controller medication      Tobacco dependence  She has smoked since she was 8 y/o- she currently smokes 10 cig/day.  She is interested in patches.  She was using the 14 mg patch    Hirsutism  Present since her hysterectomy in 2015.       ROS  Review of Systems   Constitutional: Negative for chills, fever, malaise/fatigue and weight loss.   Respiratory:  Positive for shortness of breath and wheezing. Negative for cough.    Cardiovascular: Negative for chest pain, palpitations and leg swelling.   Gastrointestinal: Negative for abdominal pain, blood in stool, constipation and diarrhea.   Musculoskeletal: Positive for back pain.         Allergies   Allergen Reactions   • Contrast Media With Iodine [Iodine]    • Shellfish Allergy      Shellfish & Seafood   • Sulfa Drugs      Unknown rxn       Current medicines (including changes today)  Current Outpatient Medications   Medication Sig Dispense Refill   • Blood Glucose Monitoring Suppl (BLOOD GLUCOSE MONITOR SYSTEM) w/Device Kit Use to test blood sugar as directed. 1 Kit 0   • metFORMIN ER (GLUCOPHAGE XR) 500 MG TABLET SR 24 HR Take 1 tablet by mouth every day. 30 tablet 3   • amLODIPine (NORVASC) 2.5 MG Tab Take 1 tablet by mouth every day. 30 tablet 3   • spironolactone (ALDACTONE) 25 MG Tab Take 1 tablet by mouth 2 times a day. 60 tablet 3   • ibuprofen (MOTRIN) 800 MG Tab Take 1 tablet by mouth every 8 hours as needed. Take with food 90 tablet 5   • cyclobenzaprine (FLEXERIL) 10 mg Tab Take 1 Tablet by mouth 3 times a day. 90 tablet 5   • fluticasone-salmeterol (WIXELA INHUB) 250-50 MCG/DOSE AEROSOL POWDER, BREATH ACTIVATED Inhale 1 Puff every 12 hours. 60 Each 5   • nicotine (NICODERM) 14 MG/24HR PATCH 24 HR Place 1 Patch on the skin every 24 hours. 30 Patch 1   • fluticasone (FLONASE) 50 MCG/ACT nasal spray Administer 1 Spray into affected nostril(S) every day. 16 g 11   • Misc. Devices Misc Please provide patient with a large walker with a seat 1 Each 0   • albuterol 108 (90 Base) MCG/ACT Aero Soln inhalation aerosol Inhale 2 Puffs every 6 hours as needed for Shortness of Breath. 8.5 g 11   • cloNIDine (CATAPRES) 0.1 MG Tab      • gabapentin (NEURONTIN) 600 MG tablet Take 600 mg by mouth 3 times a day.     • INVEGA SUSTENNA 156 MG/ML Suspension Prefilled Syringe      • prazosin (MINIPRESS) 2 MG Cap Take 4 mg by  mouth every bedtime. Hold is diastolic bp falls below 50     • mirtazapine (REMERON) 15 MG Tab Take 7.5 mg by mouth every evening.     • traZODone (DESYREL) 100 MG Tab Take 100 mg by mouth every evening. Indications: Trouble Sleeping     • ergocalciferol (DRISDOL) 84793 UNIT capsule TAKE 1 CAPSULE BY MOUTH EVERY WEEK FOR 12 WEEKS     • loratadine (CLARITIN) 10 MG Tab Take 10 mg by mouth every day.     • ofloxacin otic sol (FLOXIN OTIC) 0.3 % Solution Administer 5 Drops into affected ear(s) every day.     • acetaminophen (TYLENOL) 325 MG Tab Take 650 mg by mouth every four hours as needed for Mild Pain.       No current facility-administered medications for this visit.     She  has a past medical history of Bipolar disorder ([facility]), Boil (2/2/2018), Cervical cancer ([facility]) (2015), DVT (deep venous thrombosis) ([facility]) (2013), Fall, Hepatitis C, Hypoxia (10/13/2020), Infectious disease, IV drug abuse ([facility]), Leukocytosis (1/25/2020), PE (pulmonary thromboembolism) ([facility]) (2013), Psychiatric disorder, Schizoaffective disorder ([facility]), and Schizophrenia ([facility]).  She  has a past surgical history that includes lumbar laminectomy diskectomy (5/17/2011); gyn surgery; primary c section; lumbar laminectomy diskectomy (11/10/2011); lumbar fusion posterior (11/5/2012); lumbar decompression (11/5/2012); vaginal hysterectomy scope total (10/23/2014); salpingectomy (10/23/2014); anterior and posterior repair (10/23/2014); enterocele repair (10/23/2014); vaginal suspension (10/23/2014); bladder sling female (10/23/2014); cystoscopy (10/23/2014); and pr laminotomy,lumbar disk,1 intrsp (Left, 1/23/2020).  Social History     Tobacco Use   • Smoking status: Current Every Day Smoker     Packs/day: 0.50     Years: 15.00     Pack years: 7.50     Types: Cigarettes   • Smokeless tobacco: Never Used   Substance Use Topics   • Alcohol use: No     Comment: X 13 years   • Drug use: Not Currently     Comment: last use March 17, 2020       Family History    Problem Relation Age of Onset   • Heart Disease Mother    • Diabetes Mother    • Lung Disease Mother    • Alcohol/Drug Father    • Diabetes Father    • Diabetes Sister    • Diabetes Brother    • Suicide Attempts Maternal Aunt    • Diabetes Maternal Grandmother    • Diabetes Maternal Grandfather    • Diabetes Paternal Grandmother    • Diabetes Paternal Grandfather    • Suicide Attempts Maternal Aunt    • Cancer Maternal Aunt         elijah nd stomach, unsure of primary     Family Status   Relation Name Status   • Mo  Alive   • Fa  Alive   • Sis  Alive   • Bro  Alive   • Son  Alive   • Nikki  Alive       Patient Active Problem List    Diagnosis Date Noted   • Dyspnea on effort 10/13/2020   • Lumbar nerve root compression 11/05/2012   • History of DVT (deep vein thrombosis) 05/01/2017   • Back pain 11/03/2012   • Pulmonary nodule 10/13/2020   • Tobacco dependence 05/23/2018   • Chest pain 12/06/2014   • Encounter to establish care 03/24/2021   • Type 2 diabetes mellitus with neurologic complication, without long-term current use of insulin ([facility]) 03/24/2021   • Essential hypertension 03/24/2021   • Ear infection 03/24/2021   • Hirsutism 03/24/2021   • Skin disease, infectious 05/15/2018   • Morbid obesity with BMI of 45.0-49.9, adult ([facility]) 05/01/2017   • Schizoaffective disorder ([facility]) 05/01/2017   • Incontinence in female 05/01/2017   • Pain associated with defecation 05/01/2017   • Numbness 03/07/2014   • Gait disturbance 11/03/2012   • Weakness of right leg 11/03/2012   • Neurogenic pain 05/23/2011   • Asthma 05/23/2011   • Polycystic ovary syndrome 05/23/2011          Objective:     /60 (BP Location: Left arm, Patient Position: Sitting, BP Cuff Size: Adult)   Pulse 96   Temp 35.9 °C (96.7 °F) (Temporal)   Resp 16   Ht 1.829 m (6')   Wt (!) 172 kg (379 lb 14.4 oz)   SpO2 100%  Body mass index is 51.52 kg/m².    Physical Exam:  Physical Exam   Constitutional: She is oriented to person, place, and time and  well-developed, well-nourished, and in no distress. No distress.   HENT:   Head: Normocephalic.   Right Ear: External ear normal. Tympanic membrane is bulging. A middle ear effusion is present.   Left Ear: External ear normal. Tympanic membrane is bulging. A middle ear effusion is present.   Eyes: Pupils are equal, round, and reactive to light. Conjunctivae and EOM are normal.   Neck: No tracheal deviation present.   Cardiovascular: Normal rate, regular rhythm, normal heart sounds and intact distal pulses.   Pulmonary/Chest: Effort normal and breath sounds normal.   Abdominal: Soft. Bowel sounds are normal.   Musculoskeletal:         General: Normal range of motion.      Cervical back: Normal range of motion and neck supple.   Lymphadenopathy:        Head (right side): No preauricular adenopathy present.        Head (left side): No preauricular adenopathy present.     She has no cervical adenopathy.   Neurological: She is alert and oriented to person, place, and time. She has normal sensation, normal strength and intact cranial nerves. Gait normal.   Skin: Skin is warm and dry.   Psychiatric: Affect and judgment normal.          Assessment and Plan:     The following treatment plan was discussed:    1. Type 2 diabetes mellitus with diabetic neuropathy, without long-term current use of insulin ([facility])  Blood Glucose Monitoring Suppl (BLOOD GLUCOSE MONITOR SYSTEM) w/Device Kit    REFERRAL TO PHARMACOTHERAPY SERVICE    metFORMIN ER (GLUCOPHAGE XR) 500 MG TABLET SR 24 HR    HEMOGLOBIN A1C    Lipid Profile  -Chronic problem, unclear stability.  We will refill her Metformin, although we will change to extended release formulation as she experiences some GI distress.  She is scheduled with our pharmacy team on 4/13/2021 for further evaluation and treatment.  We did discuss that she will likely need a statin pending lipid results, patient verbalized understanding.   2. Essential hypertension  amLODIPine (NORVASC) 2.5 MG Tab     spironolactone (ALDACTONE) 25 MG Tab  -Chronic problem, unstable.  Patient is getting elevated blood pressure readings in the community including systolic blood pressures greater than 160.  Considering she has hirsutism as well we will keep her amlodipine at 2.5 mg daily and add spironolactone 25 mg twice daily. Potential side effects and discontinuation criteria were discussed with patient, patient verbalized understanding.     3. Ear infection  fluticasone (FLONASE) 50 MCG/ACT nasal spray  -Chronic problem, unstable.  No signs of infection today.  Keep appointment with ENT for next month.  We will add Flonase.   4. Need for vaccination  Tdap =>6yo IM- cancelled unavailable   5. Mild asthma without complication, unspecified whether persistent  fluticasone-salmeterol (WIXELA INHUB) 250-50 MCG/DOSE AEROSOL POWDER, BREATH ACTIVATED  ALBUTEROL INHALER  -Chronic problem, unstable.  She is having daily persistent symptoms.  We will start Wixela-we discussed that this will not help her in the event of an asthma attack/emergency-patient verbalized understanding.  Rescue inhaler.  ER precautions provided.   6. Tobacco dependence  nicotine (NICODERM) 14 MG/24HR PATCH 24 HR  -Chronic problem, improving.  Medication refill.   7. Hirsutism  spironolactone (ALDACTONE) 25 MG Tab  -Chronic problem, unstable.  We will try spironolactone for her hirsutism. .megse     8. Acute midline low back pain with left-sided sciatica  ibuprofen (MOTRIN) 800 MG Tab    cyclobenzaprine (FLEXERIL) 10 mg Tab  -Chronic problem, stable.  Medication refill.    Misc. Devices Misc   9. Encounter to establish care     10. Screening for metabolic disorder  TSH WITH REFLEX TO FT4   11. Screening for cardiovascular condition     12. Encounter for vitamin deficiency screening  VITAMIN D,25 HYDROXY   13. Routine screening for STI (sexually transmitted infection)  HIV AG/AB COMBO ASSAY SCREENING    T.PALLIDUM AB EIA    Chlamydia/GC PCR Urine Or Swab        Any change or worsening of signs or symptoms, patient encouraged to follow-up or report to emergency room for further evaluation. Patient verbalizes understanding and agrees.    Follow-Up: Return in about 4 weeks (around 4/21/2021) for Routine follow up.      PLEASE NOTE: This dictation was created using voice recognition software. I have made every reasonable attempt to correct obvious errors, but I expect that there are errors of grammar and possibly content that I did not discover before finalizing the note.

## 2021-03-24 NOTE — ASSESSMENT & PLAN NOTE
She has smoked since she was 6 y/o- she currently smokes 10 cig/day.  She is interested in patches.  She was using the 14 mg patch

## 2021-03-24 NOTE — ASSESSMENT & PLAN NOTE
Present for about a year- she has only been caring for herself for a year as that is when she got sober.  She reports blurred vision and SOB.  She denies chest pain.

## 2021-03-24 NOTE — ASSESSMENT & PLAN NOTE
Dx/onset:childhood  Frequency of SOB: daily  Frequency of inhaler use: She does not have a rescue inhaler- if she did she would use it 3-4 times daily  Ability to participate in ADLs: NO  Frequency of night awakenings: 4 times per night  Aggravating factors: activity, wind, chemical   Alleviating factors: rest  Hospitalizations: no    She has never had a daily controller medication

## 2021-03-24 NOTE — ASSESSMENT & PLAN NOTE
Present for about a year bilaterally.  She has been on 9 ABX courses in the last year.  She was told her right Tm burst, but a week later she was told it did not.  She has daily ear pain.  She reports crusted drainage from the right ear.  She has an appt with ENT 4/8/21.

## 2021-03-24 NOTE — LETTER
March 24, 2021    To Whom It May Concern:         This is confirmation that Cira Ferguson attended her scheduled appointment with BARBARA Smith on 3/24/21.         If you have any questions please do not hesitate to call me at the phone number listed below.    Sincerely,          Valerie London A.P.R.N.  [phone]

## 2021-03-25 RX ORDER — BLOOD SUGAR DIAGNOSTIC
STRIP MISCELLANEOUS
Qty: 100 EACH | Refills: 11 | Status: SHIPPED | OUTPATIENT
Start: 2021-03-25 | End: 2022-01-12 | Stop reason: SDUPTHER

## 2021-03-25 RX ORDER — LANCETS 30 GAUGE
EACH MISCELLANEOUS
Qty: 100 EACH | Refills: 11 | Status: SHIPPED | OUTPATIENT
Start: 2021-03-25 | End: 2022-01-12 | Stop reason: SDUPTHER

## 2021-03-30 ENCOUNTER — HOSPITAL ENCOUNTER (OUTPATIENT)
Facility: MEDICAL CENTER | Age: 41
End: 2021-03-30
Attending: OBSTETRICS & GYNECOLOGY
Payer: MEDICAID

## 2021-03-30 ENCOUNTER — GYNECOLOGY VISIT (OUTPATIENT)
Dept: OBGYN | Facility: CLINIC | Age: 41
End: 2021-03-30
Payer: MEDICAID

## 2021-03-30 VITALS — WEIGHT: 293 LBS | BODY MASS INDEX: 50.86 KG/M2 | DIASTOLIC BLOOD PRESSURE: 82 MMHG | SYSTOLIC BLOOD PRESSURE: 138 MMHG

## 2021-03-30 DIAGNOSIS — N94.10 DYSPAREUNIA IN FEMALE: ICD-10-CM

## 2021-03-30 DIAGNOSIS — Z90.710 HISTORY OF HYSTERECTOMY: ICD-10-CM

## 2021-03-30 DIAGNOSIS — Z87.410 HISTORY OF CERVICAL DYSPLASIA: ICD-10-CM

## 2021-03-30 DIAGNOSIS — R10.2 PELVIC PAIN: ICD-10-CM

## 2021-03-30 DIAGNOSIS — N39.46 MIXED STRESS AND URGE INCONTINENCE: ICD-10-CM

## 2021-03-30 PROCEDURE — 88175 CYTOPATH C/V AUTO FLUID REDO: CPT

## 2021-03-30 PROCEDURE — 99204 OFFICE O/P NEW MOD 45 MIN: CPT | Performed by: OBSTETRICS & GYNECOLOGY

## 2021-03-30 PROCEDURE — 87624 HPV HI-RISK TYP POOLED RSLT: CPT

## 2021-03-30 ASSESSMENT — FIBROSIS 4 INDEX: FIB4 SCORE: 0.89

## 2021-03-30 NOTE — PROGRESS NOTES
"GYN Visit  CC: Pelvic pain, dyspareunia, leaking of urine    Cira Ferguson is a 40 y.o.  who presents today for pelvic pain, dyspareunia, leaking of urine.  Patient has a complex GYN history with below noted surgeries.       laparotomy with left oophorectomy.  Left ovarian \"twisted up on itself\" and so the ovary was removed.  Piece of right ovary also removed at that time.  Performed through large midline vertical.    cryotherapy for cervical dysplasia       with BTL  2016 DANIELE performed by Dr. Trevino.  Patient reports that she was referred to Dr. Trevino because of an abnormal Pap.  She is unsure of the cytology but thinks it may have been cancer.  She was told by Dr. Trevino that she needed to have a hysterectomy and therefore she complied.  She has no idea if her right ovary was removed with hysterectomy but she did have menopausal symptoms right after surgery for about 3 months.  No hormone replacement therapy was provided for her.  She learned later that she also had a \"pelvic floor lift\" and a sling placed at the time of her surgery.  She did not desire a sling to be placed and she had NO LEAKING OF URINE before the surgery.      Ever since this hysterectomy/sling procedure she has had leaking of urine daily.  She reports that she has to wear a pad or change her underwear because of this leaking.  This occurs mostly with a with a laugh/cough/sneeze.  Also leaks when on the way to the bathroom with a strong urge.  Not entire contents of bladder.  Voids every 45 minutes to an hour.  Denies any dysuria, hematuria.  She does drink caffeine and carbonated water and does not drink alcohol.      Reports that also since surgery she has experienced significant dyspareunia.    If her partner is over 5 inches it is very painful with deep penetration. This feels like dryness at times but also deep aching sensation with certain positions.  Also feels a ripping sensation at site of " sling when she stands in the morning.          GYN History:  Patient's last menstrual period was 2015.. Menarche @16.  Menses irregular, lasting 1-10days, painful with irregular flow.  Last pap prior to hysterectomy - thinks it was cervical cancer - unsure pathology.  Also had cryo when 18yo.  Dr. Trevino performed DANIELE with cervix removed.  H/o syphillis twice s/p treatment.  Currently sexually active with one male partner.  Has used Depo, OCPs, Nexplanon, and IUDs for contraception.     OB History:    OB History    Para Term  AB Living   2 2 2         SAB TAB Ectopic Molar Multiple Live Births                    # Outcome Date GA Lbr Nathan/2nd Weight Sex Delivery Anes PTL Lv   2 Term 09    F CS-Unspec      1 Term 03/15/99     CS-Unspec        Review of Systems:   Pertinent positives documented in HPI and all other systems reviewed & are negative.    All PMH, PSH, allergies, social history and FH reviewed and updated today:  Past Medical History:  Past Medical History:   Diagnosis Date   • Asthma    • Bipolar disorder (HCC)    • Boil 2018   • Cervical cancer ([facility])     cervical cancer   • Diabetes (HCC)    • DVT (deep venous thrombosis) ([facility])     leg 2013   • 2010   • GERD (gastroesophageal reflux disease)    • Hepatitis C    • Hypertension    • Hypoxia 10/13/2020   • Infectious disease     Hep C   • IV drug abuse (HCC)     Quit  per pt   • Leukocytosis 2020   • Muscle disorder    • PE (pulmonary thromboembolism) ([facility])    • Psychiatric disorder     bipolar   • Schizoaffective disorder (HCC)    • Schizophrenia ([facility])      Past Surgical History:  Past Surgical History:   Procedure Laterality Date   • PB LAMINOTOMY,LUMBAR DISK,1 INTRSP Left 2020    Procedure: LAMINECTOMY, SPINE, LUMBAR, WITH DISCECTOMY  L3-L4;  Surgeon: Tae Garcia M.D.;  Location: SURGERY [facility];  Service: Neurosurgery   • VAGINAL HYSTERECTOMY SCOPE TOTAL  10/23/2014     Performed by Levar Trevino M.D. at SURGERY SAME DAY [facility]   • SALPINGECTOMY  10/23/2014    Performed by Levar Trevino M.D. at SURGERY SAME DAY [facility]   • ANTERIOR AND POSTERIOR REPAIR  10/23/2014    Performed by Levar Trevino M.D. at SURGERY SAME DAY [facility]   • ENTEROCELE REPAIR  10/23/2014    Performed by Levar Trevino M.D. at SURGERY SAME DAY [facility]   • VAGINAL SUSPENSION  10/23/2014    Performed by Levar Trevino M.D. at SURGERY SAME DAY [facility]   • BLADDER SLING FEMALE  10/23/2014    Performed by Levar Trevino M.D. at SURGERY SAME DAY [facility]   • CYSTOSCOPY  10/23/2014    Performed by Levar Trevino M.D. at SURGERY SAME DAY [facility]   • LUMBAR FUSION POSTERIOR  2012    Performed by Lauryn French M.D. at SURGERY [facility]   • LUMBAR DECOMPRESSION  2012    Performed by Lauryn French M.D. at SURGERY [facility]   • LUMBAR LAMINECTOMY DISKECTOMY  11/10/2011    Performed by LAURYN FRENCH at SURGERY [facility]   • LUMBAR LAMINECTOMY DISKECTOMY  2011    Performed by RAVI MONTENEGRO at SURGERY [facility]   • GYN SURGERY      abnormal PAP smears   • PRIMARY C SECTION      tubal ligation, , fallopian tube (one jewels)       Medications:   Current Outpatient Medications Ordered in Epic   Medication Sig Dispense Refill   • Lancets Lancets order: Lancets for True Metrix meter. Sig: use daily and prn ssx high or low sugar. 100 Each 11   • Blood Glucose Test Strips Test strips order: Test strips for True Metrix meter. Sig: use daily and prn ssx high or low sugar 100 Each 11   • ergocalciferol (DRISDOL) 65263 UNIT capsule TAKE 1 CAPSULE BY MOUTH EVERY WEEK FOR 12 WEEKS     • Blood Glucose Monitoring Suppl (BLOOD GLUCOSE MONITOR SYSTEM) w/Device Kit Use to test blood sugar as directed. 1 Kit 0   • metFORMIN ER (GLUCOPHAGE XR) 500 MG TABLET SR 24 HR Take 1 tablet by mouth every day. 30 tablet 3   • amLODIPine (NORVASC) 2.5 MG  Tab Take 1 tablet by mouth every day. 30 tablet 3   • spironolactone (ALDACTONE) 25 MG Tab Take 1 tablet by mouth 2 times a day. 60 tablet 3   • ibuprofen (MOTRIN) 800 MG Tab Take 1 tablet by mouth every 8 hours as needed. Take with food 90 tablet 5   • cyclobenzaprine (FLEXERIL) 10 mg Tab Take 1 Tablet by mouth 3 times a day. 90 tablet 5   • fluticasone-salmeterol (WIXELA INHUB) 250-50 MCG/DOSE AEROSOL POWDER, BREATH ACTIVATED Inhale 1 Puff every 12 hours. 60 Each 5   • fluticasone (FLONASE) 50 MCG/ACT nasal spray Administer 1 Spray into affected nostril(S) every day. 16 g 11   • Misc. Devices Misc Please provide patient with a large walker with a seat 1 Each 0   • albuterol 108 (90 Base) MCG/ACT Aero Soln inhalation aerosol Inhale 2 Puffs every 6 hours as needed for Shortness of Breath. 8.5 g 11   • cloNIDine (CATAPRES) 0.1 MG Tab      • gabapentin (NEURONTIN) 600 MG tablet Take 600 mg by mouth 3 times a day.     • INVEGA SUSTENNA 156 MG/ML Suspension Prefilled Syringe      • loratadine (CLARITIN) 10 MG Tab Take 10 mg by mouth every day.     • prazosin (MINIPRESS) 2 MG Cap Take 4 mg by mouth every bedtime. Hold is diastolic bp falls below 50     • mirtazapine (REMERON) 15 MG Tab Take 7.5 mg by mouth every evening.     • traZODone (DESYREL) 100 MG Tab Take 100 mg by mouth every evening. Indications: Trouble Sleeping     • Alcohol Swabs (ALCOHOL PADS) 70 % Pads Clean skin prior to checking blood sugar daily and prn ssx high or low sugar (Patient not taking: Reported on 3/30/2021) 100 Each 11   • nicotine (NICODERM) 14 MG/24HR PATCH 24 HR Place 1 Patch on the skin every 24 hours. (Patient not taking: Reported on 3/30/2021) 30 Patch 1   • ofloxacin otic sol (FLOXIN OTIC) 0.3 % Solution Administer 5 Drops into affected ear(s) every day.     • acetaminophen (TYLENOL) 325 MG Tab Take 650 mg by mouth every four hours as needed for Mild Pain.       No current Epic-ordered facility-administered medications on file.        Allergies: Contrast media with iodine [iodine], Shellfish allergy, and Sulfa drugs    Social History:  Social History     Socioeconomic History   • Marital status: Legally      Spouse name: Not on file   • Number of children: Not on file   • Years of education: Not on file   • Highest education level: Not on file   Occupational History   • Not on file   Tobacco Use   • Smoking status: Current Every Day Smoker     Packs/day: 0.50     Years: 15.00     Pack years: 7.50     Types: Cigarettes   • Smokeless tobacco: Never Used   Substance and Sexual Activity   • Alcohol use: No     Comment: X 13 years   • Drug use: Not Currently     Comment: last use March 17, 2020   • Sexual activity: Yes     Partners: Male   Other Topics Concern   • Not on file   Social History Narrative   • Not on file     Social Determinants of Health     Financial Resource Strain:    • Difficulty of Paying Living Expenses:    Food Insecurity:    • Worried About Running Out of Food in the Last Year:    • Ran Out of Food in the Last Year:    Transportation Needs:    • Lack of Transportation (Medical):    • Lack of Transportation (Non-Medical):    Physical Activity:    • Days of Exercise per Week:    • Minutes of Exercise per Session:    Stress:    • Feeling of Stress :    Social Connections:    • Frequency of Communication with Friends and Family:    • Frequency of Social Gatherings with Friends and Family:    • Attends Mosque Services:    • Active Member of Clubs or Organizations:    • Attends Club or Organization Meetings:    • Marital Status:    Intimate Partner Violence:    • Fear of Current or Ex-Partner:    • Emotionally Abused:    • Physically Abused:    • Sexually Abused:        Family History:  Family History   Problem Relation Age of Onset   • Heart Disease Mother    • Diabetes Mother    • Lung Disease Mother    • Alcohol/Drug Father    • Diabetes Father    • Diabetes Sister    • Diabetes Brother    • Suicide Attempts  Maternal Aunt    • Diabetes Maternal Grandmother    • Diabetes Maternal Grandfather    • Diabetes Paternal Grandmother    • Diabetes Paternal Grandfather    • Suicide Attempts Maternal Aunt    • Cancer Maternal Aunt         elijah nd stomach, unsure of primary           Objective:   Vitals:  /82   Wt (!) 170 kg (375 lb)   Body mass index is 50.86 kg/m². (Goal BM I>18 <25)    Physical Exam:   GENERAL: This is a well developed female, sitting comfortably in no acute distress.  HEENT: The head is normocephalic and atraumatic. The extra-ocular movements are  intact. The sclerae are anicteric. There is no conjunctival pallor.  PULMONARY: Breathing is even, unlabored, and symmetric.  GASTROINTESTINAL: The abdomen is soft, obese, and non-distended. There is tenderness to palpation at right rectus abdominus.  GENITOURINARY: Normal appearing external female genitalia.  On speculum exam vagina is pale pink with hypertrophy of tissues surrounding mid urethral sling.  Tender hypertrophic approximately 1 cm mass at suburethra just distal to sling.  Sling is palpable and very tender along its entirety.  Vaginal cuff is intact with some laxity of support.  No masses at vaginal cuff.  Vaginal cuff is Pap.  Apices are visible.  Vaginal canal is foreshortened.  Digital exam demonstrates tenderness to palpation as follows:  suburethral 0/10 anterior vaginal wall/bladder base 0/10    posterior vaginal wall/rectum 0/10  right obturator internus 8/10 left obturator internus 8/10  right levator ani 8/10 left levator ani 8/10, palpation along mid urethral sling is 10 out of 10, tenderness at vaginal cuff 5/10, unable to appreciate uterosacral ligaments, no adnexal masses able to be appreciated but exam limited by body habitus.  MUSCULOSKELETAL: There is no spinous process, paraspinous muscle, or costo-  vertebral angle tenderness to palpation. Bilateral lower extremities have full range of  motion and muscle strength testing is 5/5  for hip flexion, external rotation and internal  rotation.  NEUROLOGIC: Cranial nerves II-XII are grossly intact. No sensory deficits are  appreciated in the bilateral lower extremities.  PSYCHIATRIC: Thought content is linear. Memory is intact to both recent and remote  events.       Assessment/Plan:   Cira Ferguson is a 40 y.o.  female who presents for:    1. History of cervical dysplasia  THINPREP PAP WITH HPV   2. History of hysterectomy  THINPREP PAP WITH HPV   3. Pelvic pain  REFERRAL TO PHYSICAL THERAPY   4. Mixed stress and urge incontinence  REFERRAL TO PHYSICAL THERAPY   5. Dyspareunia in female  REFERRAL TO PHYSICAL THERAPY     #History of cervical dysplasia.  Will attempt to get records from Dr. Trevino in regards to what type of dysplasia patient had prior to her hysterectomy.  Discussed with patient today that if she had significant dysplasia prior to hysterectomy she should have been counseled on continuing her Pap tests following surgery.  Vaginal Pap is performed today as precaution.  #Hypertrophic tissues at mid urethral sling and significant tenderness along mid urethral sling.  Advised use of vaginal estrogen cream in hopes to aid in this healing and decrease the tenderness.  Also advised pelvic floor physical therapy for all of her pain complaints.  She is amenable and understands reason for this recommendation.  #Mixed urinary incontinence.  Discussed complexity of this in the setting of prior sling procedure.  Discussed possibility of cutting the sling or having another sling placed however advised against this as first step.  Advised first trying pelvic floor physical therapy, vaginal estrogen cream, and behavioral modifications.  Patient is amenable to these first steps.  Referral has been placed and behavioral modifications were discussed in detail.  #Follow up.  RTC in 3 months to follow-up on how pelvic floor physical therapy is going and all above-noted issues or sooner if  concerns or questions arise.    Patient was seen for 45 minutes of which > 50% of appointment time was spent on face-to-face counseling and coordination of care regarding the above.

## 2021-03-30 NOTE — NON-PROVIDER
Patient here c/o problems since DANIELE    LMP DANIELE  BCM DANIELE  Last mammogram if applicable 2/2021 Normal  [phone] (home)   Pharmacy confirmed.

## 2021-03-31 DIAGNOSIS — Z87.410 HISTORY OF CERVICAL DYSPLASIA: ICD-10-CM

## 2021-03-31 DIAGNOSIS — Z90.710 HISTORY OF HYSTERECTOMY: ICD-10-CM

## 2021-03-31 LAB
CYTOLOGY REG CYTOL: ABNORMAL
HPV HR 12 DNA CVX QL NAA+PROBE: NEGATIVE
HPV16 DNA SPEC QL NAA+PROBE: NEGATIVE
HPV18 DNA SPEC QL NAA+PROBE: POSITIVE
SPECIMEN SOURCE: ABNORMAL

## 2021-04-02 ENCOUNTER — PATIENT MESSAGE (OUTPATIENT)
Dept: OBGYN | Facility: CLINIC | Age: 41
End: 2021-04-02

## 2021-04-02 ENCOUNTER — TELEPHONE (OUTPATIENT)
Dept: MEDICAL GROUP | Facility: MEDICAL CENTER | Age: 41
End: 2021-04-02

## 2021-04-02 DIAGNOSIS — B97.7 HPV (HUMAN PAPILLOMA VIRUS) INFECTION: ICD-10-CM

## 2021-04-02 PROBLEM — A59.9 TRICHOMONIASIS: Status: ACTIVE | Noted: 2021-04-02

## 2021-04-02 RX ORDER — METRONIDAZOLE 500 MG/1
500 TABLET ORAL ONCE
Qty: 4 TABLET | Refills: 0 | Status: SHIPPED | OUTPATIENT
Start: 2021-04-02 | End: 2021-04-02

## 2021-04-02 NOTE — TELEPHONE ENCOUNTER
VOICEMAIL  1. Caller Name:Cira Ferguson                        Call Back Number: [phone] (home)       2. Message: pt left voicemail regarding to discuss test result with alyce.    3. Patient approves office to leave a detailed voicemail/MyChart message: yes and no

## 2021-04-04 DIAGNOSIS — A59.9 TRICHIMONIASIS: ICD-10-CM

## 2021-04-04 RX ORDER — METRONIDAZOLE 500 MG/1
500 TABLET ORAL 2 TIMES DAILY
Qty: 14 TABLET | Refills: 0 | Status: SHIPPED | OUTPATIENT
Start: 2021-04-04 | End: 2021-04-11

## 2021-04-08 ENCOUNTER — TELEPHONE (OUTPATIENT)
Dept: OBGYN | Facility: CLINIC | Age: 41
End: 2021-04-08

## 2021-04-08 NOTE — TELEPHONE ENCOUNTER
----- Message from Jayashree Srivastava D.O. sent at 4/4/2021 11:40 AM PDT -----  Please call patient and make sure she got this message:    Hi Cira,    The cells on your pap test are normal but you did test positive for HPV which you have likely had for years.  We will just need to repeat a pap in one year because of this.  On your pap, also it shows you have an infection with trichomonas which is a bacteria that can be transmitted sexually.  It is important you take antibiotic treatment for this.  I have sent the antibiotic into your pharmacy.  You will need to take it twice a day for 7 days.  Please refrain from alcohol while taking this antibiotic as it can make you very sick if you drink while on it.  Your partner needs to be treated as well.  You need to not have intercourse until both you and your partner are treated and it has been 7 days since you both completed treatment.        4/8/2021 1259 Left message for pt to call back regarding pap results.  4/9/2021 0922 Left message for pt to call back regarding pap results on home and mobil phone #s.   4/12/2021 0989 Left message for pt to call back regarding pap results.   4/15/2021 Pt has not return call. JASONI placed in chart and letter sent today.

## 2021-04-08 NOTE — LETTER
April 15, 2021          Dear Cira Ferguson,      Please call us regarding your care.  One of the nurses is available to speak with you Monday through Friday, 8 a.m. to 4 p.m.    Please call us at [phone]; thank you for your prompt attention.        Sincerely,    Alexia Villalpando R.N.    Electronically Signed

## 2021-04-13 ENCOUNTER — TELEPHONE (OUTPATIENT)
Dept: MEDICAL GROUP | Facility: MEDICAL CENTER | Age: 41
End: 2021-04-13

## 2021-04-26 ENCOUNTER — TELEPHONE (OUTPATIENT)
Dept: MEDICAL GROUP | Facility: MEDICAL CENTER | Age: 41
End: 2021-04-26

## 2021-04-26 NOTE — TELEPHONE ENCOUNTER
1. Caller Name: Cira Ferguson                        Call Back Number: [phone] (home)         How would the patient prefer to be contacted with a response: Phone call do NOT leave a detailed message    LVM for patient to call back to reschedule appointment

## 2021-04-29 ENCOUNTER — TELEPHONE (OUTPATIENT)
Dept: MEDICAL GROUP | Facility: MEDICAL CENTER | Age: 41
End: 2021-04-29

## 2021-04-29 NOTE — TELEPHONE ENCOUNTER
Pt missed last DM visit with the pharmacist  2nd call to reschedule  Letter sent to roderick Soliz, Clinical Pharmacist, CDE, CACP

## 2021-04-29 NOTE — LETTER
April 29, 2021        Cira Ferguson  [address] [id number]  [place] [address]        Dear Cira Ferguson ,    We have been unsuccessful in our attempts to contact you regarding your Diabetest Clinical Pharmacist referral.  Please contact us if you would like to schedule an appointment for help managing your Diabetes.    Please contact our clinic so we may assist you.  We are open Monday-Friday 8 am until 5 pm.  You may reach our Service at [phone].        Sincerely,       Rufus Solano, PharmD, [facility] Center[facility]  [facility]  Outpatient Ambulatory Care Service                                  Haylie Soliz

## 2021-06-11 ENCOUNTER — HOSPITAL ENCOUNTER (EMERGENCY)
Dept: HOSPITAL 8 - ED | Age: 41
Discharge: HOME | End: 2021-06-11
Payer: MEDICAID

## 2021-06-11 VITALS — WEIGHT: 293 LBS | BODY MASS INDEX: 41.02 KG/M2 | HEIGHT: 71 IN

## 2021-06-11 VITALS — DIASTOLIC BLOOD PRESSURE: 129 MMHG | SYSTOLIC BLOOD PRESSURE: 179 MMHG

## 2021-06-11 DIAGNOSIS — Z76.0: ICD-10-CM

## 2021-06-11 DIAGNOSIS — F17.200: ICD-10-CM

## 2021-06-11 DIAGNOSIS — R00.0: ICD-10-CM

## 2021-06-11 DIAGNOSIS — F15.129: Primary | ICD-10-CM

## 2021-06-11 DIAGNOSIS — G89.29: ICD-10-CM

## 2021-06-11 DIAGNOSIS — Z90.710: ICD-10-CM

## 2021-06-11 DIAGNOSIS — Z72.9: ICD-10-CM

## 2021-06-11 LAB
ALBUMIN SERPL-MCNC: 3 G/DL (ref 3.4–5)
ALP SERPL-CCNC: 66 U/L (ref 45–117)
ALT SERPL-CCNC: 18 U/L (ref 12–78)
ANION GAP SERPL CALC-SCNC: 8 MMOL/L (ref 5–15)
BASOPHILS # BLD AUTO: 0.1 X10^3/UL (ref 0–0.1)
BASOPHILS NFR BLD AUTO: 1 % (ref 0–1)
BILIRUB SERPL-MCNC: 0.3 MG/DL (ref 0.2–1)
CALCIUM SERPL-MCNC: 8.6 MG/DL (ref 8.5–10.1)
CHLORIDE SERPL-SCNC: 110 MMOL/L (ref 98–107)
CREAT SERPL-MCNC: 0.8 MG/DL (ref 0.55–1.02)
EOSINOPHIL # BLD AUTO: 0.3 X10^3/UL (ref 0–0.4)
EOSINOPHIL NFR BLD AUTO: 3 % (ref 1–7)
ERYTHROCYTE [DISTWIDTH] IN BLOOD BY AUTOMATED COUNT: 13.5 % (ref 9.6–15.2)
LYMPHOCYTES # BLD AUTO: 2.7 X10^3/UL (ref 1–3.4)
LYMPHOCYTES NFR BLD AUTO: 29 % (ref 22–44)
MCH RBC QN AUTO: 30.1 PG (ref 27–34.8)
MCHC RBC AUTO-ENTMCNC: 33.9 G/DL (ref 32.4–35.8)
MONOCYTES # BLD AUTO: 0.5 X10^3/UL (ref 0.2–0.8)
MONOCYTES NFR BLD AUTO: 5 % (ref 2–9)
NEUTROPHILS # BLD AUTO: 5.7 X10^3/UL (ref 1.8–6.8)
NEUTROPHILS NFR BLD AUTO: 62 % (ref 42–75)
PLATELET # BLD AUTO: 255 X10^3/UL (ref 130–400)
PMV BLD AUTO: 7.9 FL (ref 7.4–10.4)
PROT SERPL-MCNC: 6.5 G/DL (ref 6.4–8.2)
RBC # BLD AUTO: 4.48 X10^6/UL (ref 3.82–5.3)
VANCOMYCIN TROUGH SERPL-MCNC: 4.2 MG/DL (ref 2.8–20)

## 2021-06-11 PROCEDURE — 85025 COMPLETE CBC W/AUTO DIFF WBC: CPT

## 2021-06-11 PROCEDURE — G0480 DRUG TEST DEF 1-7 CLASSES: HCPCS

## 2021-06-11 PROCEDURE — 80053 COMPREHEN METABOLIC PANEL: CPT

## 2021-06-11 PROCEDURE — 36415 COLL VENOUS BLD VENIPUNCTURE: CPT

## 2021-06-11 PROCEDURE — 84703 CHORIONIC GONADOTROPIN ASSAY: CPT

## 2021-06-11 PROCEDURE — 80320 DRUG SCREEN QUANTALCOHOLS: CPT

## 2021-06-11 PROCEDURE — 80299 QUANTITATIVE ASSAY DRUG: CPT

## 2021-06-11 PROCEDURE — 99283 EMERGENCY DEPT VISIT LOW MDM: CPT

## 2021-06-11 PROCEDURE — 80329 ANALGESICS NON-OPIOID 1 OR 2: CPT

## 2021-06-11 NOTE — NUR
LATE ENTRY FOR 1530: PT GIVEN UPDATE WITH POC, PT STATES SHE IS SEEKING MEDICAL 
DETOX FROM [facility]. MD CHILDERS INFORMED. PER MD DETOX AT THIS FACILITY NOT 
OFFERED OR INDICATED. PT COUNSELED. VERBALIZES UNDERSTANDING. PT A&O, RESPS 
EVEN AND UNLABORED, NADN.

## 2021-06-11 NOTE — NUR
AT TIME OF DISCHARGE, PT BECAME UPSET THAT SHE IS NOT ADMITTED FOR MEDICAL 
DETOX OF [facility]. PT IS A&O, RESPS EVEN AND UNLABORED, NO TREMORS, NO N/V, NO 
DIAPHORESIS NOTED DURING ED STAY. MD CHILDERS NOTIFIED. PT EDUCATED PT THAT 
MEDICAL DETOX IS NOT PROVIDED AT THIS FACILITY. PT VERBALIZES UNDERSTANDING. PT 
REQUESTS SNACK, SNACK AND WATER PROVIDED, PT TOLERATED WELL. PT STATES SHE 
WISHES TO DETOX AT OUTPATIENT FACILITY, [facility]. PT CALLED [facility] WHO STATES THEY WOULD ACCEPT HER AS AN OUTPATIENT. PT 
PROVIDED WITH CAB VOUCHER. PT AMBULATORY TO [place] DESK WITH ALL BELONGINGS, 
INCLUDING SUIT CASE. GAIT STEADY. CAB CALLED BY REGISTRATION STAFF. yes

## 2021-06-11 NOTE — NUR
AT DC, PT PROVIDED WITH SCRIPT FOR REQUESTED HOME MED REFILL, GIVEN PCP 
REFERRAL FOR FOLLOW UP. PT VERBALIZES UNDERSTANDING OF DC INSTRUCTIONS.

## 2021-06-11 NOTE — NUR
LATE ENTRY D/T PATIENT CARE: MED LIST OBTAINED FROM PT FOR MD TO WRITE DC 
SCRIPT. PT A&O, RESPS EVEN AND UNLABORED, NO COMPLAINT. BP AND SPO2 MONITORS IN 
PLACE.

## 2021-08-17 NOTE — ED NOTES
Assumed care of patient, Assisted pt to the bathroom, pt has a steady gait. SOB with exertion.    [Nl] : Endocrine [Cough] : cough [Shortness of Breath] : shortness of breath [Rash] : rash [Allergy Shiners] : allergy shiners [Rhinorrhea] : rhinorrhea [Nasal Congestion] : nasal congestion [Frequent URIs] : no frequent upper respiratory infections [Snoring] : no snoring [Apnea] : no apnea [Restlessness] : no restlessness [Daytime Sleepiness] : no daytime sleepiness [Daytime Hyperactivity] : no daytime hyperactivity [Voice Changes] : no voice changes [Frequent Croup] : no frequent croup [Chronic Hoarseness] : no chronic hoarseness [Sinus Problems] : no sinus problems [Tinnitus] : no tinnitus [Recurrent Ear Infections] : no recurrent ear infections [Recurrent Sinus Infections] : no recurrent sinus infections [Heart Disease] : no heart disease [Edema] : no edema [Diaphoresis] : not diaphoretic [Chest Pain] : no chest pain  [Palpitations] : no palpitations [Orthopnea] : no orthopnea [Abnormal Heart Rhythm] : no abnormal heart rhythm [Pulmonary Hypertension] : pulmonary hypertension [Tachypnea] : not tachypneic [Wheezing] : no wheezing [Bronchitis] : no bronchitis [Pneumonia] : no pneumonia [Hemoptysis] : no hemoptysis [Chest Tightness] : no chest tightness [Pleuritic Pain] : no pleuritic pain [Nocturia] : no nocturia [Urgency] : no feelings of urinary urgency [Frequency] : no urinary frequency [Dysuria] : no dysuria [Birth Marks] : no birth marks [Eczema] : no ezcema [Skin Infections] : no skin infections [Urticaria] : no urticaria [Laryngeal Edema] : no laryngeal edema [Immunocompromised] : not immunocompromised [Angioedema] : no angioedema [Sleep Disturbances] : ~T no sleep disturbances [Hyperactive] : no hyperactive behavior [Depression] : no depression [Anxiety] : no anxiety

## 2021-08-26 DIAGNOSIS — I10 ESSENTIAL HYPERTENSION: ICD-10-CM

## 2021-08-26 RX ORDER — AMLODIPINE BESYLATE 2.5 MG/1
2.5 TABLET ORAL
Qty: 30 TABLET | Refills: 3 | Status: SHIPPED | OUTPATIENT
Start: 2021-08-26 | End: 2022-01-10 | Stop reason: SDUPTHER

## 2021-10-08 ENCOUNTER — HOSPITAL ENCOUNTER (EMERGENCY)
Facility: MEDICAL CENTER | Age: 41
End: 2021-10-08
Attending: EMERGENCY MEDICINE
Payer: MEDICAID

## 2021-10-08 ENCOUNTER — APPOINTMENT (OUTPATIENT)
Dept: RADIOLOGY | Facility: MEDICAL CENTER | Age: 41
End: 2021-10-08
Attending: EMERGENCY MEDICINE
Payer: MEDICAID

## 2021-10-08 VITALS
OXYGEN SATURATION: 98 % | HEIGHT: 72 IN | TEMPERATURE: 97 F | WEIGHT: 293 LBS | RESPIRATION RATE: 14 BRPM | HEART RATE: 80 BPM | SYSTOLIC BLOOD PRESSURE: 140 MMHG | BODY MASS INDEX: 39.68 KG/M2 | DIASTOLIC BLOOD PRESSURE: 86 MMHG

## 2021-10-08 DIAGNOSIS — M54.50 ACUTE MIDLINE LOW BACK PAIN WITHOUT SCIATICA: ICD-10-CM

## 2021-10-08 DIAGNOSIS — R20.0 RIGHT LEG NUMBNESS: ICD-10-CM

## 2021-10-08 DIAGNOSIS — R29.898 WEAKNESS OF RIGHT LEG: ICD-10-CM

## 2021-10-08 LAB
ALBUMIN SERPL BCP-MCNC: 3.9 G/DL (ref 3.2–4.9)
ALBUMIN/GLOB SERPL: 1.5 G/DL
ALP SERPL-CCNC: 64 U/L (ref 30–99)
ALT SERPL-CCNC: 10 U/L (ref 2–50)
ANION GAP SERPL CALC-SCNC: 10 MMOL/L (ref 7–16)
APPEARANCE UR: ABNORMAL
AST SERPL-CCNC: 9 U/L (ref 12–45)
BACTERIA #/AREA URNS HPF: ABNORMAL /HPF
BASOPHILS # BLD AUTO: 0.8 % (ref 0–1.8)
BASOPHILS # BLD: 0.07 K/UL (ref 0–0.12)
BILIRUB SERPL-MCNC: 0.2 MG/DL (ref 0.1–1.5)
BILIRUB UR QL STRIP.AUTO: NEGATIVE
BUN SERPL-MCNC: 13 MG/DL (ref 8–22)
CALCIUM SERPL-MCNC: 8.9 MG/DL (ref 8.5–10.5)
CHLORIDE SERPL-SCNC: 102 MMOL/L (ref 96–112)
CO2 SERPL-SCNC: 25 MMOL/L (ref 20–33)
COLOR UR: YELLOW
CREAT SERPL-MCNC: 0.8 MG/DL (ref 0.5–1.4)
EOSINOPHIL # BLD AUTO: 0.49 K/UL (ref 0–0.51)
EOSINOPHIL NFR BLD: 5.5 % (ref 0–6.9)
EPI CELLS #/AREA URNS HPF: ABNORMAL /HPF
ERYTHROCYTE [DISTWIDTH] IN BLOOD BY AUTOMATED COUNT: 43 FL (ref 35.9–50)
GLOBULIN SER CALC-MCNC: 2.6 G/DL (ref 1.9–3.5)
GLUCOSE SERPL-MCNC: 98 MG/DL (ref 65–99)
GLUCOSE UR STRIP.AUTO-MCNC: NEGATIVE MG/DL
HCT VFR BLD AUTO: 40.8 % (ref 37–47)
HGB BLD-MCNC: 13.5 G/DL (ref 12–16)
IMM GRANULOCYTES # BLD AUTO: 0.03 K/UL (ref 0–0.11)
IMM GRANULOCYTES NFR BLD AUTO: 0.3 % (ref 0–0.9)
KETONES UR STRIP.AUTO-MCNC: NEGATIVE MG/DL
LEUKOCYTE ESTERASE UR QL STRIP.AUTO: ABNORMAL
LYMPHOCYTES # BLD AUTO: 3.53 K/UL (ref 1–4.8)
LYMPHOCYTES NFR BLD: 39.9 % (ref 22–41)
MCH RBC QN AUTO: 29.9 PG (ref 27–33)
MCHC RBC AUTO-ENTMCNC: 33.1 G/DL (ref 33.6–35)
MCV RBC AUTO: 90.5 FL (ref 81.4–97.8)
MICRO URNS: ABNORMAL
MONOCYTES # BLD AUTO: 0.54 K/UL (ref 0–0.85)
MONOCYTES NFR BLD AUTO: 6.1 % (ref 0–13.4)
NEUTROPHILS # BLD AUTO: 4.19 K/UL (ref 2–7.15)
NEUTROPHILS NFR BLD: 47.4 % (ref 44–72)
NITRITE UR QL STRIP.AUTO: NEGATIVE
NRBC # BLD AUTO: 0 K/UL
NRBC BLD-RTO: 0 /100 WBC
PH UR STRIP.AUTO: 5.5 [PH] (ref 5–8)
PLATELET # BLD AUTO: 241 K/UL (ref 164–446)
PMV BLD AUTO: 9.8 FL (ref 9–12.9)
POTASSIUM SERPL-SCNC: 4 MMOL/L (ref 3.6–5.5)
PROT SERPL-MCNC: 6.5 G/DL (ref 6–8.2)
PROT UR QL STRIP: NEGATIVE MG/DL
RBC # BLD AUTO: 4.51 M/UL (ref 4.2–5.4)
RBC # URNS HPF: ABNORMAL /HPF
RBC UR QL AUTO: NEGATIVE
SODIUM SERPL-SCNC: 137 MMOL/L (ref 135–145)
SP GR UR STRIP.AUTO: 1.02
UROBILINOGEN UR STRIP.AUTO-MCNC: 1 MG/DL
WBC # BLD AUTO: 8.9 K/UL (ref 4.8–10.8)
WBC #/AREA URNS HPF: ABNORMAL /HPF

## 2021-10-08 PROCEDURE — 80053 COMPREHEN METABOLIC PANEL: CPT

## 2021-10-08 PROCEDURE — 700111 HCHG RX REV CODE 636 W/ 250 OVERRIDE (IP): Performed by: EMERGENCY MEDICINE

## 2021-10-08 PROCEDURE — 81001 URINALYSIS AUTO W/SCOPE: CPT

## 2021-10-08 PROCEDURE — 96374 THER/PROPH/DIAG INJ IV PUSH: CPT

## 2021-10-08 PROCEDURE — 99284 EMERGENCY DEPT VISIT MOD MDM: CPT

## 2021-10-08 PROCEDURE — 36415 COLL VENOUS BLD VENIPUNCTURE: CPT

## 2021-10-08 PROCEDURE — 96375 TX/PRO/DX INJ NEW DRUG ADDON: CPT

## 2021-10-08 PROCEDURE — 85025 COMPLETE CBC W/AUTO DIFF WBC: CPT

## 2021-10-08 RX ORDER — HYDROMORPHONE HYDROCHLORIDE 1 MG/ML
1 INJECTION, SOLUTION INTRAMUSCULAR; INTRAVENOUS; SUBCUTANEOUS ONCE
Status: COMPLETED | OUTPATIENT
Start: 2021-10-08 | End: 2021-10-08

## 2021-10-08 RX ORDER — DIAZEPAM 5 MG/ML
5 INJECTION, SOLUTION INTRAMUSCULAR; INTRAVENOUS
Status: COMPLETED | OUTPATIENT
Start: 2021-10-08 | End: 2021-10-08

## 2021-10-08 RX ADMIN — DIAZEPAM 5 MG: 5 INJECTION, SOLUTION INTRAMUSCULAR; INTRAVENOUS at 17:26

## 2021-10-08 RX ADMIN — HYDROMORPHONE HYDROCHLORIDE 1 MG: 1 INJECTION, SOLUTION INTRAMUSCULAR; INTRAVENOUS; SUBCUTANEOUS at 15:25

## 2021-10-08 ASSESSMENT — FIBROSIS 4 INDEX: FIB4 SCORE: 0.91

## 2021-10-08 NOTE — ED TRIAGE NOTES
"Pt to triage, c/o back from mid thoracic to low back. Hx of back surgeries many years ago, states \" she knows she needs another. C/o lt leg weakness x a week with associated back pain   "

## 2021-10-08 NOTE — ED NOTES
IV established. Labs/urine sent to lab. Pt medicated per emar for 10/10 back pain. BP/spo2 monitoring in place. On 2L O2 by Nc for support following Rx

## 2021-10-09 NOTE — DISCHARGE PLANNING
Received call from [facility] that patient needs to transfer to [facility] for MRI. ( due to weight ) PCS completed as per [facility] and faxed to [facility] fax [phone].

## 2021-10-09 NOTE — ED NOTES
Report given to [facility] for transfer to [facility],  All belongings accounted for.

## 2021-10-09 NOTE — DISCHARGE PLANNING
[facility] completed for transfer to [facility]. Transfer packet complete.

## 2021-10-09 NOTE — ED NOTES
Pt updated on POC, transfer to [facility]- verbalizes understanding, denies other needs at this time

## 2021-11-11 ENCOUNTER — APPOINTMENT (OUTPATIENT)
Dept: RADIOLOGY | Facility: MEDICAL CENTER | Age: 41
End: 2021-11-11
Attending: EMERGENCY MEDICINE
Payer: MEDICAID

## 2021-11-11 ENCOUNTER — HOSPITAL ENCOUNTER (EMERGENCY)
Facility: MEDICAL CENTER | Age: 41
End: 2021-11-11
Attending: EMERGENCY MEDICINE
Payer: MEDICAID

## 2021-11-11 VITALS
SYSTOLIC BLOOD PRESSURE: 117 MMHG | HEART RATE: 87 BPM | OXYGEN SATURATION: 93 % | HEIGHT: 72 IN | RESPIRATION RATE: 20 BRPM | BODY MASS INDEX: 39.68 KG/M2 | DIASTOLIC BLOOD PRESSURE: 82 MMHG | WEIGHT: 293 LBS | TEMPERATURE: 97.4 F

## 2021-11-11 DIAGNOSIS — L02.91 ABSCESS: ICD-10-CM

## 2021-11-11 DIAGNOSIS — L03.317 CELLULITIS OF BUTTOCK: ICD-10-CM

## 2021-11-11 LAB
ALBUMIN SERPL BCP-MCNC: 3.7 G/DL (ref 3.2–4.9)
ALBUMIN/GLOB SERPL: 1.1 G/DL
ALP SERPL-CCNC: 75 U/L (ref 30–99)
ALT SERPL-CCNC: 14 U/L (ref 2–50)
ANION GAP SERPL CALC-SCNC: 14 MMOL/L (ref 7–16)
AST SERPL-CCNC: 16 U/L (ref 12–45)
BASOPHILS # BLD AUTO: 0.8 % (ref 0–1.8)
BASOPHILS # BLD: 0.08 K/UL (ref 0–0.12)
BILIRUB SERPL-MCNC: 0.2 MG/DL (ref 0.1–1.5)
BUN SERPL-MCNC: 8 MG/DL (ref 8–22)
CALCIUM SERPL-MCNC: 8.9 MG/DL (ref 8.4–10.2)
CHLORIDE SERPL-SCNC: 100 MMOL/L (ref 96–112)
CO2 SERPL-SCNC: 21 MMOL/L (ref 20–33)
CREAT SERPL-MCNC: 0.95 MG/DL (ref 0.5–1.4)
EOSINOPHIL # BLD AUTO: 0.39 K/UL (ref 0–0.51)
EOSINOPHIL NFR BLD: 3.7 % (ref 0–6.9)
ERYTHROCYTE [DISTWIDTH] IN BLOOD BY AUTOMATED COUNT: 41.4 FL (ref 35.9–50)
GLOBULIN SER CALC-MCNC: 3.4 G/DL (ref 1.9–3.5)
GLUCOSE SERPL-MCNC: 85 MG/DL (ref 65–99)
HCT VFR BLD AUTO: 43.1 % (ref 37–47)
HGB BLD-MCNC: 14.3 G/DL (ref 12–16)
IMM GRANULOCYTES # BLD AUTO: 0.02 K/UL (ref 0–0.11)
IMM GRANULOCYTES NFR BLD AUTO: 0.2 % (ref 0–0.9)
LACTATE BLD-SCNC: 1.6 MMOL/L (ref 0.5–2)
LYMPHOCYTES # BLD AUTO: 4.11 K/UL (ref 1–4.8)
LYMPHOCYTES NFR BLD: 38.6 % (ref 22–41)
MCH RBC QN AUTO: 29.6 PG (ref 27–33)
MCHC RBC AUTO-ENTMCNC: 33.2 G/DL (ref 33.6–35)
MCV RBC AUTO: 89.2 FL (ref 81.4–97.8)
MONOCYTES # BLD AUTO: 0.63 K/UL (ref 0–0.85)
MONOCYTES NFR BLD AUTO: 5.9 % (ref 0–13.4)
NEUTROPHILS # BLD AUTO: 5.41 K/UL (ref 2–7.15)
NEUTROPHILS NFR BLD: 50.8 % (ref 44–72)
NRBC # BLD AUTO: 0 K/UL
NRBC BLD-RTO: 0 /100 WBC
PLATELET # BLD AUTO: 272 K/UL (ref 164–446)
PMV BLD AUTO: 9.5 FL (ref 9–12.9)
POTASSIUM SERPL-SCNC: 3.9 MMOL/L (ref 3.6–5.5)
PROT SERPL-MCNC: 7.1 G/DL (ref 6–8.2)
RBC # BLD AUTO: 4.83 M/UL (ref 4.2–5.4)
SODIUM SERPL-SCNC: 135 MMOL/L (ref 135–145)
WBC # BLD AUTO: 10.6 K/UL (ref 4.8–10.8)

## 2021-11-11 PROCEDURE — A9270 NON-COVERED ITEM OR SERVICE: HCPCS | Performed by: EMERGENCY MEDICINE

## 2021-11-11 PROCEDURE — 71045 X-RAY EXAM CHEST 1 VIEW: CPT

## 2021-11-11 PROCEDURE — 87040 BLOOD CULTURE FOR BACTERIA: CPT | Mod: 91

## 2021-11-11 PROCEDURE — 700105 HCHG RX REV CODE 258: Performed by: EMERGENCY MEDICINE

## 2021-11-11 PROCEDURE — 80053 COMPREHEN METABOLIC PANEL: CPT

## 2021-11-11 PROCEDURE — 96374 THER/PROPH/DIAG INJ IV PUSH: CPT

## 2021-11-11 PROCEDURE — 99284 EMERGENCY DEPT VISIT MOD MDM: CPT

## 2021-11-11 PROCEDURE — 83605 ASSAY OF LACTIC ACID: CPT

## 2021-11-11 PROCEDURE — 96375 TX/PRO/DX INJ NEW DRUG ADDON: CPT

## 2021-11-11 PROCEDURE — 85025 COMPLETE CBC W/AUTO DIFF WBC: CPT

## 2021-11-11 PROCEDURE — 700102 HCHG RX REV CODE 250 W/ 637 OVERRIDE(OP): Performed by: EMERGENCY MEDICINE

## 2021-11-11 PROCEDURE — 700117 HCHG RX CONTRAST REV CODE 255: Performed by: EMERGENCY MEDICINE

## 2021-11-11 PROCEDURE — 72193 CT PELVIS W/DYE: CPT | Mod: MG

## 2021-11-11 PROCEDURE — 700111 HCHG RX REV CODE 636 W/ 250 OVERRIDE (IP): Performed by: EMERGENCY MEDICINE

## 2021-11-11 RX ORDER — AMOXICILLIN 250 MG/1
1000 CAPSULE ORAL ONCE
Status: COMPLETED | OUTPATIENT
Start: 2021-11-11 | End: 2021-11-11

## 2021-11-11 RX ORDER — DOXYCYCLINE 100 MG/1
100 TABLET ORAL ONCE
Status: COMPLETED | OUTPATIENT
Start: 2021-11-11 | End: 2021-11-11

## 2021-11-11 RX ORDER — ONDANSETRON 2 MG/ML
4 INJECTION INTRAMUSCULAR; INTRAVENOUS ONCE
Status: COMPLETED | OUTPATIENT
Start: 2021-11-11 | End: 2021-11-11

## 2021-11-11 RX ORDER — SODIUM CHLORIDE 9 MG/ML
1000 INJECTION, SOLUTION INTRAVENOUS ONCE
Status: COMPLETED | OUTPATIENT
Start: 2021-11-11 | End: 2021-11-11

## 2021-11-11 RX ORDER — AMOXICILLIN 500 MG/1
1000 CAPSULE ORAL 2 TIMES DAILY
Qty: 28 CAPSULE | Refills: 0 | Status: SHIPPED | OUTPATIENT
Start: 2021-11-11 | End: 2021-11-18

## 2021-11-11 RX ORDER — DIPHENHYDRAMINE HYDROCHLORIDE 50 MG/ML
25 INJECTION INTRAMUSCULAR; INTRAVENOUS ONCE
Status: COMPLETED | OUTPATIENT
Start: 2021-11-11 | End: 2021-11-11

## 2021-11-11 RX ORDER — METHYLPREDNISOLONE SODIUM SUCCINATE 125 MG/2ML
125 INJECTION, POWDER, LYOPHILIZED, FOR SOLUTION INTRAMUSCULAR; INTRAVENOUS ONCE
Status: COMPLETED | OUTPATIENT
Start: 2021-11-11 | End: 2021-11-11

## 2021-11-11 RX ORDER — DOXYCYCLINE 100 MG/1
100 CAPSULE ORAL 2 TIMES DAILY
Qty: 14 CAPSULE | Refills: 0 | Status: SHIPPED | OUTPATIENT
Start: 2021-11-11 | End: 2021-11-11 | Stop reason: SDUPTHER

## 2021-11-11 RX ORDER — LORAZEPAM 2 MG/ML
1 INJECTION INTRAMUSCULAR ONCE
Status: DISCONTINUED | OUTPATIENT
Start: 2021-11-11 | End: 2021-11-11

## 2021-11-11 RX ORDER — SODIUM CHLORIDE, SODIUM LACTATE, POTASSIUM CHLORIDE, CALCIUM CHLORIDE 600; 310; 30; 20 MG/100ML; MG/100ML; MG/100ML; MG/100ML
1000 INJECTION, SOLUTION INTRAVENOUS ONCE
Status: COMPLETED | OUTPATIENT
Start: 2021-11-11 | End: 2021-11-11

## 2021-11-11 RX ORDER — MORPHINE SULFATE 10 MG/ML
6 INJECTION, SOLUTION INTRAMUSCULAR; INTRAVENOUS ONCE
Status: COMPLETED | OUTPATIENT
Start: 2021-11-11 | End: 2021-11-11

## 2021-11-11 RX ORDER — DOXYCYCLINE 100 MG/1
100 CAPSULE ORAL 2 TIMES DAILY
Qty: 14 CAPSULE | Refills: 0 | Status: SHIPPED | OUTPATIENT
Start: 2021-11-11 | End: 2021-11-18

## 2021-11-11 RX ORDER — LORAZEPAM 2 MG/ML
2 INJECTION INTRAMUSCULAR ONCE
Status: COMPLETED | OUTPATIENT
Start: 2021-11-11 | End: 2021-11-11

## 2021-11-11 RX ADMIN — MORPHINE SULFATE 6 MG: 10 INJECTION INTRAVENOUS at 20:57

## 2021-11-11 RX ADMIN — AMOXICILLIN 1000 MG: 250 CAPSULE ORAL at 23:44

## 2021-11-11 RX ADMIN — SODIUM CHLORIDE, POTASSIUM CHLORIDE, SODIUM LACTATE AND CALCIUM CHLORIDE 1000 ML: 600; 310; 30; 20 INJECTION, SOLUTION INTRAVENOUS at 20:57

## 2021-11-11 RX ADMIN — METHYLPREDNISOLONE SODIUM SUCCINATE 125 MG: 125 INJECTION, POWDER, FOR SOLUTION INTRAMUSCULAR; INTRAVENOUS at 20:57

## 2021-11-11 RX ADMIN — IOHEXOL 100 ML: 350 INJECTION, SOLUTION INTRAVENOUS at 22:48

## 2021-11-11 RX ADMIN — DOXYCYCLINE 100 MG: 100 TABLET, FILM COATED ORAL at 23:44

## 2021-11-11 RX ADMIN — ONDANSETRON 4 MG: 2 INJECTION INTRAMUSCULAR; INTRAVENOUS at 20:56

## 2021-11-11 RX ADMIN — LORAZEPAM 2 MG: 2 INJECTION INTRAMUSCULAR; INTRAVENOUS at 22:58

## 2021-11-11 RX ADMIN — SODIUM CHLORIDE 1000 ML: 9 INJECTION, SOLUTION INTRAVENOUS at 20:57

## 2021-11-11 RX ADMIN — DIPHENHYDRAMINE HYDROCHLORIDE 25 MG: 50 INJECTION INTRAMUSCULAR; INTRAVENOUS at 22:00

## 2021-11-11 ASSESSMENT — FIBROSIS 4 INDEX: FIB4 SCORE: 0.5

## 2021-11-12 NOTE — ED TRIAGE NOTES
"Chief Complaint   Patient presents with   • Abscess     R side, pt states \"boil popped 2 days, now its infected\"; c/o pain, fever, drainage     Pulse (!) 127   Temp (!) 35.6 °C (96 °F) (Temporal)   Resp 20   Ht 1.829 m (6')   Wt (!) 146 kg (321 lb 14 oz)   LMP 05/01/2015   SpO2 99%   BMI 43.65 kg/m²     Pt BIB spouse for above concern, pt in \"a lot of pain, everything hurts right now, these pants touching it hurts, its on fire\"    Has this patient been vaccinated for COVID - YES  If not, would they like to be vaccinated while in the ER if eligible?  n/a  Would the patient like to speak with the ERP about the possibility of receiving the COVID vaccine today before making a decision? n/a    "

## 2021-11-12 NOTE — ED NOTES
Attempted to try an IV on pt x2, unsuccessful. Pt states she was an IV drug user and a former chemo patient

## 2021-11-12 NOTE — ED PROVIDER NOTES
"ED Provider Note    CHIEF COMPLAINT  Chief Complaint   Patient presents with   • Abscess     R side, pt states \"boil popped 2 days, now its infected\"; c/o pain, fever, drainage       [facility]    Primary care provider: BARBARA Smith  Means of arrival: POV  History obtained from: Patient and partner  History limited by: Patient difficult historian    Cira Ferguson is a 41 y.o. female who presents with concerns for a boil on her buttock.  Concerned of infection.  Patient has a complex medical history including distant cervical cancer she also has type 2 diabetes.  Distant history of drug abuse denies any currently.  Also has a history of psychiatric illness.  Apparently 2 days ago the patient noticed a boil on her right buttock, now today she noticed that it popped and has drained and she is worried it is infected.  No aggravating or alleviating factors noted other than she has very sharp pain at the site of her \"boil\", and reports some subjective fevers at home.  She also reports drainage from the wound that is foul-smelling.  She has had boils in the past.  She takes no blood thinners, but has numerous medications including psychiatric diabetes and hypertensive meds.  Not actively on chemotherapy.  Denies any falls or injuries or trauma and denies any active drug abuse.    REVIEW OF SYSTEMS  Constitutional: Positive for subjective fevers at home but no shaking chills.  HENT: Negative for rhinorrhea or sore throat.    Respiratory: Negative for cough or shortness of breath.    Cardiovascular: Negative for chest pain or palpitations.   Gastrointestinal: Negative for nausea, vomiting, or abdominal pain.   Genitourinary: Negative for dysuria or flank pain.   Musculoskeletal: Negative for back pain or joint pain.   Skin: Neg positive for wound to right buttock with draining boil.  Also has a small area of redness to her right forearm.  See HPI for further details. All other systems are negative.     PAST " MEDICAL HISTORY   has a past medical history of Asthma, Bipolar disorder ([facility]), Boil (2/2/2018), Cervical cancer ([facility]) (2015), Diabetes ([facility]), DVT (deep venous thrombosis) ([facility]) (2013), Fall, GERD (gastroesophageal reflux disease), Hepatitis C, Hypertension, Hypoxia (10/13/2020), Infectious disease, IV drug abuse ([facility]), Leukocytosis (1/25/2020), Muscle disorder, PE (pulmonary thromboembolism) ([facility]) (2013), Psychiatric disorder, Schizoaffective disorder ([facility]), and Schizophrenia ([facility]).    PAST FAMILY HISTORY  Family History   Problem Relation Age of Onset   • Heart Disease Mother    • Diabetes Mother    • Lung Disease Mother    • Alcohol/Drug Father    • Diabetes Father    • Diabetes Sister    • Diabetes Brother    • Suicide Attempts Maternal Aunt    • Diabetes Maternal Grandmother    • Diabetes Maternal Grandfather    • Diabetes Paternal Grandmother    • Diabetes Paternal Grandfather    • Suicide Attempts Maternal Aunt    • Cancer Maternal Aunt         brraina nd stomach, unsure of primary       SOCIAL HISTORY  Social History     Tobacco Use   • Smoking status: Current Every Day Smoker     Packs/day: 0.50     Years: 15.00     Pack years: 7.50     Types: Cigarettes   • Smokeless tobacco: Never Used   Vaping Use   • Vaping Use: Never used   Substance and Sexual Activity   • Alcohol use: Not Currently   • Drug use: Not Currently   • Sexual activity: Yes     Partners: Male       SURGICAL HISTORY   has a past surgical history that includes lumbar laminectomy diskectomy (5/17/2011); gyn surgery; primary c section; lumbar laminectomy diskectomy (11/10/2011); lumbar fusion posterior (11/5/2012); lumbar decompression (11/5/2012); vaginal hysterectomy scope total (10/23/2014); salpingectomy (10/23/2014); anterior and posterior repair (10/23/2014); enterocele repair (10/23/2014); vaginal suspension (10/23/2014); bladder sling female (10/23/2014); cystoscopy (10/23/2014); and laminotomy,lumbar disk,1 intrsp (Left,  1/23/2020).    CURRENT MEDICATIONS  Home Medications     Reviewed by Sima Fuentes R.N. (Registered Nurse) on 11/11/21 at 1844  Med List Status: Not Addressed   Medication Last Dose Status   acetaminophen (TYLENOL) 325 MG Tab  Active   albuterol 108 (90 Base) MCG/ACT Aero Soln inhalation aerosol  Active   Alcohol Swabs (ALCOHOL PADS) 70 % Pads  Active   amLODIPine (NORVASC) 2.5 MG Tab  Active   Blood Glucose Monitoring Suppl (BLOOD GLUCOSE MONITOR SYSTEM) w/Device Kit  Active   Blood Glucose Test Strips  Active   cloNIDine (CATAPRES) 0.1 MG Tab  Active   cyclobenzaprine (FLEXERIL) 10 mg Tab  Active   ergocalciferol (DRISDOL) 65104 UNIT capsule  Active   fluticasone (FLONASE) 50 MCG/ACT nasal spray  Active   fluticasone-salmeterol (WIXELA INHUB) 250-50 MCG/DOSE AEROSOL POWDER, BREATH ACTIVATED  Active   gabapentin (NEURONTIN) 600 MG tablet  Active   ibuprofen (MOTRIN) 800 MG Tab  Active   INVEGA SUSTENNA 156 MG/ML Suspension Prefilled Syringe  Active   Lancets  Active   loratadine (CLARITIN) 10 MG Tab  Active   metFORMIN ER (GLUCOPHAGE XR) 500 MG TABLET SR 24 HR  Active   mirtazapine (REMERON) 15 MG Tab  Active   Misc. Devices Misc  Active   nicotine (NICODERM) 14 MG/24HR PATCH 24 HR  Active   ofloxacin otic sol (FLOXIN OTIC) 0.3 % Solution  Active   prazosin (MINIPRESS) 2 MG Cap  Active   spironolactone (ALDACTONE) 25 MG Tab  Active   traZODone (DESYREL) 100 MG Tab  Active                ALLERGIES  Allergies   Allergen Reactions   • Contrast Media With Iodine [Iodine]    • Shellfish Allergy      Shellfish & Seafood   • Sulfa Drugs      Unknown rxn       PHYSICAL EXAM  VITAL SIGNS: /82   Pulse 87   Temp 36.3 °C (97.4 °F) (Temporal)   Resp 20   Ht 1.829 m (6')   Wt (!) 146 kg (321 lb 14 oz)   LMP 05/01/2015   SpO2 93%   BMI 43.65 kg/m²    Pulse ox interpretation: On room air, I interpret this pulse ox as normal.  Constitutional: Writhing on the stretcher in significant distress.  HEENT:  Normocephalic, atraumatic. Posterior pharynx clear, mucous membranes dry.  Eyes:  EOMI. Normal sclerae.  Neck: Supple, nontender.  Chest/Pulmonary: Diminished to ausculation bilaterally, no wheezes or rhonchi.  Cardiovascular: Tachycardic rate, regular rhythm, no murmur.   Abdomen: Soft, nontender; no rebound, guarding, or masses.  Back: No CVA or midline tenderness.   Musculoskeletal: No deformity or edema.  Neuro: Clear speech, no focal weakness or asymmetry.  Psych: Very fidgety and distressed but cooperative.  Skin: Small irritated appearing follicle to the right dorsum of forearm without any fluctuance, there is an open wound with erythema about 2 x 2 cm to the right buttock with no palpable fluctuance or active drainage.  Skin otherwise is warm and dry.      DIAGNOSTIC STUDIES / PROCEDURES    LABS & EKG  Results for orders placed or performed during the hospital encounter of 11/11/21   CBC WITH DIFFERENTIAL   Result Value Ref Range    WBC 10.6 4.8 - 10.8 K/uL    RBC 4.83 4.20 - 5.40 M/uL    Hemoglobin 14.3 12.0 - 16.0 g/dL    Hematocrit 43.1 37.0 - 47.0 %    MCV 89.2 81.4 - 97.8 fL    MCH 29.6 27.0 - 33.0 pg    MCHC 33.2 (L) 33.6 - 35.0 g/dL    RDW 41.4 35.9 - 50.0 fL    Platelet Count 272 164 - 446 K/uL    MPV 9.5 9.0 - 12.9 fL    Neutrophils-Polys 50.80 44.00 - 72.00 %    Lymphocytes 38.60 22.00 - 41.00 %    Monocytes 5.90 0.00 - 13.40 %    Eosinophils 3.70 0.00 - 6.90 %    Basophils 0.80 0.00 - 1.80 %    Immature Granulocytes 0.20 0.00 - 0.90 %    Nucleated RBC 0.00 /100 WBC    Neutrophils (Absolute) 5.41 2.00 - 7.15 K/uL    Lymphs (Absolute) 4.11 1.00 - 4.80 K/uL    Monos (Absolute) 0.63 0.00 - 0.85 K/uL    Eos (Absolute) 0.39 0.00 - 0.51 K/uL    Baso (Absolute) 0.08 0.00 - 0.12 K/uL    Immature Granulocytes (abs) 0.02 0.00 - 0.11 K/uL    NRBC (Absolute) 0.00 K/uL   COMP METABOLIC PANEL   Result Value Ref Range    Sodium 135 135 - 145 mmol/L    Potassium 3.9 3.6 - 5.5 mmol/L    Chloride 100 96 - 112 mmol/L     Co2 21 20 - 33 mmol/L    Anion Gap 14.0 7.0 - 16.0    Glucose 85 65 - 99 mg/dL    Bun 8 8 - 22 mg/dL    Creatinine 0.95 0.50 - 1.40 mg/dL    Calcium 8.9 8.4 - 10.2 mg/dL    AST(SGOT) 16 12 - 45 U/L    ALT(SGPT) 14 2 - 50 U/L    Alkaline Phosphatase 75 30 - 99 U/L    Total Bilirubin 0.2 0.1 - 1.5 mg/dL    Albumin 3.7 3.2 - 4.9 g/dL    Total Protein 7.1 6.0 - 8.2 g/dL    Globulin 3.4 1.9 - 3.5 g/dL    A-G Ratio 1.1 g/dL   BLOOD CULTURE    Specimen: Peripheral; Blood   Result Value Ref Range    Significant Indicator NEG     Source BLD     Site PERIPHERAL     Culture Result       No Growth  Note: Blood cultures are incubated for 5 days and  are monitored continuously.Positive blood cultures  are called to the RN and reported as soon as  they are identified.  Blood culture testing and Gram stain, if indicated, are  performed at [facility], [address] [place] [address].  Positive blood cultures are  sent to [facility], [address] [place] [address], for organism identification and  susceptibility testing.     BLOOD CULTURE    Specimen: Peripheral; Blood   Result Value Ref Range    Significant Indicator NEG     Source BLD     Site PERIPHERAL     Culture Result       No Growth  Note: Blood cultures are incubated for 5 days and  are monitored continuously.Positive blood cultures  are called to the RN and reported as soon as  they are identified.  Blood culture testing and Gram stain, if indicated, are  performed at [facility], [address] [place] [address].[place], [place].  Positive blood cultures are  sent to [facility], [address] [place] [address], for organism identification and  susceptibility testing.     LACTIC ACID   Result Value Ref Range    Lactic Acid 1.6 0.5 - 2.0 mmol/L   ESTIMATED GFR   Result Value Ref Range    GFR If African American >60 >60 mL/min/1.73 m 2    GFR If Non African American >60 >60 mL/min/1.73 m 2        RADIOLOGY  CT-PELVIS WITH   Final Result         1.  No enhancing fluid collection identified to indicate soft tissue abscess   2.  Diverticulosis      DX-CHEST-PORTABLE (1 VIEW)   Final Result         1.  No acute cardiopulmonary disease.          COURSE & MEDICAL DECISION MAKING    This is a 41 y.o. female who presents with draining boil to right buttock.    Differential Diagnosis includes but is not limited to:  Sepsis, abscess, malignancy, cellulitis, drug abuse    ED Course:  41-year-old female with above concerning presentation.  Sepsis protocol initiated.  Thankfully labs are reassuring.  Vital signs improved with fluids and parenteral medications.  Scan shows nothing acute.  Plan oral antibiotics, PCP follow-up, return immediately if any worse.  Patient and partner understand agree with plan of care.    Medications   lactated ringers (LR) bolus (0 mL Intravenous Stopped 11/11/21 2338)   morphine (pf) 10 mg/mL injection 6 mg (6 mg Intravenous Given 11/11/21 2057)   ondansetron (ZOFRAN) syringe/vial injection 4 mg (4 mg Intravenous Given 11/11/21 2056)   NS (BOLUS) infusion 1,000 mL (0 mL Intravenous Stopped 11/11/21 2338)   diphenhydrAMINE (BENADRYL) injection 25 mg (25 mg Intravenous Given 11/11/21 2200)   methylPREDNISolone sod succ (SOLU-MEDROL) 125 MG injection 125 mg (125 mg Intravenous Given 11/11/21 2057)   iohexol (OMNIPAQUE) 350 mg/mL (100 mL Intravenous Given 11/11/21 2248)   LORazepam (ATIVAN) injection 2 mg (2 mg Intravenous Given 11/11/21 2258)   doxycycline monohydrate (ADOXA) tablet 100 mg (100 mg Oral Given 11/11/21 2344)   amoxicillin (AMOXIL) capsule 1,000 mg (1,000 mg Oral Given 11/11/21 2344)       FINAL IMPRESSION  1. Abscess    2. Cellulitis of buttock        PRESCRIPTIONS  Discharge Medication List as of 11/11/2021 11:39 PM      START taking these medications    Details   amoxicillin (AMOXIL) 500 MG Cap Take 2 Capsules by mouth 2 times a day for 7 days., Disp-28 Capsule, R-0,  Normal             FOLLOW UP  MARÍA Smith.  [address]  [place] [address]  [phone]    Schedule an appointment as soon as possible for a visit in 1 day      [facility], Emergency Dept  [address]  [place] [id number] [phone]  Today  If you have ANY new or worse symptoms!      -DISCHARGE-       Pertinent Labs & Imaging studies reviewed and verified by myself, as well as nursing notes and the patient's past medical, family, and social histories (See chart for details).    Portions of this record were made with voice recognition software.  Despite my review, spelling/grammar/context errors may still remain.  Interpretation of this chart should be taken in this context.    Electronically signed by Stef Pagna M.D. on 11/16/2021 at 1:14 PM.

## 2021-11-16 LAB
BACTERIA BLD CULT: NORMAL
BACTERIA BLD CULT: NORMAL
SIGNIFICANT IND 70042: NORMAL
SIGNIFICANT IND 70042: NORMAL
SITE SITE: NORMAL
SITE SITE: NORMAL
SOURCE SOURCE: NORMAL
SOURCE SOURCE: NORMAL

## 2022-01-10 DIAGNOSIS — M54.42 ACUTE MIDLINE LOW BACK PAIN WITH LEFT-SIDED SCIATICA: ICD-10-CM

## 2022-01-10 DIAGNOSIS — E11.40 TYPE 2 DIABETES MELLITUS WITH DIABETIC NEUROPATHY, WITHOUT LONG-TERM CURRENT USE OF INSULIN (HCC): ICD-10-CM

## 2022-01-10 DIAGNOSIS — I10 ESSENTIAL HYPERTENSION: ICD-10-CM

## 2022-01-12 PROCEDURE — RXMED WILLOW AMBULATORY MEDICATION CHARGE: Performed by: NURSE PRACTITIONER

## 2022-01-12 RX ORDER — AMLODIPINE BESYLATE 2.5 MG/1
2.5 TABLET ORAL
Qty: 30 TABLET | Refills: 5 | Status: SHIPPED | OUTPATIENT
Start: 2022-01-12 | End: 2022-05-12

## 2022-01-12 RX ORDER — CYCLOBENZAPRINE HCL 10 MG
10 TABLET ORAL 3 TIMES DAILY PRN
Qty: 90 TABLET | Refills: 5 | Status: SHIPPED | OUTPATIENT
Start: 2022-01-12

## 2022-01-12 RX ORDER — IBUPROFEN 800 MG/1
800 TABLET ORAL EVERY 8 HOURS PRN
Qty: 90 TABLET | Refills: 5 | Status: SHIPPED | OUTPATIENT
Start: 2022-01-12

## 2022-01-12 RX ORDER — DIPHENHYDRAMINE HYDROCHLORIDE 25 MG/1
CAPSULE, LIQUID FILLED ORAL
Qty: 1 KIT | Refills: 0 | Status: SHIPPED | OUTPATIENT
Start: 2022-01-12

## 2022-01-12 RX ORDER — METFORMIN HYDROCHLORIDE 500 MG/1
500 TABLET, EXTENDED RELEASE ORAL DAILY
Qty: 30 TABLET | Refills: 5 | Status: SHIPPED | OUTPATIENT
Start: 2022-01-12 | End: 2022-04-20

## 2022-01-14 ENCOUNTER — PHARMACY VISIT (OUTPATIENT)
Dept: PHARMACY | Facility: MEDICAL CENTER | Age: 42
End: 2022-01-14
Payer: COMMERCIAL

## 2022-01-26 ENCOUNTER — TELEMEDICINE (OUTPATIENT)
Dept: MEDICAL GROUP | Facility: MEDICAL CENTER | Age: 42
End: 2022-01-26
Attending: INTERNAL MEDICINE
Payer: MEDICAID

## 2022-01-26 VITALS — RESPIRATION RATE: 20 BRPM | HEIGHT: 72 IN | BODY MASS INDEX: 39.68 KG/M2 | WEIGHT: 293 LBS

## 2022-01-26 DIAGNOSIS — L03.317 ABSCESS AND CELLULITIS OF GLUTEAL REGION: ICD-10-CM

## 2022-01-26 DIAGNOSIS — L02.31 ABSCESS AND CELLULITIS OF GLUTEAL REGION: ICD-10-CM

## 2022-01-26 PROCEDURE — 99212 OFFICE O/P EST SF 10 MIN: CPT | Performed by: INTERNAL MEDICINE

## 2022-01-26 ASSESSMENT — FIBROSIS 4 INDEX: FIB4 SCORE: 0.66

## 2022-01-26 NOTE — PROGRESS NOTES
Virtual Visit: Established Patient   This visit was conducted via Zoom using secure and encrypted videoconferencing technology.   The patient was in a private location in the state [facility].    The patient's identity was confirmed and verbal consent was obtained for this virtual visit.    Subjective:   CC: abscess    Cira Ferguson is a 41 y.o. female presenting for evaluation and management of:    Abscess and cellulitis of gluteal region  She reports developing a painful abscess on her left lateral gluteus a few days ago.  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  She states that they gave her a prescription for clindamycin and also did a wound culture.  She was notified today that the culture was positive for MRSA.  She started the clindamycin 300 mg 3 times daily yesterday.  She denies any acute worsening in symptoms but is reporting significant pain in the area.  States that it is draining a little bit and she is needing to change the gauze about 3 times a day but that the drainage is serous.  She denies fevers, chills.  States that they told her she should follow-up with wound care but she was unable to do this with her current job.  She thinks the wound is about 2 cm deep but she is not packing it.    ROS   As above in HPI    Current medicines (including changes today)  Current Outpatient Medications   Medication Sig Dispense Refill   • Blood Glucose Monitoring Suppl (BLOOD GLUCOSE MONITOR SYSTEM) w/Device Kit Use to test blood sugar as directed. 1 Kit 0   • metFORMIN ER (GLUCOPHAGE XR) 500 MG TABLET SR 24 HR Take 1 tablet by mouth every day. 30 Tablet 5   • ibuprofen (MOTRIN) 800 MG Tab Take 1 tablet by mouth every 8 hours as needed. Take with food 90 Tablet 5   • cyclobenzaprine (FLEXERIL) 10 mg Tab Take 1 Tablet by mouth 3 times a day as needed for Moderate Pain. 90 Tablet 5   • amLODIPine (NORVASC) 2.5 MG Tab Take 1 Tablet by  mouth every day. 30 Tablet 5   • Lancets Use daily and as needed for symptoms of high or low sugar. 100 Each 11   • glucose blood strip Use daily and as needed for signs/symptoms for high or low sugar 100 Strip 11   • Alcohol Swabs (ALCOHOL PADS) 70 % Pads Clean skin prior to checking blood sugar daily and as needed for symptoms of high or low sugar 100 Each 11   • spironolactone (ALDACTONE) 25 MG Tab Take 1 Tablet by mouth 2 times a day. 60 Tablet 5   • ergocalciferol (DRISDOL) 20562 UNIT capsule TAKE 1 CAPSULE BY MOUTH EVERY WEEK FOR 12 WEEKS     • fluticasone-salmeterol (WIXELA INHUB) 250-50 MCG/DOSE AEROSOL POWDER, BREATH ACTIVATED Inhale 1 Puff every 12 hours. 60 Each 5   • nicotine (NICODERM) 14 MG/24HR PATCH 24 HR Place 1 Patch on the skin every 24 hours. (Patient not taking: Reported on 3/30/2021) 30 Patch 1   • fluticasone (FLONASE) 50 MCG/ACT nasal spray Administer 1 Spray into affected nostril(S) every day. 16 g 11   • Misc. Devices Misc Please provide patient with a large walker with a seat 1 Each 0   • albuterol 108 (90 Base) MCG/ACT Aero Soln inhalation aerosol Inhale 2 Puffs every 6 hours as needed for Shortness of Breath. 8.5 g 11   • cloNIDine (CATAPRES) 0.1 MG Tab      • gabapentin (NEURONTIN) 600 MG tablet Take 600 mg by mouth 3 times a day.     • INVEGA SUSTENNA 156 MG/ML Suspension Prefilled Syringe      • loratadine (CLARITIN) 10 MG Tab Take 10 mg by mouth every day.     • ofloxacin otic sol (FLOXIN OTIC) 0.3 % Solution Administer 5 Drops into affected ear(s) every day.     • acetaminophen (TYLENOL) 325 MG Tab Take 650 mg by mouth every four hours as needed for Mild Pain.     • prazosin (MINIPRESS) 2 MG Cap Take 4 mg by mouth every bedtime. Hold is diastolic bp falls below 50     • mirtazapine (REMERON) 15 MG Tab Take 7.5 mg by mouth every evening.     • traZODone (DESYREL) 100 MG Tab Take 100 mg by mouth every evening. Indications: Trouble Sleeping       No current facility-administered  medications for this visit.       Patient Active Problem List    Diagnosis Date Noted   • Abscess and cellulitis of gluteal region 01/26/2022   • Trichomoniasis 04/02/2021   • HPV (human papilloma virus) infection 04/02/2021   • Encounter to establish care 03/24/2021   • Type 2 diabetes mellitus with neurologic complication, without long-term current use of insulin ([facility]) 03/24/2021   • Essential hypertension 03/24/2021   • Ear infection 03/24/2021   • Hirsutism 03/24/2021   • Dyspnea on effort 10/13/2020   • Pulmonary nodule 10/13/2020   • Tobacco dependence 05/23/2018   • Skin disease, infectious 05/15/2018   • History of DVT (deep vein thrombosis) 05/01/2017   • Morbid obesity with BMI of 45.0-49.9, adult ([facility]) 05/01/2017   • Schizoaffective disorder ([facility]) 05/01/2017   • Incontinence in female 05/01/2017   • Pain associated with defecation 05/01/2017   • Chest pain 12/06/2014   • Numbness 03/07/2014   • Lumbar nerve root compression 11/05/2012   • Back pain 11/03/2012   • Gait disturbance 11/03/2012   • Weakness of right leg 11/03/2012   • Neurogenic pain 05/23/2011   • Asthma 05/23/2011   • Polycystic ovary syndrome 05/23/2011        Objective:   Resp 20   Ht 1.829 m (6')   Wt (!) 146 kg (321 lb)   LMP 05/01/2015   BMI 43.54 kg/m²     Physical Exam:  Constitutional: Alert, no distress, well-groomed.  Skin: No rashes in visible areas, approx 2 cm x 1 cm circular wound over right lateral gluteus with rim of induration   Eye: Round. Conjunctiva clear, lids normal. No icterus.   ENMT: Lips pink without lesions, good dentition, moist mucous membranes. Phonation normal.  Neck: No masses, no thyromegaly. Moves freely without pain.  Respiratory: Unlabored respiratory effort, no cough or audible wheeze  Psych: Alert and oriented x3, normal affect and mood.     Assessment and Plan:   The following treatment plan was discussed:     1. Abscess and cellulitis of gluteal region  She is on appropriate antibiotic therapy.   We discussed signs and symptoms of worsening infection.  Discussed that ideally, would want to evaluate her wound in clinic to determine if packing is needed however it sounds like she could benefit from packing the wound.  Unfortunately, she does not have the supplies to do this currently and she is out of state.  She has a follow-up with her PCP tomorrow for other issues.  They can readdress if symptoms are worsening.  For now, instructed her to continue her clindamycin and regular dressing changes.    Follow-up: Return if symptoms worsen or fail to improve.

## 2022-01-26 NOTE — ASSESSMENT & PLAN NOTE
She reports developing a painful abscess on her left lateral gluteus a few days ago.  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  She states that they gave her a prescription for clindamycin and also did a wound culture.  She was notified today that the culture was positive for MRSA.  She started the clindamycin 300 mg 3 times daily yesterday.  She denies any acute worsening in symptoms but is reporting significant pain in the area.  States that it is draining a little bit and she is needing to change the gauze about 3 times a day but that the drainage is serous.  She denies fevers, chills.  States that they told her she should follow-up with wound care but she was unable to do this with her current job.  She thinks the wound is about 2 cm deep but she is not packing it.

## 2022-02-28 PROCEDURE — RXMED WILLOW AMBULATORY MEDICATION CHARGE: Performed by: NURSE PRACTITIONER

## 2022-03-08 ENCOUNTER — PHARMACY VISIT (OUTPATIENT)
Dept: PHARMACY | Facility: MEDICAL CENTER | Age: 42
End: 2022-03-08
Payer: COMMERCIAL

## 2022-04-20 ENCOUNTER — TELEMEDICINE (OUTPATIENT)
Dept: MEDICAL GROUP | Facility: MEDICAL CENTER | Age: 42
End: 2022-04-20
Attending: INTERNAL MEDICINE
Payer: MEDICAID

## 2022-04-20 VITALS — BODY MASS INDEX: 41.95 KG/M2 | RESPIRATION RATE: 16 BRPM | HEIGHT: 70 IN | WEIGHT: 293 LBS

## 2022-04-20 DIAGNOSIS — E11.40 TYPE 2 DIABETES MELLITUS WITH DIABETIC NEUROPATHY, WITHOUT LONG-TERM CURRENT USE OF INSULIN (HCC): ICD-10-CM

## 2022-04-20 PROCEDURE — 99213 OFFICE O/P EST LOW 20 MIN: CPT | Performed by: INTERNAL MEDICINE

## 2022-04-20 RX ORDER — HYDROXYZINE PAMOATE 25 MG/1
CAPSULE ORAL
COMMUNITY
End: 2022-06-02

## 2022-04-20 RX ORDER — GABAPENTIN 800 MG/1
800 TABLET ORAL
COMMUNITY
End: 2022-06-02

## 2022-04-20 RX ORDER — METFORMIN HYDROCHLORIDE 500 MG/1
500 TABLET, EXTENDED RELEASE ORAL
COMMUNITY
End: 2022-04-20

## 2022-04-20 RX ORDER — GABAPENTIN 600 MG/1
TABLET ORAL
COMMUNITY
End: 2022-06-02

## 2022-04-20 RX ORDER — TRAZODONE HYDROCHLORIDE 150 MG/1
TABLET ORAL
COMMUNITY
End: 2022-06-02

## 2022-04-20 RX ORDER — PRAZOSIN HYDROCHLORIDE 5 MG/1
5 CAPSULE ORAL NIGHTLY
COMMUNITY
End: 2022-06-02

## 2022-04-20 RX ORDER — GABAPENTIN 400 MG/1
800 CAPSULE ORAL
COMMUNITY

## 2022-04-20 RX ORDER — HYDROCHLOROTHIAZIDE 12.5 MG/1
CAPSULE, GELATIN COATED ORAL
COMMUNITY
End: 2022-06-02

## 2022-04-20 RX ORDER — MIRTAZAPINE 15 MG/1
TABLET, FILM COATED ORAL
COMMUNITY
End: 2022-06-02

## 2022-04-20 RX ORDER — CLONIDINE HYDROCHLORIDE 0.1 MG/1
0.1 TABLET ORAL
COMMUNITY
End: 2022-06-02

## 2022-04-20 RX ORDER — CYCLOBENZAPRINE HCL 10 MG
TABLET ORAL
COMMUNITY

## 2022-04-20 RX ORDER — AMLODIPINE BESYLATE 10 MG/1
12.5 TABLET ORAL DAILY
COMMUNITY
End: 2022-05-12 | Stop reason: SDUPTHER

## 2022-04-20 RX ORDER — CLINDAMYCIN HYDROCHLORIDE 300 MG/1
CAPSULE ORAL
COMMUNITY
Start: 2022-01-24 | End: 2022-06-02

## 2022-04-20 RX ORDER — TRAZODONE HYDROCHLORIDE 100 MG/1
TABLET ORAL
COMMUNITY
End: 2022-06-02

## 2022-04-20 RX ORDER — GABAPENTIN 300 MG/1
300 CAPSULE ORAL
COMMUNITY

## 2022-04-20 RX ORDER — AMLODIPINE BESYLATE 2.5 MG/1
2.5 TABLET ORAL DAILY
COMMUNITY
End: 2022-05-12

## 2022-04-20 RX ORDER — OMEPRAZOLE 20 MG/1
CAPSULE, DELAYED RELEASE ORAL
COMMUNITY
End: 2022-06-02

## 2022-04-20 RX ORDER — PROPRANOLOL HYDROCHLORIDE 10 MG/1
10 TABLET ORAL DAILY
COMMUNITY
Start: 2021-12-06 | End: 2022-06-02

## 2022-04-20 ASSESSMENT — FIBROSIS 4 INDEX: FIB4 SCORE: 0.66

## 2022-04-20 NOTE — ASSESSMENT & PLAN NOTE
She states that about 5 days ago she started to feel unwell with increased headaches, sweating, increased thirst, and difficulty urinating.  She started to check her blood sugars and noticed she was having some readings as high as 500.  She last checked her blood sugar this morning and states that it was 190 while she was fasting.  She has been taking metformin extended release 500 mg daily and she historically had a fairly low A1c.  She has not had any recent labs.  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  She is not sure when she will return.

## 2022-04-21 NOTE — PROGRESS NOTES
Virtual Visit: Established Patient   This visit was conducted via Zoom using secure and encrypted videoconferencing technology.   The patient was in their home in the [facility] [place].    The patient's identity was confirmed and verbal consent was obtained for this virtual visit.     Subjective:   CC: No chief complaint on file.      Cira Ferguson is a 41 y.o. female presenting for evaluation and management of:    Type 2 diabetes mellitus with neurologic complication, without long-term current use of insulin (HCC)  She states that about 5 days ago she started to feel unwell with increased headaches, sweating, increased thirst, and difficulty urinating.  She started to check her blood sugars and noticed she was having some readings as high as 500.  She last checked her blood sugar this morning and states that it was 190 while she was fasting.  She has been taking metformin extended release 500 mg daily and she historically had a fairly low A1c.  She has not had any recent labs.  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  She is not sure when she will return.           Current medicines (including changes today)  Current Outpatient Medications   Medication Sig Dispense Refill   • propranolol (INDERAL) 10 MG Tab Take 10 mg by mouth every day.     • metformin (GLUCOPHAGE) 1000 MG tablet Take 1 Tablet by mouth 2 times a day with meals. 60 Tablet 3   • gabapentin (NEURONTIN) 600 MG tablet Take 1 Tablet by mouth 3 times a day. 90 Tablet 5   • prazosin (MINIPRESS) 5 MG Cap Take 5 mg by mouth every evening.     • mirtazapine (REMERON) 15 MG Tab 1 tablet at bedtime     • gabapentin (NEURONTIN) 600 MG tablet 1 tablet     • gabapentin (NEURONTIN) 800 MG tablet Take 800 mg by mouth.     • gabapentin (NEURONTIN) 400 MG Cap Take 800 mg by mouth.     • gabapentin (NEURONTIN) 300 MG Cap Take 300 mg by mouth.     • cyclobenzaprine (FLEXERIL) 10 mg Tab 1 tablet     • amLODIPine (NORVASC) 2.5 MG Tab  Take 2.5 mg by mouth every day.     • amLODIPine (NORVASC) 10 MG Tab Take 12.5 mg by mouth every day.     • omeprazole (PRILOSEC) 20 MG delayed-release capsule 1 capsule     • hydrOXYzine pamoate (VISTARIL) 25 MG Cap 1 capsule     • hydrochlorothiazide (MICROZIDE) 12.5 MG capsule 1 capsule in the morning     • cloNIDine (CATAPRES) 0.1 MG Tab Take 0.1 mg by mouth.     • clindamycin (CLEOCIN) 300 MG Cap TAKE ONE CAPSULE BY MOUTH THREE TIMES DAILY UNTIL GONE     • TRAZODONE & DIET MANAGE PROD PO Take 825 mg by mouth.     • spironolactone-hydroCHLOROthiazide 5-5 mg/mL oral suspension Take  by mouth.     • traZODone (DESYREL) 150 MG Tab Take  by mouth.     • traZODone (DESYREL) 100 MG Tab 1 tablet at bedtime     • Blood Glucose Monitoring Suppl (BLOOD GLUCOSE MONITOR SYSTEM) w/Device Kit Use to test blood sugar as directed. 1 Kit 0   • ibuprofen (MOTRIN) 800 MG Tab Take 1 tablet by mouth every 8 hours as needed. Take with food 90 Tablet 5   • cyclobenzaprine (FLEXERIL) 10 mg Tab Take 1 Tablet by mouth 3 times a day as needed for Moderate Pain. 90 Tablet 5   • amLODIPine (NORVASC) 2.5 MG Tab Take 1 Tablet by mouth every day. 30 Tablet 5   • Lancets Use daily and as needed for symptoms of high or low sugar. 100 Each 11   • glucose blood strip Use daily and as needed for signs/symptoms for high or low sugar 100 Strip 11   • Alcohol Swabs (ALCOHOL PADS) 70 % Pads Clean skin prior to checking blood sugar daily and as needed for symptoms of high or low sugar 100 Each 11   • spironolactone (ALDACTONE) 25 MG Tab Take 1 Tablet by mouth 2 times a day. 60 Tablet 5   • loratadine (CLARITIN) 10 MG Tab Take 10 mg by mouth every day.     • acetaminophen (TYLENOL) 325 MG Tab Take 650 mg by mouth every four hours as needed for Mild Pain.     • prazosin (MINIPRESS) 2 MG Cap Take 4 mg by mouth every bedtime. Hold is diastolic bp falls below 50     • mirtazapine (REMERON) 15 MG Tab Take 7.5 mg by mouth every evening.     • traZODone  "(DESYREL) 100 MG Tab Take 100 mg by mouth every evening. Indications: Trouble Sleeping       No current facility-administered medications for this visit.       Patient Active Problem List    Diagnosis Date Noted   • Abscess and cellulitis of gluteal region 01/26/2022   • Trichomoniasis 04/02/2021   • HPV (human papilloma virus) infection 04/02/2021   • Encounter to establish care 03/24/2021   • Type 2 diabetes mellitus with neurologic complication, without long-term current use of insulin ([facility]) 03/24/2021   • Essential hypertension 03/24/2021   • Ear infection 03/24/2021   • Hirsutism 03/24/2021   • Dyspnea on effort 10/13/2020   • Pulmonary nodule 10/13/2020   • Tobacco dependence 05/23/2018   • Skin disease, infectious 05/15/2018   • History of DVT (deep vein thrombosis) 05/01/2017   • Morbid obesity with BMI of 45.0-49.9, adult ([facility]) 05/01/2017   • Schizoaffective disorder ([facility]) 05/01/2017   • Incontinence in female 05/01/2017   • Pain associated with defecation 05/01/2017   • Chest pain 12/06/2014   • Numbness 03/07/2014   • Lumbar nerve root compression 11/05/2012   • Back pain 11/03/2012   • Gait disturbance 11/03/2012   • Weakness of right leg 11/03/2012   • Neurogenic pain 05/23/2011   • Asthma 05/23/2011   • Polycystic ovary syndrome 05/23/2011        Objective:   Resp 16   Ht 1.778 m (5' 10\")   Wt (!) 146 kg (321 lb)   LMP 05/01/2015   BMI 46.06 kg/m²     Physical Exam:  Constitutional: Alert, no distress, well-groomed.  Skin: No rashes in visible areas.  Eye: Round. Conjunctiva clear, lids normal. No icterus.   ENMT: Lips pink without lesions, good dentition, moist mucous membranes. Phonation normal.  Neck: No masses, no thyromegaly. Moves freely without pain.  Respiratory: Unlabored respiratory effort, no cough or audible wheeze  Psych: Alert and oriented x3, normal affect and mood.     Assessment and Plan:   The following treatment plan was discussed:     1. Type 2 diabetes mellitus with diabetic " neuropathy, without long-term current use of insulin (HCC)  Suspect her A1c has increased significantly.  Because she is out of state and paying cash for meds, we opted to maximize her metformin but she would be a great candidate for a GLP-1 agonist to help with weight loss.  We discussed obtaining an A1c as soon as she is back in [place] and that we can add additional medication as indicated.  Encouraged her to make a follow-up with her PCP as soon as she is able.  - metformin (GLUCOPHAGE) 1000 MG tablet; Take 1 Tablet by mouth 2 times a day with meals.  Dispense: 60 Tablet; Refill: 3  - HEMOGLOBIN A1C; Future        Follow-up: Return if symptoms worsen or fail to improve.

## 2022-05-09 ENCOUNTER — PHARMACY VISIT (OUTPATIENT)
Dept: PHARMACY | Facility: MEDICAL CENTER | Age: 42
End: 2022-05-09
Payer: COMMERCIAL

## 2022-05-09 DIAGNOSIS — I10 ESSENTIAL HYPERTENSION: ICD-10-CM

## 2022-05-09 PROCEDURE — RXMED WILLOW AMBULATORY MEDICATION CHARGE: Performed by: NURSE PRACTITIONER

## 2022-05-12 RX ORDER — AMLODIPINE BESYLATE 10 MG/1
10 TABLET ORAL DAILY
Qty: 30 TABLET | Refills: 5 | Status: SHIPPED | OUTPATIENT
Start: 2022-05-12 | End: 2022-07-05 | Stop reason: SDUPTHER

## 2022-05-12 RX ORDER — AMLODIPINE BESYLATE 2.5 MG/1
2.5 TABLET ORAL
Qty: 30 TABLET | Refills: 5 | OUTPATIENT
Start: 2022-05-12

## 2022-06-02 ENCOUNTER — OFFICE VISIT (OUTPATIENT)
Dept: MEDICAL GROUP | Facility: MEDICAL CENTER | Age: 42
End: 2022-06-02
Attending: INTERNAL MEDICINE
Payer: MEDICAID

## 2022-06-02 ENCOUNTER — HOSPITAL ENCOUNTER (OUTPATIENT)
Dept: LAB | Facility: MEDICAL CENTER | Age: 42
End: 2022-06-02
Attending: INTERNAL MEDICINE
Payer: MEDICAID

## 2022-06-02 ENCOUNTER — PHARMACY VISIT (OUTPATIENT)
Dept: PHARMACY | Facility: MEDICAL CENTER | Age: 42
End: 2022-06-02
Payer: COMMERCIAL

## 2022-06-02 VITALS
SYSTOLIC BLOOD PRESSURE: 124 MMHG | TEMPERATURE: 97.1 F | OXYGEN SATURATION: 96 % | DIASTOLIC BLOOD PRESSURE: 78 MMHG | RESPIRATION RATE: 16 BRPM | WEIGHT: 293 LBS | HEIGHT: 72 IN | BODY MASS INDEX: 39.68 KG/M2 | HEART RATE: 88 BPM

## 2022-06-02 DIAGNOSIS — G47.00 INSOMNIA, UNSPECIFIED TYPE: ICD-10-CM

## 2022-06-02 DIAGNOSIS — E11.40 TYPE 2 DIABETES MELLITUS WITH DIABETIC NEUROPATHY, WITHOUT LONG-TERM CURRENT USE OF INSULIN (HCC): ICD-10-CM

## 2022-06-02 DIAGNOSIS — L68.0 HIRSUTISM: ICD-10-CM

## 2022-06-02 DIAGNOSIS — S83.006D PATELLAR DISLOCATION, SUBSEQUENT ENCOUNTER: ICD-10-CM

## 2022-06-02 DIAGNOSIS — F41.9 ANXIETY: ICD-10-CM

## 2022-06-02 DIAGNOSIS — I10 ESSENTIAL HYPERTENSION: ICD-10-CM

## 2022-06-02 PROBLEM — H66.90 EAR INFECTION: Status: RESOLVED | Noted: 2021-03-24 | Resolved: 2022-06-02

## 2022-06-02 PROBLEM — L03.317 ABSCESS AND CELLULITIS OF GLUTEAL REGION: Status: RESOLVED | Noted: 2022-01-26 | Resolved: 2022-06-02

## 2022-06-02 PROBLEM — L02.31 ABSCESS AND CELLULITIS OF GLUTEAL REGION: Status: RESOLVED | Noted: 2022-01-26 | Resolved: 2022-06-02

## 2022-06-02 LAB
EST. AVERAGE GLUCOSE BLD GHB EST-MCNC: 126 MG/DL
HBA1C MFR BLD: 6 % (ref 4–5.6)

## 2022-06-02 PROCEDURE — RXMED WILLOW AMBULATORY MEDICATION CHARGE: Performed by: INTERNAL MEDICINE

## 2022-06-02 PROCEDURE — 36415 COLL VENOUS BLD VENIPUNCTURE: CPT

## 2022-06-02 PROCEDURE — RXMED WILLOW AMBULATORY MEDICATION CHARGE: Performed by: NURSE PRACTITIONER

## 2022-06-02 PROCEDURE — 99214 OFFICE O/P EST MOD 30 MIN: CPT | Performed by: INTERNAL MEDICINE

## 2022-06-02 PROCEDURE — 83036 HEMOGLOBIN GLYCOSYLATED A1C: CPT

## 2022-06-02 PROCEDURE — 99213 OFFICE O/P EST LOW 20 MIN: CPT | Performed by: INTERNAL MEDICINE

## 2022-06-02 RX ORDER — CLONIDINE HYDROCHLORIDE 0.1 MG/1
0.1 TABLET ORAL 2 TIMES DAILY
Qty: 60 TABLET | Refills: 3 | Status: SHIPPED | OUTPATIENT
Start: 2022-06-02

## 2022-06-02 RX ORDER — CLONIDINE HYDROCHLORIDE 0.1 MG/1
0.1 TABLET ORAL
COMMUNITY
End: 2022-06-02 | Stop reason: SDUPTHER

## 2022-06-02 RX ORDER — SPIRONOLACTONE 25 MG/1
25 TABLET ORAL 2 TIMES DAILY
Qty: 60 TABLET | Refills: 5 | OUTPATIENT
Start: 2022-06-02

## 2022-06-02 RX ORDER — TRAZODONE HYDROCHLORIDE 100 MG/1
100 TABLET ORAL NIGHTLY
Qty: 30 TABLET | Refills: 3 | Status: SHIPPED | OUTPATIENT
Start: 2022-06-02

## 2022-06-02 ASSESSMENT — FIBROSIS 4 INDEX: FIB4 SCORE: 0.66

## 2022-06-03 PROBLEM — A59.9 TRICHOMONIASIS: Status: RESOLVED | Noted: 2021-04-02 | Resolved: 2022-06-03

## 2022-06-03 PROBLEM — Z76.89 ENCOUNTER TO ESTABLISH CARE: Status: RESOLVED | Noted: 2021-03-24 | Resolved: 2022-06-03

## 2022-06-03 PROBLEM — R06.09 DYSPNEA ON EFFORT: Status: RESOLVED | Noted: 2020-10-13 | Resolved: 2022-06-03

## 2022-06-03 PROBLEM — L08.9: Status: RESOLVED | Noted: 2018-05-15 | Resolved: 2022-06-03

## 2022-06-04 NOTE — ASSESSMENT & PLAN NOTE
She presents for follow-up diabetes.  She reports that since increasing her metformin to 1000 mg twice daily her blood sugars have been under better control.  She is doing her best to follow a low-carb diabetic diet.  Recent A1c was 6.0.

## 2022-06-04 NOTE — ASSESSMENT & PLAN NOTE
She is requesting to restart her trazodone which worked well for her in the past.  She is reporting significant insomnia especially trouble falling asleep.

## 2022-06-04 NOTE — ASSESSMENT & PLAN NOTE
She has been taking spironolactone 25 mg twice daily for over a year and she has not noticed much improvement in her hirsutism.  She would like to stop this medication as she notices it upsets her stomach.

## 2022-06-04 NOTE — PROGRESS NOTES
Subjective:   Cira Ferguson is a 41 y.o. female here today for f/u diabetes, knee pain, med refills    Type 2 diabetes mellitus with neurologic complication, without long-term current use of insulin (HCC)  She presents for follow-up diabetes.  She reports that since increasing her metformin to 1000 mg twice daily her blood sugars have been under better control.  She is doing her best to follow a low-carb diabetic diet.  Recent A1c was 6.0.    Anxiety  She reports taking clonidine 0.1 mg twice daily as needed for anxiety and states that it works well for her.  She is requesting refill today.    Hirsutism  She has been taking spironolactone 25 mg twice daily for over a year and she has not noticed much improvement in her hirsutism.  She would like to stop this medication as she notices it upsets her stomach.    Insomnia  She is requesting to restart her trazodone which worked well for her in the past.  She is reporting significant insomnia especially trouble falling asleep.    Patellar dislocation, subsequent encounter  She states that she was recently in [place] and she was getting out of her truck when she stepped into a loose pile of sticks and twisted her lower part of her leg.  She felt her kneecap move laterally and she had excruciating pain.  She went to the emergency room and states that they did an x-ray but no further treatments.  Later she got home and felt her kneecap popped back into place and had significant pain relief but now she is  as she is noticing that when she puts weight on her injured leg, it feels a little bit unstable.  Overall she feels like things are improving.  She is wondering if a brace would be appropriate.       Current medicines (including changes today)  Current Outpatient Medications   Medication Sig Dispense Refill   • traZODone (DESYREL) 100 MG Tab Take 1 Tablet by mouth every evening. 30 Tablet 3   • cloNIDine (CATAPRES) 0.1 MG Tab Take 1 Tablet by mouth 2 times  a day. 60 Tablet 3   • amLODIPine (NORVASC) 10 MG Tab Take 1 Tablet by mouth every day. 30 Tablet 5   • gabapentin (NEURONTIN) 300 MG Cap Take 300 mg by mouth.     • metformin (GLUCOPHAGE) 1000 MG tablet Take 1 Tablet by mouth 2 times a day with meals. 60 Tablet 3   • Blood Glucose Monitoring Suppl (BLOOD GLUCOSE MONITOR SYSTEM) w/Device Kit Use to test blood sugar as directed. 1 Kit 0   • ibuprofen (MOTRIN) 800 MG Tab Take 1 tablet by mouth every 8 hours as needed. Take with food 90 Tablet 5   • cyclobenzaprine (FLEXERIL) 10 mg Tab Take 1 Tablet by mouth 3 times a day as needed for Moderate Pain. 90 Tablet 5   • Lancets Use daily and as needed for symptoms of high or low sugar. 100 Each 11   • glucose blood strip Use daily and as needed for signs/symptoms for high or low sugar 100 Strip 11   • Alcohol Swabs (ALCOHOL PADS) 70 % Pads Clean skin prior to checking blood sugar daily and as needed for symptoms of high or low sugar 100 Each 11   • gabapentin (NEURONTIN) 400 MG Cap Take 800 mg by mouth.     • cyclobenzaprine (FLEXERIL) 10 mg Tab 1 tablet     • acetaminophen (TYLENOL) 325 MG Tab Take 650 mg by mouth every four hours as needed for Mild Pain. (Patient not taking: Reported on 6/2/2022)       No current facility-administered medications for this visit.     She  has a past medical history of Anxiety (6/2/2022), Asthma, Bipolar disorder ([facility]), Boil (2/2/2018), Cervical cancer ([facility]) (2015), Diabetes ([facility]), DVT (deep venous thrombosis) ([facility]) (2013), Fall, GERD (gastroesophageal reflux disease), Hepatitis C, Hypertension, Hypoxia (10/13/2020), Infectious disease, IV drug abuse ([facility]), Leukocytosis (1/25/2020),        Objective:     Vitals:    06/02/22 1654   BP: 124/78   Pulse: 88   Resp: 16   Temp: 36.2 °C (97.1 °F)   SpO2: 96%     Body mass index is 50.05 kg/m².   Physical Exam:  Constitutional: Alert, no distress.  Skin: Warm, dry, good turgor, no rashes in visible areas.  Eye: Equal, round and reactive,  conjunctiva clear, lids normal.  Psych: Alert and oriented x3, normal affect and mood.  MSK: No significant tenderness to palpation over peripatellar region, kneecap is located correctly however kneecaps are little lateral to begin with    Results and Imaging:   Lab Results   Component Value Date/Time    HBA1C 6.0 (H) 06/02/2022 11:46 AM          Assessment and Plan:   The following treatment plan was discussed    1. Patellar dislocation, subsequent encounter  She is improving and we discussed possibly wearing a soft knee brace with activity or using an Ace wrap until she is feeling a little more stable.  If symptoms do not fully resolve over the next weeks to months we could consider some physical therapy although this will be difficult for the patient given her job.    2. Insomnia, unspecified type  Previously well controlled with trazodone which I have refilled for her today.  - traZODone (DESYREL) 100 MG Tab; Take 1 Tablet by mouth every evening.  Dispense: 30 Tablet; Refill: 3    3. Anxiety  Previously well controlled with clonidine which I have refilled for her today  - cloNIDine (CATAPRES) 0.1 MG Tab; Take 1 Tablet by mouth 2 times a day.  Dispense: 60 Tablet; Refill: 3    4. Type 2 diabetes mellitus with diabetic neuropathy, without long-term current use of insulin (HCC)  Stable, controlled with current medication  -Continue metformin 1000 mg twice daily    5. Hirsutism  Discussed that she may stop the spironolactone since she has not noticed benefit        Followup: Return if symptoms worsen or fail to improve.

## 2022-06-04 NOTE — ASSESSMENT & PLAN NOTE
She reports taking clonidine 0.1 mg twice daily as needed for anxiety and states that it works well for her.  She is requesting refill today.

## 2022-06-04 NOTE — ASSESSMENT & PLAN NOTE
She states that she was recently in [place] and she was getting out of her truck when she stepped into a loose pile of sticks and twisted her lower part of her leg.  She felt her kneecap move laterally and she had excruciating pain.  She went to the emergency room and states that they did an x-ray but no further treatments.  Later she got home and felt her kneecap popped back into place and had significant pain relief but now she is  as she is noticing that when she puts weight on her injured leg, it feels a little bit unstable.  Overall she feels like things are improving.  She is wondering if a brace would be appropriate.

## 2022-07-01 PROCEDURE — RXMED WILLOW AMBULATORY MEDICATION CHARGE: Performed by: NURSE PRACTITIONER

## 2022-07-01 PROCEDURE — RXMED WILLOW AMBULATORY MEDICATION CHARGE: Performed by: INTERNAL MEDICINE

## 2022-07-05 ENCOUNTER — PATIENT MESSAGE (OUTPATIENT)
Dept: MEDICAL GROUP | Facility: MEDICAL CENTER | Age: 42
End: 2022-07-05
Payer: MEDICAID

## 2022-07-05 DIAGNOSIS — I10 ESSENTIAL HYPERTENSION: ICD-10-CM

## 2022-07-05 PROCEDURE — RXMED WILLOW AMBULATORY MEDICATION CHARGE: Performed by: PSYCHIATRY & NEUROLOGY

## 2022-07-05 RX ORDER — AMLODIPINE BESYLATE 2.5 MG/1
2.5 TABLET ORAL
Qty: 30 TABLET | Refills: 5 | OUTPATIENT
Start: 2022-07-05

## 2022-07-06 ENCOUNTER — PHARMACY VISIT (OUTPATIENT)
Dept: PHARMACY | Facility: MEDICAL CENTER | Age: 42
End: 2022-07-06
Payer: COMMERCIAL

## 2022-07-06 RX ORDER — AMLODIPINE BESYLATE 10 MG/1
10 TABLET ORAL DAILY
Qty: 30 TABLET | Refills: 5 | Status: SHIPPED | OUTPATIENT
Start: 2022-07-06

## 2022-11-03 ENCOUNTER — PATIENT MESSAGE (OUTPATIENT)
Dept: HEALTH INFORMATION MANAGEMENT | Facility: OTHER | Age: 42
End: 2022-11-03

## 2023-04-17 NOTE — DISCHARGE INSTRUCTIONS
You were seen and evaluated in the Emergency Department at [facility] for:     Draining wound from buttock and redness and wound to right arm.    You had the following tests and studies:    Thankfully your blood work and scans today are very reassuring we do not see any fluid collection that needs anything like surgery and her blood work is all normal.    You received the following medications:    Pain medicine, antibiotics.    You received the following prescriptions:    Doxycycline and amoxicillin, take as prescribed.  ----------------------------    Please make sure to follow up with:    Primary care provider for recheck of routine care, contact them tomorrow, if you have any worsening pain or fevers or vomiting or any other concerning symptoms come back to the hospital right away.    Good luck, we hope you get better soon!  ----------------------------    We always encourage patients to return IMMEDIATELY if they have:  Increased or changing pain, passing out, fevers over 100.4 (taken in your mouth or rectally) for more than 2 days, redness or swelling of skin or tissues, feeling like your heart is beating fast, chest pain that is new or worsening, trouble breathing, feeling like your throat is closing up and can not breath, inability to walk, weakness of any part of your body, new dizziness, severe bleeding that won't stop from any part of your body, if you can't eat or drink, or if you have any other concerns.   If you feel worse, please know that you can always return with any questions, concerns, worse symptoms, or you are feeling unsafe. We certainly cannot say for sure that we have ruled out every illness or dangerous disease, but we feel that at this specific time, your exam, tests, and vital signs like heart rate and blood pressure are safe for discharge.      CHIEF COMPLAINT: CHF  HPI:  68 y/o F with PMH COPD (on 3L O2 PRN), Type 2 DM, pulmonary HTN, LBBB, HTN, HLD presented with low SpO2 at home. Pt states that she did not feel that her lower extremity swelling improved since being home. She was in bed all day florentin and did not wake up until later in the day. She took her SpO2 which was 53% on room air. Reports lower extremity swelling, abdominal swelling, facial swelling, increased thirst. Does not feel that she snores or is fatigued during the day time. Has not had a bowel movement in a few days. Denies fevers, chills, chest pain, N/V, diarrhea.    Prior admission:   - 3/14/23: [facility] -> hypoxia to 60's -> Bipap -> Acute CHF and COPD exacerbation      ER course: SpO2 53% -> % on Bipap -> 93% on 3L O2. Labs: neutrophils 82%, K+ 5.5, glucose 148, AST 67, BNP 8970. VBG: pH 7.26, CO2 81, HCO3 36 -> ABG: pH 7.35, CO2 61, HCO3 24. COVID, influenza A/B, RSV negative. EKG: NSR with HR 86 bpm, PVCs,  ms (personally reviewed). CTA: No pulmonary embolus. Small right and trace left pleural effusions with adjacent compressive atelectasis. Small volume upper abdominal ascites. Cholithiasis.     Pt was given 250 ml of NS, lasix 40 mg IVP, Ipatropium nebulizer, magnesium sulfate, 125 mg IVP Solumedrol. She is being placed on telemetry observation for further management.  (10 Apr 2023 02:51)    Cardiology consulted to evaluate for HFpEF, severe pHTN plan for RHC. Pt; was admitted 3/9-3/13/23 for HFpEF exacerb, now p/w with decreased O2 Sat at home likely due to HFpEF, COPD and severe pHTN,   at present on Venti Mask. Pt's outpatient cardiologist is Bhupendra Joiner in [place].     4/11/23: Pt sitting up in chair.  +SOB but mildly improved.  +Bilat edema L>R  Tele: sinus rhythm PVC's, APC's, vent couplets  4/12/23: feels better, Tele: NSR 70s  4/13/23: less SOB; Tele: NSR 80 no events - can dc tele, CardioMEMS today   4/14/23: on venti mask, dyspnea not much changed, Tele: NSR 70s, s/p RHC yesterday, no CardioMEMS inserted   4/15/23: On nasal cannula oxygen sat 94% OOB to chair, pt feels her symptoms are improving.Pt is motivated to improve health in subacute if necessary. Off lasix gtts 2/2 hypotension. Tele overnight 3-4 second of AIVR / current SR 60-80 with occasional PVC   4/16/23: Seated in bedside chair awake alert appears comfortable tele SR SR/AIVR  4/17/23: feels better, breathing improved, changed to NC 3.5 L (was on Venti Mask), no cardiac complaints; Tele. NSR PVCs - can dc tele     MEDICATIONS  (STANDING):  albuterol    90 MICROgram(s) HFA Inhaler 2 Puff(s) Inhalation every 4 hours  aspirin enteric coated 81 milliGRAM(s) Oral daily  atorvastatin 20 milliGRAM(s) Oral at bedtime  budesonide 160 MICROgram(s)/formoterol 4.5 MICROgram(s) Inhaler 2 Puff(s) Inhalation two times a day  cholecalciferol 1000 Unit(s) Oral daily  cyanocobalamin 1000 MICROGram(s) Oral daily  dextrose 5%. 1000 milliLiter(s) (50 mL/Hr) IV Continuous <Continuous>  dextrose 5%. 1000 milliLiter(s) (100 mL/Hr) IV Continuous <Continuous>  dextrose 50% Injectable 25 Gram(s) IV Push once  dextrose 50% Injectable 12.5 Gram(s) IV Push once  dextrose 50% Injectable 25 Gram(s) IV Push once  furosemide    Tablet 40 milliGRAM(s) Oral every 8 hours  glucagon  Injectable 1 milliGRAM(s) IntraMuscular once  heparin   Injectable 5000 Unit(s) SubCutaneous every 12 hours  insulin glargine Injectable (LANTUS) 23 Unit(s) SubCutaneous at bedtime  insulin lispro (ADMELOG) corrective regimen sliding scale   SubCutaneous three times a day before meals  insulin lispro (ADMELOG) corrective regimen sliding scale   SubCutaneous at bedtime  insulin lispro Injectable (ADMELOG) 7 Unit(s) SubCutaneous three times a day before meals  losartan 100 milliGRAM(s) Oral daily  magnesium oxide 400 milliGRAM(s) Oral daily  metoprolol succinate ER 50 milliGRAM(s) Oral daily  nystatin Powder 1 Application(s) Topical two times a day  polyethylene glycol 3350 17 Gram(s) Oral daily  predniSONE   Tablet 60 milliGRAM(s) Oral two times a day  senna 2 Tablet(s) Oral at bedtime  tiotropium 2.5 MICROgram(s) Inhaler 2 Puff(s) Inhalation daily        MEDICATIONS  (PRN):  acetaminophen     Tablet .. 650 milliGRAM(s) Oral every 6 hours PRN Temp greater or equal to 38C (100.4F), Mild Pain (1 - 3)  aluminum hydroxide/magnesium hydroxide/simethicone Suspension 30 milliLiter(s) Oral every 4 hours PRN Dyspepsia  bisacodyl 5 milliGRAM(s) Oral every 12 hours PRN Constipation  dextrose Oral Gel 15 Gram(s) Oral once PRN Blood Glucose LESS THAN 70 milliGRAM(s)/deciliter  melatonin 3 milliGRAM(s) Oral at bedtime PRN Insomnia  ondansetron Injectable 4 milliGRAM(s) IV Push every 8 hours PRN Nausea and/or Vomiting    Vital Signs Last 24 Hrs  T(C): 36.7 (17 Apr 2023 08:43), Max: 36.7 (17 Apr 2023 06:25)  T(F): 98.1 (17 Apr 2023 08:43), Max: 98.1 (17 Apr 2023 06:25)  HR: 66 (17 Apr 2023 08:43) (66 - 79)  BP: 134/73 (17 Apr 2023 08:43) (119/60 - 134/73)  BP(mean): 78 (17 Apr 2023 06:25) (74 - 78)  RR: 19 (17 Apr 2023 08:43) (19 - 19)  SpO2: 95% (17 Apr 2023 08:43) (92% - 95%)    Parameters below as of 17 Apr 2023 08:43  Patient On (Oxygen Delivery Method): nasal cannula  O2 Flow (L/min): 3      PHYSICAL EXAM:    General: Awake and alert, No acute distress.   Skin: Warm, dry, and pink.   Eyes: Pupils equal and reactive to light. No conjunctival injection, discharge, or scleral icterus.   HEENT: Atraumatic, normocephalic.    Cardiology: Normal S1, S2. RRR  Respiratory: Decreased breath sounds with exp wheeze noted    Gastrointestinal: Abd soft non-tender   Extremities: 2+ peripheral edema bilaterally  Neurological: A+Ox3     LABS:    04-17    138  |  93<L>  |  61<H>  ----------------------------<  272<H>  4.0   |  42<H>  |  0.86    Ca    9.3      17 Apr 2023 09:01  Phos  3.6     04-16  Mg     1.9     04-16      - TroponinI hsT:                         11.8   7.84  )-----------( 213      ( 15 Apr 2023 07:41 )             43.2   04-15    140  |  99  |  79<H>  ----------------------------<  82  4.2   |  41<H>  |  1.02    Ca    9.6      15 Apr 2023 07:41  Mg     2.1     04-15                          11.9   6.57  )-----------( 213      ( 14 Apr 2023 08:35 )             43.1     04-14    141  |  103  |  88<H>  ----------------------------<  222<H>  4.8   |  38<H>  |  1.11    Ca    9.5      14 Apr 2023 08:35      - TroponinI hsT: <-36.31, <-34.35                          12.2   6.97  )-----------( 265      ( 12 Apr 2023 07:47 )             44.7     04-13    137  |  102  |  102<H>  ----------------------------<  228<H>  5.0   |  34<H>  |  1.45<H>    Ca    9.4      13 Apr 2023 08:19  Mg     2.7     04-12    TPro  6.7  /  Alb  3.2<L>  /  TBili  0.4  /  DBili  x   /  AST  25  /  ALT  34  /  AlkPhos  77  04-12    - TroponinI hsT: <-36.31, <-34.35                        12.4   6.84  )-----------( 243      ( 11 Apr 2023 08:07 )             43.8                           12.2   4.07  )-----------( 299      ( 10 Apr 2023 08:24 )             42.6                         13.0   4.76  )-----------( 332      ( 09 Apr 2023 17:42 )             46.2     04-11    137  |  102  |  76<H>  ----------------------------<  197<H>  5.4<H>   |  34<H>  |  1.54<H>    Ca    9.7      11 Apr 2023 08:07  Mg     2.8     04-11    TPro  7.1  /  Alb  3.3  /  TBili  0.4  /  DBili  x   /  AST  37  /  ALT  35  /  AlkPhos  88  04-11      10 Apr 2023 08:24    136    |  102    |  65     ----------------------------<  178    5.1     |  30     |  1.21   10 Apr 2023 04:00    x      |  x      |  x      ----------------------------<  180    x       |  x      |  x      09 Apr 2023 17:42    139    |  102    |  59     ----------------------------<  148    5.5     |  34     |  1.18     Ca    9.2        10 Apr 2023 08:24  Ca    9.4        09 Apr 2023 17:42  Mg     2.7       10 Apr 2023 08:24  Mg     2.5       09 Apr 2023 17:42    TPro  7.1    /  Alb  3.4    /  TBili  0.6    /  DBili  x      /  AST  51     /  ALT  40     /  AlkPhos  100    10 Apr 2023 08:24  TPro  7.6    /  Alb  3.7    /  TBili  0.9    /  DBili  x      /  AST  67     /  ALT  40     /  AlkPhos  115    09 Apr 2023 17:42    Radiology/Echocardiogram    < from: Xray Abdomen 2 Views (04.10.23 @ 03:43) >  IMPRESSION:  1. Technically limited study due to both motion and patient's body   habitus.  2. Atelectasis in the right middle and lower lobe of the right lung is   present.  3. Nonspecific mildly dilated centrally located small bowel loops and   partial aeration of the proximal left colon.  4. Chronic degenerative changes of the lumbar spine and right greater   than left hips. 5. AVN of the right hip cannot be excluded from the study.    < end of copied text >      -------------------------------------------------------------------------------------------  < from: TTE Echo Complete w/ Contrast w/ Doppler (03.10.23 @ 10:02) >     Impression     Summary     Endocardium is not well visualized, however, overall left ventricular   systolic function appears normal. Technically Difficult Study.   Septal flattening is seen; this finding is consistent with right heart   pressure / volume overload.   Estimated left ventricular ejection fraction is 55-60 %.   Normal appearing left atrium.   The right atrium appears moderately dilated.   The right ventricle is severely dilated.   The right ventricular apex appears hypokinetic.   The aortic valve is trileaflet with thin pliable leaflets.   Moderate mitral annular calcification is present.   There is calcification of mitral valve leaflets. The leaflet opening is   normal.   EA reversal of the mitral inflow consistent with reduced compliance of   the   left ventricle.   Trace mitral regurgitation is present.   The tricuspid valve leaflets are thin and pliable; valve motion is   normal.   Moderate (2+) tricuspid valve regurgitation is present.   Severe pulmonary hypertension.   Pulmonic valve not well seen.   No evidence of pericardial effusion.   No evidence of pleural effusion.   IVC is dilated and not collapsing with inspiration.     Signature     ----------------------------------------------------------------   Electronically signed by Lyssa Horta MD(Interpreting   physician) on 03/10/2023 10:44 AM   ----------------------------------------------------------------    < end of copied text >           mother

## 2024-04-28 NOTE — ED NOTES
COVID/SARS test performed by RN.  Pt tolerated well.  Specimen sent to lab.    mild leukocytosis (?reactive to fall); afebrile and hds otherwise - completed 3 days of ctx  prior uc have grown pan sensitive e.coli + p.mirabilis   ua w bacteria, no nitrites + pyuria w le  s/p vanc and zosyn in er  follow up uc; freq bladder scan as needed  monitor for fever, changes in white count  antipyretics, analgesics, antiemetics as needed

## (undated) DEVICE — ELECTRODE DUAL RETURN W/ CORD - (50/PK)

## (undated) DEVICE — DRAPE LAPAROTOMY T SHEET - (12EA/CA)

## (undated) DEVICE — SPONGE GAUZESTER 4 X 4 4PLY - (128PK/CA)

## (undated) DEVICE — SYRINGE SAFETY 10 ML 18 GA X 1 1/2 BLUNT LL (100/BX 4BX/CA)

## (undated) DEVICE — MIDAS LUBRICATOR DIFFUSER PACK (4EA/CA)

## (undated) DEVICE — CLEANER ELECTRO-SURGICAL TIP - (25/BX 4BX/CA)

## (undated) DEVICE — SET LEADWIRE 5 LEAD BEDSIDE DISPOSABLE ECG (1SET OF 5/EA)

## (undated) DEVICE — TUBING CLEARLINK DUO-VENT - C-FLO (48EA/CA)

## (undated) DEVICE — SUTURE 2-0 VICRYL PLUS CT-1 - 8 X 18 INCH(12/BX)

## (undated) DEVICE — MASK ANESTHESIA ADULT  - (100/CA)

## (undated) DEVICE — SUTURE 3-0 VICRYL PLUS RB-1 - 8 X 18 INCH (12/BX)

## (undated) DEVICE — SUTURE GENERAL

## (undated) DEVICE — DRAPE STRLE REG TOWEL 18X24 - (10/BX 4BX/CA)"

## (undated) DEVICE — HEADREST PRONEVIEW LARGE - (10/CA)

## (undated) DEVICE — NEPTUNE 4 PORT MANIFOLD - (20/PK)

## (undated) DEVICE — LACTATED RINGERS INJ. 500 ML - (24EA/CA)

## (undated) DEVICE — GLOVE BIOGEL INDICATOR SZ 7SURGICAL PF LTX - (50/BX 4BX/CA)

## (undated) DEVICE — SODIUM CHL IRRIGATION 0.9% 1000ML (12EA/CA)

## (undated) DEVICE — LACTATED RINGERS INJ 1000 ML - (14EA/CA 60CA/PF)

## (undated) DEVICE — SET EXTENSION WITH 2 PORTS (48EA/CA) ***PART #2C8610 IS A SUBSTITUTE*****

## (undated) DEVICE — SLEEVE, VASO, THIGH, MED

## (undated) DEVICE — CLOSURE WOUND 1/4 X 4 (STERI - STRIP) (50/BX 4BX/CA)

## (undated) DEVICE — PROTECTOR ULNA NERVE - (36PR/CA)

## (undated) DEVICE — PACK NEURO - (2EA/CA)

## (undated) DEVICE — SUCTION INSTRUMENT YANKAUER BULBOUS TIP W/O VENT (50EA/CA)

## (undated) DEVICE — BLADE SURGICAL CLIPPER - (50EA/CA)

## (undated) DEVICE — TUBING C&T SET FLYING LEADS DRAIN TUBING (10EA/BX)

## (undated) DEVICE — TOWELS CLOTH SURGICAL - (4/PK 20PK/CA)

## (undated) DEVICE — GLOVE BIOGEL PI ORTHO SZ 7.5 PF LF (40PR/BX)

## (undated) DEVICE — CANISTER SUCTION 3000ML MECHANICAL FILTER AUTO SHUTOFF MEDI-VAC NONSTERILE LF DISP  (40EA/CA)

## (undated) DEVICE — KIT ANESTHESIA W/CIRCUIT & 3/LT BAG W/FILTER (20EA/CA)

## (undated) DEVICE — GLOVE BIOGEL SZ 6.5 SURGICAL PF LTX (50PR/BX 4BX/CA)

## (undated) DEVICE — TUBE E-T HI-LO CUFF 7.5MM (10EA/PK)

## (undated) DEVICE — STERI STRIP COMPOUND BENZOIN - TINCTURE 0.6ML WITH APPLICATOR (40EA/BX)

## (undated) DEVICE — ELECTRODE 850 FOAM ADHESIVE - HYDROGEL RADIOTRNSPRNT (50/PK)

## (undated) DEVICE — GOWN WARMING STANDARD FLEX - (30/CA)

## (undated) DEVICE — TOOL DISSECT MATCH HEAD

## (undated) DEVICE — KIT ROOM DECONTAMINATION

## (undated) DEVICE — BOVIE BLADE SHORT - (150EA/CT)

## (undated) DEVICE — CHLORAPREP 26 ML APPLICATOR - ORANGE TINT(25/CA)

## (undated) DEVICE — KIT SURGIFLO W/OUT THROMBIN - (6EA/CA)

## (undated) DEVICE — SENSOR SPO2 NEO LNCS ADHESIVE (20/BX) SEE USER NOTES